# Patient Record
Sex: FEMALE | Race: WHITE | NOT HISPANIC OR LATINO | Employment: UNEMPLOYED | URBAN - METROPOLITAN AREA
[De-identification: names, ages, dates, MRNs, and addresses within clinical notes are randomized per-mention and may not be internally consistent; named-entity substitution may affect disease eponyms.]

---

## 2018-08-10 DIAGNOSIS — R92.8 ABNORMAL MAMMOGRAM: Primary | ICD-10-CM

## 2018-08-31 PROBLEM — D24.9 PAPILLOMA OF BREAST: Status: ACTIVE | Noted: 2018-08-31

## 2018-10-05 PROCEDURE — 88360 TUMOR IMMUNOHISTOCHEM/MANUAL: CPT | Performed by: PATHOLOGY

## 2018-10-05 PROCEDURE — 88360 TUMOR IMMUNOHISTOCHEM/MANUAL: CPT | Mod: 59 | Performed by: PATHOLOGY

## 2018-10-05 PROCEDURE — 88323 CONSLTJ&REPRT MATRL PREP SLD: CPT | Performed by: PATHOLOGY

## 2018-10-08 PROBLEM — D05.10 DCIS (DUCTAL CARCINOMA IN SITU): Status: ACTIVE | Noted: 2018-10-08

## 2018-10-12 ENCOUNTER — TELEPHONE (OUTPATIENT)
Dept: SURGERY | Facility: CLINIC | Age: 48
End: 2018-10-12

## 2018-10-12 NOTE — TELEPHONE ENCOUNTER
Spoke with pt. Offered appt with Dr Matias at [facility] for f/u. Pt states she has appt for f/u already on 10/19/18 at [facility] and would like to keep that appt as it would be sooner than appt on [facility]. Pt states going to [facility] is not out of her way for appts., just wanted to see about having the surgery closer to home. Pt will keep appt 10/19/18 at [facility] at 9:15 am.

## 2018-10-12 NOTE — TELEPHONE ENCOUNTER
----- Message from Prabha Matias MD sent at 10/11/2018  1:46 PM CDT -----  Regarding: RE: pt wanting to possible schedule sx @    Yes, she lives in [place], so I can probably even see her at my [facility] clinic.  Gil, would you mind getting her scheduled for a clinic visit?    ----- Message -----  From: Tami Mardaiaga LPN  Sent: 10/9/2018   2:08 PM  To: Prabha Matias MD, Gil Sanchez, RN  Subject: pt wanting to possible schedule sx @           Hey,    Pt seen yesterday by MD Solorzano:    Assessment/Plan:       Lou Jc is a 48 y.o. 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        - Currently awaiting tumor marker evaluation by pathology.   - Discussed current treatment options with the patient in detail including re-excision of the lumpectomy bed with postoperative XRT versus completion mastectomy which would not likely require postoperative radiation. Patient thinks she would like to proceed with re-excision, but would like to think about these options further and wait for final path to result prior to making a final decision.   - Patient will return to clinic next Friday for treatment planning.      Lino Solorzano MD  General Surgery PGY-3  Pager: [phone]         Spoke with pt, thinking she would rather have a mastectomy instead of re-excision. Also pt is wondering if she can possible been seen @ [facility] traveling over an hour to arrive @ [facility]. Can her FU appt and sx be scheduled there? I am leaving her appt on for 10/19/2018 here @ [facility] until I hear back from you.     Thanks,  K

## 2018-10-19 ENCOUNTER — OFFICE VISIT (OUTPATIENT)
Dept: SURGERY | Facility: CLINIC | Age: 48
End: 2018-10-19
Payer: MEDICAID

## 2018-10-19 ENCOUNTER — DOCUMENTATION ONLY (OUTPATIENT)
Dept: SURGERY | Facility: CLINIC | Age: 48
End: 2018-10-19

## 2018-10-19 VITALS
HEIGHT: 59 IN | WEIGHT: 132 LBS | BODY MASS INDEX: 26.61 KG/M2 | HEART RATE: 80 BPM | SYSTOLIC BLOOD PRESSURE: 133 MMHG | TEMPERATURE: 98 F | DIASTOLIC BLOOD PRESSURE: 78 MMHG

## 2018-10-19 DIAGNOSIS — D05.12 DUCTAL CARCINOMA IN SITU (DCIS) OF LEFT BREAST: Primary | ICD-10-CM

## 2018-10-19 PROCEDURE — 99024 POSTOP FOLLOW-UP VISIT: CPT | Mod: ,,, | Performed by: SURGERY

## 2018-10-19 PROCEDURE — 99213 OFFICE O/P EST LOW 20 MIN: CPT | Mod: PBBFAC,PO | Performed by: SURGERY

## 2018-10-19 PROCEDURE — 99999 PR PBB SHADOW E&M-EST. PATIENT-LVL III: CPT | Mod: PBBFAC,,, | Performed by: SURGERY

## 2018-10-19 NOTE — PROGRESS NOTES
Breast Surgery  [facility]  Department of Surgery      REFERRING PROVIDER: No referring provider defined for this encounter.    Chief Complaint: Post-op Evaluation (Post-Op Discuss Re-exision or Mastectomy.)      Subjective:      Patient ID: Lou Jc is a 48 y.o. female who presents with newly diagnosed LEFT breast cancer. She was initially seen at OSH ([facility] in [place], [place]). She had a BI-RADS 0 bilateral screening mammogram for architectural distortion and subsequent bilateral diagnostic mammogram and left breast ultrasound that were BI-RADS 4. She subsequently underwent ultrasound-guided core needle biopsy on 8/17/18 with pathology showing fragmented papilloma with no evidence of atypia or in-situ or invasive carcinoma. She was then seen by General Surgery at OSH on 8/31/18 to discuss wire localization excisional biopsy of this intraductal papilloma, which ultimately occurred on 9/21/18. 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 She was seen back in clinic at OSH to discuss surgical options on 10/8/18.     Patient does routinely do self breast exams. Patient has not noted a change on breast exam. Patient denies nipple discharge. Patient denies previous breast biopsy (prior to current events). Patient denies a personal history of breast cancer (prior to current events).    Findings at that time were the following:   Tumor size: 2.0 cm (focally positive margin - medial)  Tumor rdgrdrrdarddrderd:rd rd3rd Estrogen Receptor: +   Progesterone Receptor: +   Her-2 ryan: N/A   Lymph node status: Clinically negative  Lymphatic invasion: N/A     Risk Factors/Breast History  Age of Menarche: 10  Age of Menopause: 48  History of Hormonal Therapy: OCPs x 4 years. No HRT.  Number of Pregnancies: Nulliparous  Age of First Birth: N/A  Lactational History: N/A  Mammogram History: Annual or every  2 years for > 10 years  History of Chest Radiation: No  History of Breast Bx: Yes (current events)  History of Breast Ca: Yes (current events)  Family History of Breast Ca: Maternal aunt (age 60s). Maternal aunt (age 70s). Paternal aunt (age 70s).  Family History of Ovarian Ca: No  Other Family History: Father with pancreatic cancer.    Current smoker. 1 PPD x 25 years.  No antiplatelet or anticoagulant agents.    Past Medical History:   Diagnosis Date    Allergy     Breast cyst     DCIS (ductal carcinoma in situ)     Left    Papilloma of breast     Reflux      Past Surgical History:   Procedure Laterality Date    BREAST BIOPSY Left     papilloma    BREAST CYST ASPIRATION Left     diagnostic lap      LUMPECTOMY,BREAST Left 9/21/2018    Performed by [facility]abha [facility]ias [facility] at [facility] OR    TONSILLECTOMY       Current Outpatient Medications on File Prior to Visit   Medication Sig Dispense Refill    ascorbic acid, vitamin C, (VITAMIN C) 100 MG tablet Take 100 mg by mouth once daily.      b complex vitamins tablet Take 1 tablet by mouth once daily.      clonazePAM (KLONOPIN) 1 MG tablet Take 1 tablet (1 mg total) by mouth every evening. 30 tablet 0    ketoconazole (NIZORAL) 2 % cream Apply topically once daily. 30 g 0    ketoconazole (NIZORAL) 200 mg Tab Take 200 mg by mouth once daily.      loratadine (CLARITIN) 10 mg tablet Take 1 tablet (10 mg total) by mouth once daily. 30 tablet 3    naproxen (NAPROSYN) 500 MG tablet Take 1 tablet (500 mg total) by mouth 2 (two) times daily with meals. 90 tablet 1    omeprazole (PRILOSEC) 20 MG capsule Take 20 mg by mouth once daily.      diclofenac sodium (VOLTAREN) 1 % Gel Apply 2 g topically 4 (four) times daily. 100 g 0    ergocalciferol (ERGOCALCIFEROL) 50,000 unit Cap Take 1 capsule (50,000 Units total) by mouth every 7 days. 12 capsule 3    [DISCONTINUED] HYDROcodone-acetaminophen (NORCO) 5-325 mg per tablet Take 1 tablet by mouth every 6 (six) hours as needed  for Pain. 15 tablet 0    [DISCONTINUED] mupirocin (BACTROBAN) 2 % ointment Apply topically 2 (two) times daily. AAA 20 g 1    [DISCONTINUED] oxyCODONE-acetaminophen (PERCOCET)  mg per tablet Take 1 tablet by mouth every 6 (six) hours as needed for Pain.       No current facility-administered medications on file prior to visit.      Social History     Socioeconomic History    Marital status:      Spouse name: Not on file    Number of children: Not on file    Years of education: unknown    Highest education level: Not on file   Social Needs    Financial resource strain: Not on file    Food insecurity - worry: Not on file    Food insecurity - inability: Not on file    Transportation needs - medical: Not on file    Transportation needs - non-medical: Not on file   Occupational History    Not on file   Tobacco Use    Smoking status: Former Smoker     Packs/day: 0.50     Years: 20.00     Pack years: 10.00     Types: Cigarettes     Last attempt to quit: 2017     Years since quittin.1    Smokeless tobacco: Never Used   Substance and Sexual Activity    Alcohol use: Yes     Alcohol/week: 1.2 oz     Types: 2 Cans of beer per week    Drug use: Yes     Types: Marijuana    Sexual activity: Yes     Partners: Male     Birth control/protection: None   Other Topics Concern    Are you pregnant or think you may be? Not Asked    Breast-feeding Not Asked   Social History Narrative    Not on file     Family History   Problem Relation Age of Onset    Diabetes Mother     Heart disease Mother     COPD Mother     Hypertension Mother     Kidney disease Mother     Cancer Mother     Throat cancer Mother     Depression Mother     Hearing loss Mother     Heart disease Father     Cancer Father     Diabetes Father     Bone cancer Father     Breast cancer Maternal Aunt     Breast cancer Paternal Aunt         Review of Systems   Constitutional: Negative for activity change, chills, fatigue and  "fever.   HENT: Negative for congestion, rhinorrhea and sore throat.    Eyes: Negative for visual disturbance.   Respiratory: Negative for cough, shortness of breath and wheezing.    Cardiovascular: Negative for chest pain, palpitations and leg swelling.   Gastrointestinal: Negative for abdominal distention, abdominal pain, constipation, diarrhea, nausea and vomiting.   Genitourinary: Negative for dysuria, frequency and hematuria.   Musculoskeletal: Negative for arthralgias and myalgias.   Skin: Negative for color change, rash and wound.   Neurological: Negative for syncope, weakness and numbness.   Hematological: Does not bruise/bleed easily.   Psychiatric/Behavioral: Negative for behavioral problems and confusion.        Objective:   /78 (BP Location: Right arm, Patient Position: Sitting, BP Method: Medium (Automatic))   Pulse 80   Temp 98.1 °F (36.7 °C) (Oral)   Ht 4' 11" (1.499 m)   Wt 59.9 kg (132 lb)   LMP 06/27/2016   BMI 26.66 kg/m²     Physical Exam   Vitals reviewed.  Constitutional: She is oriented to person, place, and time. She appears well-developed and well-nourished.   Tearful throughout visit today   HENT:   Head: Normocephalic and atraumatic.   Eyes: Conjunctivae and EOM are normal.   Cardiovascular: Normal rate, regular rhythm and intact distal pulses.    Pulmonary/Chest: Effort normal. No respiratory distress. She has no wheezes. Right breast exhibits no inverted nipple, no mass, no nipple discharge, no skin change and no tenderness. Left breast exhibits no inverted nipple, no mass, no nipple discharge, no skin change and no tenderness. Breasts are symmetrical. There is no breast swelling.       Abdominal: Soft. She exhibits no distension. There is no tenderness.   Genitourinary: No breast bleeding.   Musculoskeletal: Normal range of motion. She exhibits no edema or deformity.   Neurological: She is alert and oriented to person, place, and time.   Skin: Skin is warm and dry. No rash " noted. She is not diaphoretic. No erythema.     Psychiatric: She has a normal mood and affect. Her behavior is normal.       Radiology review: Images personally reviewed by me in the clinic.   Bilateral Diagnostic Mammogram (8/10/18): BI-RADS 4. The breasts are extremely dense, which lowers the sensitivity of mammography. There is a focal asymmetry seen in the outer central region of the left breast. May correspond to a complicated cyst noted by ultrasound assessment. There are coarse heterogeneous calcifications seen in the left breast. Compared to the previous study, there are no significant changes. There is an asymmetry seen in the central region of the left breast in the middle depth on the MLO view. Does not persist on spot compression view. There is architectural distortion seen in the central region of the right breast in the posterior depth on the MLO view. Does not persist upon additional imaging. There are multiple similar lymph nodes seen in both axillae. Compared to the previous study, there are no significant changes.    US Breast Left Limited (8/10/18): BI-RADS 4.There is a 9 x 8 x 3 mm oval complicated cyst with circumscribed margins seen in the left breast at 3 o'clock. Contains non-dependent debris or tissue. There are multiple similar oval simple cysts with circumscribed margins seen in the left breast at 3 o'clock.     Bilateral Screening Mammogram (7/18/18): BI-RADS 0. The breasts are extremely dense, which lowers the sensitivity of mammography. There is an asymmetry seen in the central region of the left breast in the posterior depth on the MLO view. This is a new finding. There is a focal asymmetry seen in the outer central region of the left breast in the middle depth. This is a new finding. There are coarse heterogeneous calcifications seen in the left breast. Compared to the previous study, there are no significant changes. There is architectural distortion seen in the central region of the  right breast in the posterior depth on the MLO view. This is a new finding. There are multiple similar lymph nodes seen in both axillae. Compared to the previous study, there are no significant changes.     Assessment:       1. Ductal carcinoma in situ (DCIS) of left breast        Plan:     Options for management were discussed with the patient and her family. We reviewed the existing data noting the equivalency of breast conserving surgery with radiation therapy and mastectomy. We also reviewed the guidelines of the National Comprehensive Cancer Network for Stage 0 breast carcinoma. We discussed the need for lumpectomy margins to be negative for carcinoma, the necessity for postoperative radiation therapy after breast conservation in most cases, the possibility of a failed or false negative sentinel lymph node biopsy and the potential need for complete lymphadenectomy for a failed or positive sentinel lymph node biopsy were fully discussed. In the setting of mastectomy, delayed or immediate reconstruction options are available and were discussed.     In the setting of lumpectomy, radiation therapy would be recommended majority of the time. The duration and treatment side effects were discussed with the patient. This will coordinated with the radiation oncologist pending final pathology.    We also discussed the role of systemic therapy in the treatment of early stage breast cancer. We discussed that this is based on tumor biology and long status and will be determined based on final pathology. We discussed that if the cancer is hormone positive, endocrine therapy would be recommended in most cases and its use can reduce the risk of recurrence as well as improve survival. Side effects of treatment were briefly discussed. We also discussed the potential role for chemotherapy based on a number of factors such as tumor phenotype (ER+ vs. triple negative vs. [id number]+) and this would be determined in coordination with  the medical oncologist.    The patient, in consultation with her family, has elected to consider her options further.   She is on the fence sbout re-excision lumpectomy vs mastectomy with reconstruction.  She is very nervous regardless. The operative risks of bleeding, infection, recurrence, scarring, and anesthetic complications and the possibility of requiring further surgery were all noted.  I offered her surgery to be at [facility] or [facility], or even [facility] if she prefers.  We will refer her for Genetics as well given her above history.    Surgery scheduled. Follow-up in clinic roughly 14 days after surgery.     Patient was educated on breast cancer, receptors, wire localization lumpectomy, mastectomy, sentinel lymph node mapping and biopsy, axillary lymph node dissection, reconstruction, breast prosthesis with post-mastectomy bra and radiation therapy. Patient was given patient information binder including [facility] breast cancer treatment brochure. All her questions were answered.    Total time spent with the patient: 45 minutes. 30 minutes of face to face consultation and 15 minutes of chart review and coordination of care.

## 2018-10-23 ENCOUNTER — TELEPHONE (OUTPATIENT)
Dept: SURGERY | Facility: CLINIC | Age: 48
End: 2018-10-23

## 2018-10-23 NOTE — TELEPHONE ENCOUNTER
Contacted the patient regarding genetic counseling appointment.  Stated to the patient that there was a cancellation for tomorrow Wednesday 10/24/18 and she can be seen then if she would like.  The patient stated she would like to take the appointment, and she is scheduled to be seen tomorrow Wednesday 10/24/18 at 3:30 pm at [facility].  The patient voiced understanding of the appointment date, time, and location.

## 2018-10-24 ENCOUNTER — LAB VISIT (OUTPATIENT)
Dept: LAB | Facility: HOSPITAL | Age: 48
End: 2018-10-24
Payer: MEDICAID

## 2018-10-24 ENCOUNTER — OFFICE VISIT (OUTPATIENT)
Dept: SURGERY | Facility: CLINIC | Age: 48
End: 2018-10-24
Payer: MEDICAID

## 2018-10-24 DIAGNOSIS — D05.12 DUCTAL CARCINOMA IN SITU (DCIS) OF LEFT BREAST: Primary | ICD-10-CM

## 2018-10-24 DIAGNOSIS — Z80.0 FAMILY HISTORY OF PANCREATIC CANCER: ICD-10-CM

## 2018-10-24 DIAGNOSIS — Z80.3 FAMILY HISTORY OF BREAST CANCER: ICD-10-CM

## 2018-10-24 DIAGNOSIS — D05.12 DUCTAL CARCINOMA IN SITU (DCIS) OF LEFT BREAST: ICD-10-CM

## 2018-10-24 DIAGNOSIS — Z71.83 ENCOUNTER FOR NONPROCREATIVE GENETIC COUNSELING: Primary | ICD-10-CM

## 2018-10-24 PROCEDURE — 99215 OFFICE O/P EST HI 40 MIN: CPT | Mod: S$PBB,,, | Performed by: NURSE PRACTITIONER

## 2018-10-24 PROCEDURE — 36415 COLL VENOUS BLD VENIPUNCTURE: CPT

## 2018-10-24 NOTE — PROGRESS NOTES
Mrs Jc presents for genetic counseling, referred by Dr Matias. She is a 48 year old female with recent diagnosis of DCIS. See pedigree for full family history which will be scanned into Epic media.  This patient does not have a known hereditary cancer genetic mutation on either side of the family and does not have known Ashkenazi Amish ancestry.      We reviewed her medical and family history and discussed the genetics of breast cancer, cancer risks associated with a hereditary predisposition to cancer, and the benefits, risks, and limitations of genetic testing according to current NCCN guidelines.  Discussed sporadic verses family clustering verses hereditary cancer. The patients history raises the possibility of a genetic susceptibility to breast cancer, with the two genes most strongly associated with early-onset breast cancer are BRCA1 and BRCA2. Mutations in these genes increase the risk for both breast and ovarian cancer in women and breast and early prostate cancer in men, along with an elevated risk of pancreatic cancer and melanoma. Due to significant clinical overlap in hereditary cancer susceptibility genes, a molecular diagnosis of a hereditary cancer condition is necessary to clarify the cancer risks for this patient and multigene testing was discussed.      Possible results of genetic testing: positive result would mean that a mutation known to be associated with a higher risk of cancer was identified; negative result would mean that no mutation known to increase the risk of cancer was identified; variant of uncertain significance (VUS) in which a genetic change was identified in a gene, but it is unclear if this change causes an increase in cancer risk normally associated with mutations in the gene. There is an estimated 1-2% chance of a reported variant of uncertain significance in BRCA1 and BRCA2 and up to a 30% with newer genes included in multigene panels. Due to the clinical  uncertainty of this type of change, VUSs are not used to make medical management decisions for a patient. Finally, I advised the patient that her medical management may change based on these results and may include increased surveillance, use of chemoprevention, or prophylactic surgery. A tailored cancer prevention plan and implications of the patients test results for relatives will be reviewed once results are available.      Women who carry mutations in the BRCA1 or BRCA2 gene have a 41-87% risk to develop breast cancer and up to a 54 % risk of developing ovarian cancer during their lifetime. Women who carry a BRCA1 or BRCA2 mutation also have a greater chance of developing contralateral breast cancer. Men who carry a BRCA2 gene mutation have up to a 6-7% risk to develop breast cancer and an increased risk for early-onset aggressive prostate cancer (2-6 fold increase). Mutations in the BRCA2 gene have also been associated with an increased risk other types of cancer in both men and women in some families, most notably pancreatic cancer and melanoma. Specific cancer risks vary depending on the tumor suppressor gene in which a mutation arises.      Discussed the cost of testing, various labs which can conduct genetic testing and potential insurance coverage. She desires to proceed with testing, expressed her understanding of the information discussed today and provided informed consent for testing.         RECOMMENDATIONS:                                                                1. Integrated [facility] with [facility] through [facility], will request for results to be expedited for surgical decision making.   2. Post test counseling will be provided once results are available with healthcare management per results, including testing of any additional family members if pathogenic mutation is identified.    Time in counseling today 45 min, total time 45 min (entire time spent in face to face  counseling)

## 2018-10-26 NOTE — PROGRESS NOTES
Nurse Navigator Note:     Met with patient during her consult with Dr. Matias. Patient and I reviewed the information she discussed with Dr. Matias, including treatment options, diagnosis, and future plans for workup. Patient and I went through the new patient booklet, explained some of the information and why it is provided.     Also offered patient consults with our other specialty clinics: Dr. Davis for gynecological health during treatment, [facility] for physical therapy evaluation, Dr. Muller for psychological support, and [facility] for nutritional counseling. Explained to patient that all of these support services are completely optional. Discussed that physical therapy is the only service that is recommended pre-op specifically, everything else can be requested at a later time. Patient was given a copy of Dr. Matias's card and my card. Encouraged her to call me if she has any questions or concerns or would like to schedule any additional appointments. Verbalized understanding of all information.

## 2018-10-30 ENCOUNTER — TELEPHONE (OUTPATIENT)
Dept: SURGERY | Facility: CLINIC | Age: 48
End: 2018-10-30

## 2018-10-30 NOTE — TELEPHONE ENCOUNTER
----- Message from Jean-Claude Mcgee sent at 10/30/2018  4:15 PM CDT -----    Communication Preference: [phone]    Additional Information:Pt states she need to speak with nurse in regards to Her2 and IDC.

## 2018-11-07 ENCOUNTER — DOCUMENTATION ONLY (OUTPATIENT)
Dept: SURGERY | Facility: CLINIC | Age: 48
End: 2018-11-07

## 2018-11-07 NOTE — PROGRESS NOTES
Results received from [email or web address], negative Integrated BRACAnalysis with Mahendra, patient was phoned and results discussed. She will f/u with Dr Matias regarding her remaining treatment/surgery

## 2018-11-08 ENCOUNTER — TELEPHONE (OUTPATIENT)
Dept: SURGERY | Facility: CLINIC | Age: 48
End: 2018-11-08

## 2018-11-08 LAB — INTEGRATED BRAC ANALYSIS: NORMAL

## 2018-11-08 NOTE — TELEPHONE ENCOUNTER
Call to pt to schedule f/u appt for final surgical planning. Pt would like to try lumpectomy and will come in for an appt to Dr Matias 11/12/18 at [facility].

## 2018-11-12 ENCOUNTER — OFFICE VISIT (OUTPATIENT)
Dept: SURGERY | Facility: CLINIC | Age: 48
End: 2018-11-12
Payer: MEDICAID

## 2018-11-12 ENCOUNTER — ANESTHESIA EVENT (OUTPATIENT)
Dept: SURGERY | Facility: HOSPITAL | Age: 48
End: 2018-11-12
Payer: MEDICAID

## 2018-11-12 DIAGNOSIS — D05.12 DUCTAL CARCINOMA IN SITU (DCIS) OF LEFT BREAST: Primary | ICD-10-CM

## 2018-11-12 PROCEDURE — 99212 PR OFFICE/OUTPT VISIT, EST, LEVL II, 10-19 MIN: ICD-10-PCS | Mod: S$PBB,,, | Performed by: SURGERY

## 2018-11-12 PROCEDURE — 99999 PR PBB SHADOW E&M-EST. PATIENT-LVL I: ICD-10-PCS | Mod: PBBFAC,,, | Performed by: SURGERY

## 2018-11-12 PROCEDURE — 99212 OFFICE O/P EST SF 10 MIN: CPT | Mod: S$PBB,,, | Performed by: SURGERY

## 2018-11-12 PROCEDURE — 99999 PR PBB SHADOW E&M-EST. PATIENT-LVL I: CPT | Mod: PBBFAC,,, | Performed by: SURGERY

## 2018-11-12 PROCEDURE — 99211 OFF/OP EST MAY X REQ PHY/QHP: CPT | Mod: PBBFAC,PO | Performed by: SURGERY

## 2018-11-15 ENCOUNTER — TELEPHONE (OUTPATIENT)
Dept: SURGERY | Facility: CLINIC | Age: 48
End: 2018-11-15

## 2018-11-15 NOTE — TELEPHONE ENCOUNTER
Call to pt to inform of surgery arrival time tomorrow 11/16/18. Call went to voice mail. Message left for pt to call back.

## 2018-11-16 ENCOUNTER — HOSPITAL ENCOUNTER (OUTPATIENT)
Facility: HOSPITAL | Age: 48
Discharge: HOME | End: 2018-11-16
Attending: SURGERY | Admitting: SURGERY
Payer: MEDICAID

## 2018-11-16 ENCOUNTER — ANESTHESIA (OUTPATIENT)
Dept: SURGERY | Facility: HOSPITAL | Age: 48
End: 2018-11-16
Payer: MEDICAID

## 2018-11-16 VITALS
WEIGHT: 134 LBS | RESPIRATION RATE: 20 BRPM | DIASTOLIC BLOOD PRESSURE: 84 MMHG | HEART RATE: 83 BPM | HEIGHT: 59 IN | TEMPERATURE: 99 F | OXYGEN SATURATION: 100 % | BODY MASS INDEX: 27.01 KG/M2 | SYSTOLIC BLOOD PRESSURE: 120 MMHG

## 2018-11-16 DIAGNOSIS — D05.10 DCIS (DUCTAL CARCINOMA IN SITU) OF BREAST: ICD-10-CM

## 2018-11-16 PROCEDURE — 63600175 PHARM REV CODE 636 W HCPCS

## 2018-11-16 PROCEDURE — 71000033 HC RECOVERY, INTIAL HOUR: Performed by: SURGERY

## 2018-11-16 PROCEDURE — 19301 PARTIAL MASTECTOMY: CPT | Mod: LT,,, | Performed by: SURGERY

## 2018-11-16 PROCEDURE — 71000015 HC POSTOP RECOV 1ST HR: Performed by: SURGERY

## 2018-11-16 PROCEDURE — 00400 ANES INTEGUMENTARY SYS NOS: CPT | Performed by: SURGERY

## 2018-11-16 PROCEDURE — 88360 TUMOR IMMUNOHISTOCHEM/MANUAL: CPT | Performed by: PATHOLOGY

## 2018-11-16 PROCEDURE — 63600175 PHARM REV CODE 636 W HCPCS: Performed by: ANESTHESIOLOGY

## 2018-11-16 PROCEDURE — 25000003 PHARM REV CODE 250: Performed by: STUDENT IN AN ORGANIZED HEALTH CARE EDUCATION/TRAINING PROGRAM

## 2018-11-16 PROCEDURE — 36000707: Performed by: SURGERY

## 2018-11-16 PROCEDURE — 63600175 PHARM REV CODE 636 W HCPCS: Performed by: NURSE ANESTHETIST, CERTIFIED REGISTERED

## 2018-11-16 PROCEDURE — 25000003 PHARM REV CODE 250: Performed by: SURGERY

## 2018-11-16 PROCEDURE — 94761 N-INVAS EAR/PLS OXIMETRY MLT: CPT

## 2018-11-16 PROCEDURE — 37000008 HC ANESTHESIA 1ST 15 MINUTES: Performed by: SURGERY

## 2018-11-16 PROCEDURE — 71000016 HC POSTOP RECOV ADDL HR: Performed by: SURGERY

## 2018-11-16 PROCEDURE — 25000003 PHARM REV CODE 250: Performed by: NURSE ANESTHETIST, CERTIFIED REGISTERED

## 2018-11-16 PROCEDURE — 64520 N BLOCK LUMBAR/THORACIC: CPT | Performed by: ANESTHESIOLOGY

## 2018-11-16 PROCEDURE — 88342 IMHCHEM/IMCYTCHM 1ST ANTB: CPT | Performed by: PATHOLOGY

## 2018-11-16 PROCEDURE — 37000009 HC ANESTHESIA EA ADD 15 MINS: Performed by: SURGERY

## 2018-11-16 PROCEDURE — 64461 PVB THORACIC SINGLE INJ SITE: CPT | Mod: 59,LT,, | Performed by: ANESTHESIOLOGY

## 2018-11-16 PROCEDURE — D9220A PRA ANESTHESIA: Mod: ANES,,, | Performed by: ANESTHESIOLOGY

## 2018-11-16 PROCEDURE — D9220A PRA ANESTHESIA: Mod: CRNA,,, | Performed by: NURSE ANESTHETIST, CERTIFIED REGISTERED

## 2018-11-16 PROCEDURE — 88307 TISSUE EXAM BY PATHOLOGIST: CPT | Performed by: PATHOLOGY

## 2018-11-16 PROCEDURE — 36000706: Performed by: SURGERY

## 2018-11-16 RX ORDER — DEXAMETHASONE SODIUM PHOSPHATE 4 MG/ML
INJECTION, SOLUTION INTRA-ARTICULAR; INTRALESIONAL; INTRAMUSCULAR; INTRAVENOUS; SOFT TISSUE
Status: DISCONTINUED | OUTPATIENT
Start: 2018-11-16 | End: 2018-11-16

## 2018-11-16 RX ORDER — ONDANSETRON 2 MG/ML
4 INJECTION INTRAMUSCULAR; INTRAVENOUS ONCE AS NEEDED
Status: DISCONTINUED | OUTPATIENT
Start: 2018-11-16 | End: 2018-11-16 | Stop reason: HOSPADM

## 2018-11-16 RX ORDER — ACETAMINOPHEN 10 MG/ML
INJECTION, SOLUTION INTRAVENOUS
Status: DISCONTINUED | OUTPATIENT
Start: 2018-11-16 | End: 2018-11-16

## 2018-11-16 RX ORDER — ONDANSETRON 2 MG/ML
INJECTION INTRAMUSCULAR; INTRAVENOUS
Status: DISCONTINUED | OUTPATIENT
Start: 2018-11-16 | End: 2018-11-16

## 2018-11-16 RX ORDER — FENTANYL CITRATE 50 UG/ML
25 INJECTION, SOLUTION INTRAMUSCULAR; INTRAVENOUS EVERY 5 MIN PRN
Status: DISCONTINUED | OUTPATIENT
Start: 2018-11-16 | End: 2018-11-16 | Stop reason: HOSPADM

## 2018-11-16 RX ORDER — HYDROCODONE BITARTRATE AND ACETAMINOPHEN 5; 325 MG/1; MG/1
1 TABLET ORAL EVERY 4 HOURS PRN
Status: DISCONTINUED | OUTPATIENT
Start: 2018-11-16 | End: 2018-11-16 | Stop reason: HOSPADM

## 2018-11-16 RX ORDER — LIDOCAINE HYDROCHLORIDE 10 MG/ML
INJECTION, SOLUTION EPIDURAL; INFILTRATION; INTRACAUDAL; PERINEURAL
Status: DISCONTINUED
Start: 2018-11-16 | End: 2018-11-16 | Stop reason: HOSPADM

## 2018-11-16 RX ORDER — FENTANYL CITRATE 50 UG/ML
25 INJECTION, SOLUTION INTRAMUSCULAR; INTRAVENOUS EVERY 5 MIN PRN
Status: COMPLETED | OUTPATIENT
Start: 2018-11-16 | End: 2018-11-16

## 2018-11-16 RX ORDER — SODIUM CHLORIDE 9 MG/ML
INJECTION, SOLUTION INTRAVENOUS CONTINUOUS
Status: DISCONTINUED | OUTPATIENT
Start: 2018-11-16 | End: 2018-11-16 | Stop reason: HOSPADM

## 2018-11-16 RX ORDER — FENTANYL CITRATE 50 UG/ML
INJECTION, SOLUTION INTRAMUSCULAR; INTRAVENOUS
Status: DISCONTINUED | OUTPATIENT
Start: 2018-11-16 | End: 2018-11-16

## 2018-11-16 RX ORDER — BUPIVACAINE HYDROCHLORIDE AND EPINEPHRINE 5; 5 MG/ML; UG/ML
INJECTION, SOLUTION EPIDURAL; INTRACAUDAL; PERINEURAL
Status: COMPLETED | OUTPATIENT
Start: 2018-11-16 | End: 2018-11-16

## 2018-11-16 RX ORDER — PROPOFOL 10 MG/ML
VIAL (ML) INTRAVENOUS
Status: DISCONTINUED | OUTPATIENT
Start: 2018-11-16 | End: 2018-11-16

## 2018-11-16 RX ORDER — SODIUM CHLORIDE 9 MG/ML
INJECTION, SOLUTION INTRAVENOUS CONTINUOUS PRN
Status: DISCONTINUED | OUTPATIENT
Start: 2018-11-16 | End: 2018-11-16

## 2018-11-16 RX ORDER — MIDAZOLAM HYDROCHLORIDE 1 MG/ML
INJECTION INTRAMUSCULAR; INTRAVENOUS
Status: COMPLETED
Start: 2018-11-16 | End: 2018-11-16

## 2018-11-16 RX ORDER — HYDROCODONE BITARTRATE AND ACETAMINOPHEN 5; 325 MG/1; MG/1
1 TABLET ORAL EVERY 4 HOURS PRN
Qty: 15 TABLET | Refills: 0 | Status: SHIPPED | OUTPATIENT
Start: 2018-11-16 | End: 2018-11-29 | Stop reason: SDUPTHER

## 2018-11-16 RX ORDER — SODIUM CHLORIDE 0.9 % (FLUSH) 0.9 %
3 SYRINGE (ML) INJECTION
Status: DISCONTINUED | OUTPATIENT
Start: 2018-11-16 | End: 2018-11-16 | Stop reason: HOSPADM

## 2018-11-16 RX ORDER — LIDOCAINE HCL/PF 100 MG/5ML
SYRINGE (ML) INTRAVENOUS
Status: DISCONTINUED | OUTPATIENT
Start: 2018-11-16 | End: 2018-11-16

## 2018-11-16 RX ORDER — FENTANYL CITRATE 50 UG/ML
INJECTION, SOLUTION INTRAMUSCULAR; INTRAVENOUS
Status: COMPLETED
Start: 2018-11-16 | End: 2018-11-16

## 2018-11-16 RX ORDER — MIDAZOLAM HYDROCHLORIDE 1 MG/ML
0.5 INJECTION INTRAMUSCULAR; INTRAVENOUS
Status: DISCONTINUED | OUTPATIENT
Start: 2018-11-16 | End: 2018-11-16 | Stop reason: HOSPADM

## 2018-11-16 RX ADMIN — FENTANYL CITRATE 100 MCG: 50 INJECTION INTRAMUSCULAR; INTRAVENOUS at 06:11

## 2018-11-16 RX ADMIN — BUPIVACAINE HYDROCHLORIDE AND EPINEPHRINE BITARTRATE 15 ML: 5; .0091 INJECTION, SOLUTION EPIDURAL; INTRACAUDAL; PERINEURAL at 06:11

## 2018-11-16 RX ADMIN — MIDAZOLAM HYDROCHLORIDE 2 MG: 1 INJECTION, SOLUTION INTRAMUSCULAR; INTRAVENOUS at 06:11

## 2018-11-16 RX ADMIN — FENTANYL CITRATE 25 MCG: 50 INJECTION INTRAMUSCULAR; INTRAVENOUS at 08:11

## 2018-11-16 RX ADMIN — ACETAMINOPHEN 1000 MG: 10 INJECTION, SOLUTION INTRAVENOUS at 07:11

## 2018-11-16 RX ADMIN — ONDANSETRON 4 MG: 2 INJECTION INTRAMUSCULAR; INTRAVENOUS at 07:11

## 2018-11-16 RX ADMIN — FENTANYL CITRATE 25 MCG: 50 INJECTION, SOLUTION INTRAMUSCULAR; INTRAVENOUS at 07:11

## 2018-11-16 RX ADMIN — DEXAMETHASONE SODIUM PHOSPHATE 4 MG: 4 INJECTION, SOLUTION INTRAMUSCULAR; INTRAVENOUS at 07:11

## 2018-11-16 RX ADMIN — FENTANYL CITRATE 100 MCG: 50 INJECTION, SOLUTION INTRAMUSCULAR; INTRAVENOUS at 06:11

## 2018-11-16 RX ADMIN — SODIUM CHLORIDE: 0.9 INJECTION, SOLUTION INTRAVENOUS at 07:11

## 2018-11-16 RX ADMIN — LIDOCAINE HYDROCHLORIDE 50 MG: 20 INJECTION, SOLUTION INTRAVENOUS at 07:11

## 2018-11-16 RX ADMIN — PROPOFOL 150 MG: 10 INJECTION, EMULSION INTRAVENOUS at 07:11

## 2018-11-16 RX ADMIN — HYDROCODONE BITARTRATE AND ACETAMINOPHEN 1 TABLET: 5; 325 TABLET ORAL at 08:11

## 2018-11-16 RX ADMIN — MIDAZOLAM HYDROCHLORIDE 2 MG: 1 INJECTION INTRAMUSCULAR; INTRAVENOUS at 06:11

## 2018-11-16 RX ADMIN — FENTANYL CITRATE 50 MCG: 50 INJECTION, SOLUTION INTRAMUSCULAR; INTRAVENOUS at 08:11

## 2018-11-16 NOTE — ANESTHESIA POSTPROCEDURE EVALUATION
"Anesthesia Post Evaluation    Patient: Lou Jc    Procedure(s) Performed: Procedure(s) (LRB):  LUMPECTOMY, BREAST-re-ecxision (Left)    Final Anesthesia Type: general  Patient location during evaluation: PACU  Patient participation: Yes- Able to Participate  Level of consciousness: awake and alert and oriented  Post-procedure vital signs: reviewed and stable  Pain management: adequate  Airway patency: patent  PONV status at discharge: No PONV  Anesthetic complications: no      Cardiovascular status: blood pressure returned to baseline and hemodynamically stable  Respiratory status: unassisted, spontaneous ventilation and room air  Hydration status: euvolemic  Follow-up not needed.        Visit Vitals  /84 (BP Location: Right arm, Patient Position: Lying)   Pulse 83   Temp 37.1 °C (98.7 °F)   Resp 20   Ht 4' 11" (1.499 m)   Wt 60.8 kg (134 lb)   LMP 06/27/2016   SpO2 100%   Breastfeeding? No   BMI 27.06 kg/m²       Pain/Anmol Score: Pain Assessment Performed: Yes (11/16/2018  9:29 AM)  Presence of Pain: complains of pain/discomfort (11/16/2018  9:57 AM)  Pain Rating Prior to Med Admin: 5 (11/16/2018  8:50 AM)  Anmol Score: 10 (11/16/2018  8:15 AM)        "

## 2018-11-16 NOTE — OP NOTE
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      She was then brought to the Operating Room and placed in the supine position. Anesthesia with regional and general was administered.  The left breast, anterior chest, right arm and axilla were then prepped and draped in a sterile fashion.     Next, we turned our attention to the left breast.   The prior incision was excised in the lower outer quadrant of the left breast . The prior cavity was entered and the seroma was evacuated. We then dissected out the medial margins requiring reexcision.   We did not dissect all the way down to,including the underlying pectoralis fascia. The specimen was then marked on the back table using short stitch superior, long on new medial cavity.  It was then  submitted for permanent pathology.     Within the lumpectomy cavity, hemostasis was achieved with cautery. The wound was irrigated until clear. There was no evidence of bleeding. It was closed in multiple layers with deep dermal and subcutaneous interrupted Vicryl sutures and a running 4-0 vicryl subcuticular skin closure.    Dermabond was applied. Sterile fluff gauze was placed and a post-procedure bra was placed. She tolerated the procedure well without complication and was turned over to Anesthesia for transport to the recovery area in a satisfactory condition. All specimens were sent to Pathology for permanent sectioning.    ESTIMATED BLOOD LOSS: 5ml    COMPLICATIONS: none    DISPOSITION:  PACU--hemodynamically stable    ATTESTATION:  I was present and scrubbed for the entire procedure.

## 2018-11-16 NOTE — BRIEF OP NOTE
[facility]  Brief Operative Note     SUMMARY     Surgery Date: 11/16/2018     Surgeon(s) and Role:     * Prabha Matias MD - Primary    Assisting Surgeon: None    Pre-op Diagnosis:  Ductal carcinoma in situ (DCIS) of left breast [D05.12]    Post-op Diagnosis:  Post-Op Diagnosis Codes:     * Ductal carcinoma in situ (DCIS) of left breast [D05.12]    Procedure(s) (LRB):  LUMPECTOMY, BREAST-re-ecxision (Left)    Anesthesia: General    Description of the findings of the procedure: Medial half of cavity was re-excised and anterior skin margin taken.    Findings/Key Components: No apparent palpable disease,  Fat necrosis noted on the cavity surface.    Estimated Blood Loss: minimal         Specimens:   Specimen (12h ago, onward)    Start     Ordered    11/16/18 0736  Specimen to Pathology - Surgery  Once     Comments:  Jar 1 - left breast  Re-excision of medial 1/2 of cavity. Short stitch - superior , long = marks new medial margin (perm)Jar 2 - new anterior margin- skin marks new margin (perm)     Start Status   11/16/18 0736 Needs to be Collected       11/16/18 0742          Discharge Note    SUMMARY     Admit Date: 11/16/2018    Discharge Date and Time:  11/16/2018 8:01 AM    Hospital Course (synopsis of major diagnoses, care, treatment, and services provided during the course of the hospital stay): To OR for re-excision of focal positive medial margin.  Tolerated well.  Discharged from recovery when criteria met     Final Diagnosis: Post-Op Diagnosis Codes:     * Ductal carcinoma in situ (DCIS) of left breast [D05.12]    Disposition: Home or Self Care    Follow Up/Patient Instructions:     Medications:  Reconciled Home Medications:      Medication List      ASK your doctor about these medications    b complex vitamins tablet  Take 1 tablet by mouth once daily.     clonazePAM 1 MG tablet  Commonly known as:  KLONOPIN  Take 1 tablet (1 mg total) by mouth every evening.     diclofenac sodium 1 %  Gel  Commonly known as:  VOLTAREN  Apply 2 g topically 4 (four) times daily.     ergocalciferol 50,000 unit Cap  Commonly known as:  ERGOCALCIFEROL  Take 1 capsule (50,000 Units total) by mouth every 7 days.     ketoconazole 2 % cream  Commonly known as:  NIZORAL  Apply topically once daily.     loratadine 10 mg tablet  Commonly known as:  CLARITIN  Take 1 tablet (10 mg total) by mouth once daily.     naproxen 500 MG tablet  Commonly known as:  NAPROSYN  Take 1 tablet (500 mg total) by mouth 2 (two) times daily with meals.     omeprazole 20 MG capsule  Commonly known as:  PRILOSEC  Take 1 capsule (20 mg total) by mouth once daily.     VITAMIN C 100 MG tablet  Generic drug:  ascorbic acid (vitamin C)  Take 100 mg by mouth once daily.        .  Return to clinic with Dr. Matias in 2 weeks.

## 2018-11-16 NOTE — ANESTHESIA PREPROCEDURE EVALUATION
11/16/2018  Lou Jc is a 48 y.o., female.    Anesthesia Evaluation    I have reviewed the Patient Summary Reports.    I have reviewed the Nursing Notes.   I have reviewed the Medications.     Review of Systems  Anesthesia Hx:  No problems with previous Anesthesia Denies Hx of Anesthetic complications  History of prior surgery of interest to airway management or planning: Previous anesthesia: General Denies Family Hx of Anesthesia complications.   Denies Personal Hx of Anesthesia complications.   Social:  Former Smoker    Hematology/Oncology:        Current/Recent Cancer. Breast left   EENT/Dental:EENT/Dental Normal   Cardiovascular:   Exercise tolerance: good  Denies Angina. NYHA Classification II    Pulmonary:   Denies Shortness of breath.    Hepatic/GI:   GERD, well controlled    Musculoskeletal:  Musculoskeletal Normal    Neurological:  Neurology Normal        Physical Exam  General:  Well nourished    Airway/Jaw/Neck:  Airway Findings: Mouth Opening: Normal Tongue: Normal  General Airway Assessment: Adult  Mallampati: II  TM Distance: Normal, at least 6 cm  Jaw/Neck Findings:  Neck ROM: Normal ROM      Dental:  Dental Findings:    Chest/Lungs:  Chest/Lungs Findings: Normal Respiratory Rate     Heart/Vascular:  Heart Findings: Rate: Normal        Mental Status:  Mental Status Findings:  Cooperative, Alert and Oriented         Anesthesia Plan  Type of Anesthesia, risks & benefits discussed:  Anesthesia Type:  general  Patient's Preference:   Intra-op Monitoring Plan: standard ASA monitors  Intra-op Monitoring Plan Comments:   Post Op Pain Control Plan: multimodal analgesia, peripheral nerve block, IV/PO Opioids PRN and per primary service following discharge from PACU  Post Op Pain Control Plan Comments:   Induction:   IV  Beta Blocker:  Patient is not currently on a Beta-Blocker (No further  documentation required).       Informed Consent: Patient understands risks and agrees with Anesthesia plan.  Questions answered. Anesthesia consent signed with patient.  ASA Score: 2     Day of Surgery Review of History & Physical:    H&P update referred to the surgeon.         Ready For Surgery From Anesthesia Perspective.

## 2018-11-16 NOTE — INTERVAL H&P NOTE
The patient has been examined and the H&P has been reviewed:    I concur with the findings and no changes have occurred since H&P was written. No fever, chills, SOB, chest pain or visits to the ED.    Anesthesia/Surgery risks, benefits and alternative options discussed and understood by patient/family.          There are no hospital problems to display for this patient.

## 2018-11-16 NOTE — TRANSFER OF CARE
"Anesthesia Transfer of Care Note    Patient: Lou Jc    Procedure(s) Performed: Procedure(s) (LRB):  LUMPECTOMY, BREAST-re-ecxision (Left)    Patient location: PACU    Anesthesia Type: general    Transport from OR: Transported from OR on room air with adequate spontaneous ventilation    Post pain: adequate analgesia    Post assessment: no apparent anesthetic complications and tolerated procedure well    Post vital signs: stable    Level of consciousness: awake, alert and oriented    Nausea/Vomiting: no nausea/vomiting    Complications: none    Transfer of care protocol was followed      Last vitals:   Visit Vitals  /79   Pulse 103   Temp 36 °C (96.8 °F) (Temporal)   Resp 18   Ht 4' 11" (1.499 m)   Wt 60.8 kg (134 lb)   LMP 06/27/2016   SpO2 100%   Breastfeeding? No   BMI 27.06 kg/m²     "

## 2018-11-16 NOTE — ANESTHESIA PROCEDURE NOTES
T4 paravertebral block    Patient location during procedure: pre-op   Block not for primary anesthetic.  Reason for block: at surgeon's request and post-op pain management   Post-op Pain Location: left breast  Start time: 11/16/2018 6:51 AM  Timeout: 11/16/2018 6:50 AM   End time: 11/16/2018 6:55 AM  Surgery related to: left lumpectomy  Staffing  Anesthesiologist: Mercedes Miller MD  Resident/CRNA: Moncho Ramos MD  Performed: resident/CRNA   Preanesthetic Checklist  Completed: patient identified, site marked, surgical consent, pre-op evaluation, timeout performed, IV checked, risks and benefits discussed and monitors and equipment checked  Peripheral Block  Patient position: sitting  Prep: ChloraPrep  Patient monitoring: heart rate, cardiac monitor, continuous pulse ox, continuous capnometry and frequent blood pressure checks  Block type: paravertebral - thoracic  Laterality: left  Injection technique: single shot  Needle  Needle type: Tuohy   Needle gauge: 17 G  Needle length: 3.5 in  Needle localization: anatomical landmarks     Assessment  Injection assessment: negative aspiration and negative parasthesia  Paresthesia pain: none  Heart rate change: no  Slow fractionated injection: yes  Additional Notes  T4 os at 3cm  VSS.  [facility] RN monitoring vitals throughout procedure.  Patient tolerated procedure well.

## 2018-11-16 NOTE — DISCHARGE INSTRUCTIONS
Discharge Instructions for Lumpectomy or Breast Biopsy  You just had a procedure to remove a lump or a small piece of tissue from your breast. After surgery, be sure to have an adult drive you home. Ask your healthcare provider when you will get the results of the biopsy. Will they call you or will you talk about it at your next visit?  Make a follow-up appointment as directed by your healthcare provider.  What to expect  The following are common after a lumpectomy or breast biopsy:   · Bruising and mild swelling around the incision  · Mild discomfort for a few days.  · Feeling tired for a day or so.  · Feeling anxious or down.   Diet  What to eat and drink after a lumpectomy or breast biopsy:   · Start with liquids and light, easy-to-digest foods, such as bananas and dry toast. As you feel up to it, slowly return to your normal diet.  · Drink at least 6 to 8 glasses of water or other nonalcoholic fluids a day, unless directed otherwise.  Activity  What you can do after a lumpectomy or breast biopsy:   · After the procedure, take it easy for the rest of the day.  · If you had general anesthesia, don't use machinery or power tools, drink alcohol, or make any major decisions for at least the first 24 hours.  · Ask your healthcare providers how you should care for the dressing and when you can take it off.  · You may shower and pat the incision (cut) dry, but don't soak in a tub until you see the healthcare provider again.   · Return to normal activities (including driving) in 24 hours unless otherwise instructed by your healthcare provider.   Bandage and incision care  Suggestions for care include:  · Take pain medicines as directed. Dont wait until the pain gets bad before taking them. Dont drink alcohol while on pain medicines.  · Wrap a waterproof ice pack or bag of frozen peas in a thin cloth. Place this over the bandaged incision for no longer than 20 minutes at a time. Do this as instructed by your  healthcare provider.  · Wear a comfortable, snug-fitting bra at all times, even to bed, to help keep swelling down.  · If your incision has been closed with glue, do not apply lotion, ointments, or creams to the area. Doing so can cause the glue to dissolve.  · If strips of tape have been used to close your incision, do not pull them off. Let them fall off on their own.  · If you have a gauze bandage, keep it and the wound dry for 48 hours. If the gauze bandage gets wet, replace it with a clean, dry bandage.    When to call your healthcare provider  Call your healthcare provider right away if you have any of the following:  · Vomiting or nausea that does not go away  · Fever over 100.4°F (38°C) or chills  · Foul-smelling discharge from the incision  · Pain not relieved by pain medicines  · Bleeding, warmth, redness, or hard swelling around the incision  · Chest pain or shortness of breath  Be sure you know how to get help anytime you have problems or questions, including after office hours, on weekends, and on holidays.   Date Last Reviewed: 10/31/2015  © 9203-1339 [employer]. [address] [place] [address]. All rights reserved. This information is not intended as a substitute for professional medical care. Always follow your healthcare professional's instructions.      Anesthesia: General Anesthesia     You are watched continuously during your procedure by your anesthesia provider.     Youre due to have surgery. During surgery, youll be given medicine called anesthesia or anesthetic. This will keep you comfortable and pain-free. Your anesthesia provider will use general anesthesia.  What is general anesthesia?  General anesthesia puts you into a state like deep sleep. It goes into the bloodstream (IV anesthetics), into the lungs (gas anesthetics), or both. You feel nothing during the procedure. You will not remember it. During the procedure, the anesthesia provider monitors you  continuously. He or she checks your heart rate and rhythm, blood pressure, breathing, and blood oxygen.  · IV anesthetics. IV anesthetics are given through an IV line in your arm. Theyre often given first. This is so you are asleep before a gas anesthetic is started. Some kinds of IV anesthetics relieve pain. Others relax you. Your doctor will decide which kind is best in your case.  · Gas anesthetics. Gas anesthetics are breathed into the lungs. They are often used to keep you asleep. They can be given through a facemask or a tube placed in your larynx or trachea (breathing tube).  ¨ If you have a facemask, your anesthesia provider will most likely place it over your nose and mouth while youre still awake. Youll breathe oxygen through the mask as your IV anesthetic is started. Gas anesthetic may be added through the mask.  ¨ If you have a tube in the larynx or trachea, it will be inserted into your throat after youre asleep.  Anesthesia tools and medicines  You will likely have:  · IV anesthetics. These are put into an IV line into your bloodstream.  · Gas anesthetics. You breathe these anesthetics into your lungs, where they pass into your bloodstream.  · Pulse oximeter. This is a small clip that is attached to the end of your finger. This measures your blood oxygen level.  · Electrocardiography leads (electrodes). These are small sticky pads that are placed on your chest. They record your heart rate and rhythm.  · Blood pressure cuff. This reads your blood pressure.  Risks and possible complications  General anesthesia has some risks. These include:  · Breathing problems  · Nausea and vomiting  · Sore throat or hoarseness (usually temporary)  · Allergic reaction to the anesthetic  · Irregular heartbeat (rare)  · Cardiac arrest (rare)   Anesthesia safety  · Follow all instructions you are given for how long not to eat or drink before your procedure.  · Be sure your doctor knows what medicines and drugs you  take. This includes over-the-counter medicines, herbs, supplements, alcohol or other drugs. You will be asked when those were last taken.  · Have an adult family member or friend drive you home after the procedure.  · For the first 24 hours after your surgery:  ¨ Do not drive or use heavy equipment.  ¨ Do not make important decisions or sign legal documents. If important decisions or signing legal documents is necessary during the first 24 hours after surgery, have a trusted family member or spouse act on your behalf.  ¨ Avoid alcohol.  ¨ Have a responsible adult stay with you. He or she can watch for problems and help keep you safe.  Date Last Reviewed: 12/1/2016  © 3527-5046 [employer]. [address] [place] [address], [place] [address]. All rights reserved. This information is not intended as a substitute for professional medical care. Always follow your healthcare professional's instructions.

## 2018-11-28 ENCOUNTER — OFFICE VISIT (OUTPATIENT)
Dept: SURGERY | Facility: CLINIC | Age: 48
End: 2018-11-28
Payer: MEDICAID

## 2018-11-28 ENCOUNTER — TELEPHONE (OUTPATIENT)
Dept: OBSTETRICS AND GYNECOLOGY | Facility: CLINIC | Age: 48
End: 2018-11-28

## 2018-11-28 VITALS
SYSTOLIC BLOOD PRESSURE: 136 MMHG | BODY MASS INDEX: 29.34 KG/M2 | HEIGHT: 57 IN | WEIGHT: 136 LBS | DIASTOLIC BLOOD PRESSURE: 78 MMHG | HEART RATE: 76 BPM | TEMPERATURE: 97 F

## 2018-11-28 DIAGNOSIS — D05.12 DUCTAL CARCINOMA IN SITU (DCIS) OF LEFT BREAST: Primary | ICD-10-CM

## 2018-11-28 PROBLEM — D05.10 DCIS (DUCTAL CARCINOMA IN SITU): Status: RESOLVED | Noted: 2018-10-08 | Resolved: 2018-11-28

## 2018-11-28 PROCEDURE — 99999 PR PBB SHADOW E&M-EST. PATIENT-LVL III: CPT | Mod: PBBFAC,,, | Performed by: SURGERY

## 2018-11-28 PROCEDURE — 99024 POSTOP FOLLOW-UP VISIT: CPT | Mod: ,,, | Performed by: SURGERY

## 2018-11-28 PROCEDURE — 99213 OFFICE O/P EST LOW 20 MIN: CPT | Mod: PBBFAC,PO | Performed by: SURGERY

## 2018-11-28 NOTE — PROGRESS NOTES
REFERRING PHYSICIAN:  Parth Wolfe MD    MEDICAL ONCOLOGIST:    Will set up with Dr. Najera  RADIATION ONCOLOGIST:   Will set up with Dr[facility] Avalos at [facility]    DIAGNOSIS:    This is a 48 y.o. female with a stage pTis N0 M0 grade 2 ER + AL + DCIS of the left breast.    TREATMENT SUMMARY:  The patient is status post left partial mastectomy performed at [facility] with focally positive medial margin and now re-excison on 11/16/18.  Final pathology showed LCIS but no residual DCIS.    INTERVAL HISTORY:   Lou Jc comes in for a post-op check.  She denies fever, chills, chest pain or shortness of breath.  Her pain is moderately controlled other than some medial breast pain, likely from some tissue rerrangement.      MEDICATIONS:  Current Outpatient Medications   Medication Sig Dispense Refill    ascorbic acid, vitamin C, (VITAMIN C) 100 MG tablet Take 100 mg by mouth once daily.      b complex vitamins tablet Take 1 tablet by mouth once daily.      clonazePAM (KLONOPIN) 1 MG tablet Take 1 tablet (1 mg total) by mouth every evening. 30 tablet 0    ergocalciferol (ERGOCALCIFEROL) 50,000 unit Cap Take 1 capsule (50,000 Units total) by mouth every 7 days. 12 capsule 3    HYDROcodone-acetaminophen (NORCO) 5-325 mg per tablet Take 1 tablet by mouth every 4 (four) hours as needed for Pain. 15 tablet 0    ketoconazole (NIZORAL) 2 % cream Apply topically once daily. 30 g 0    loratadine (CLARITIN) 10 mg tablet Take 1 tablet (10 mg total) by mouth once daily. 30 tablet 3    naproxen (NAPROSYN) 500 MG tablet Take 1 tablet (500 mg total) by mouth 2 (two) times daily with meals. 90 tablet 1    omeprazole (PRILOSEC) 20 MG capsule Take 1 capsule (20 mg total) by mouth once daily. 30 capsule 4    diclofenac sodium (VOLTAREN) 1 % Gel Apply 2 g topically 4 (four) times daily. 100 g 0     No current facility-administered medications for this visit.        ALLERGIES:   Review of patient's allergies indicates:  No Known  Allergies    PHYSICAL EXAMINATION:   General:  This is a well appearing female with appropriate speech, affect and gait.     Breast:  Incision clean, dry, and intact    IMPRESSION:   The patient has had an uneventful postoperative course.    PLAN:   1. return in 4 months for a follow up office visit and breast exam  2. bilateral mammogram in 6 months from last - Due March as she would be considered high risk, would need alternating MMG and MRI surveillance moving forward  3. The patient is advised in continued exam of the breast chest wall and to report to this office sooner should she note any areas of abnormality or concern.   4.  She has been instructed to meet with med onc (Dr. Najera) and rad onc (Dr. Avalos) for discussion of adjuvant treatment recommendations

## 2018-11-28 NOTE — TELEPHONE ENCOUNTER
----- Message from Gil Sanchez RN sent at 11/28/2018  9:35 AM CST -----  Lisa,           Can you please get this pt of Dr Matias's in to see Dr Dixon? Thanks, Gil akbar/Dr Matias

## 2018-12-04 ENCOUNTER — TUMOR BOARD CONFERENCE (OUTPATIENT)
Dept: SURGERY | Facility: CLINIC | Age: 48
End: 2018-12-04

## 2018-12-04 NOTE — PROGRESS NOTES
DCIS (ductal carcinoma in situ) of breast    8/17/2018 Biopsy     There is a focal asymmetry seen in the outer central region of the left breast 3:00         8/17/2018 Initial Diagnosis     Left breast mass, biopsy:  -Breast tissue with fragmented papilloma  -No evidence of atypia, in-situ, or invasive carcinoma         9/21/2018 Surgery     Left breast, wire localized lumpectomy:  -Ductal carcinoma in situ (DCIS), Grade 2 (intermediate), focally involving the medial margin, present in 5  blocks with an estimated size of at least 20 mm  -Background breast tissue with fibrocystic change including dense fibrosis, papillomatosis, adenosis, cystic  dilatation, apocrine metaplasia, and fibroadenomatoid nodules  -Biopsy site changes  -No evidence of invasive carcinoma         9/21/2018 Tumor Markers     Estrogen Receptor: Positive  Progesterone Receptor: Positive  HER2: N/A           11/5/2018 Genetic Testing     Patient has genetic testing done for Code Blue panel                                       Results revealed patient has the following mutation(s): none         11/16/2018 Surgery     Left breast re-excision  No residual DCIS  LCIS  Margins uninvolved, LCIS is 1 mm from medial and inferior              11/16/2018 Cancer Staged     Cancer Staging  TisNx  Stage 0 per AJCC 8th ed. pathologic prognostic staging system           11/16/2018 Tumor Markers     For LCIS:  Estrogen Receptor: Positive 50-90%  Progesterone Receptor: Positive 10-50%  HER2: 2+, FISH pending  Ki67: <10%         12/4/2018 Tumor Conference     Not much data on LCIS's impact on risk after cancer diagnosis. Will start monitoring her with MRIs for risk     Discussion on how this is a non-atypical papilloma that upstaged. Decision to remove these types of papillomas is usually based on symptoms and patient desire. Also, this papilloma was not near the nipple, which is more concerning.     Discussion regarding endocrine therapy     Radiation for  grade 2, 2cm DCIS for 48 year old.

## 2018-12-06 ENCOUNTER — OFFICE VISIT (OUTPATIENT)
Dept: OBSTETRICS AND GYNECOLOGY | Facility: CLINIC | Age: 48
End: 2018-12-06
Attending: OBSTETRICS & GYNECOLOGY
Payer: MEDICAID

## 2018-12-06 ENCOUNTER — INITIAL CONSULT (OUTPATIENT)
Dept: HEMATOLOGY/ONCOLOGY | Facility: CLINIC | Age: 48
End: 2018-12-06
Payer: MEDICAID

## 2018-12-06 VITALS
SYSTOLIC BLOOD PRESSURE: 121 MMHG | OXYGEN SATURATION: 99 % | TEMPERATURE: 98 F | DIASTOLIC BLOOD PRESSURE: 66 MMHG | BODY MASS INDEX: 29.33 KG/M2 | HEART RATE: 84 BPM | WEIGHT: 145.5 LBS | HEIGHT: 59 IN

## 2018-12-06 VITALS
SYSTOLIC BLOOD PRESSURE: 110 MMHG | DIASTOLIC BLOOD PRESSURE: 72 MMHG | HEIGHT: 59 IN | BODY MASS INDEX: 29.77 KG/M2 | WEIGHT: 147.69 LBS

## 2018-12-06 DIAGNOSIS — Z80.3 FAMILY HISTORY OF BREAST CANCER: ICD-10-CM

## 2018-12-06 DIAGNOSIS — Z17.0 MALIGNANT NEOPLASM OF BREAST IN FEMALE, ESTROGEN RECEPTOR POSITIVE, UNSPECIFIED LATERALITY, UNSPECIFIED SITE OF BREAST: ICD-10-CM

## 2018-12-06 DIAGNOSIS — C50.919 MALIGNANT NEOPLASM OF BREAST IN FEMALE, ESTROGEN RECEPTOR POSITIVE, UNSPECIFIED LATERALITY, UNSPECIFIED SITE OF BREAST: ICD-10-CM

## 2018-12-06 DIAGNOSIS — N95.1 MENOPAUSAL SYMPTOM: Primary | ICD-10-CM

## 2018-12-06 DIAGNOSIS — Z01.419 ENCOUNTER FOR GYNECOLOGICAL EXAMINATION WITHOUT ABNORMAL FINDING: ICD-10-CM

## 2018-12-06 DIAGNOSIS — D05.12 DUCTAL CARCINOMA IN SITU (DCIS) OF LEFT BREAST: Primary | ICD-10-CM

## 2018-12-06 DIAGNOSIS — Z12.4 SCREENING FOR MALIGNANT NEOPLASM OF CERVIX: ICD-10-CM

## 2018-12-06 PROCEDURE — 99386 PREV VISIT NEW AGE 40-64: CPT | Mod: S$GLB,,, | Performed by: OBSTETRICS & GYNECOLOGY

## 2018-12-06 PROCEDURE — 99999 PR PBB SHADOW E&M-EST. PATIENT-LVL III: CPT | Mod: PBBFAC,,, | Performed by: INTERNAL MEDICINE

## 2018-12-06 PROCEDURE — 99999 PR PBB SHADOW E&M-EST. PATIENT-LVL III: ICD-10-PCS | Mod: PBBFAC,,, | Performed by: INTERNAL MEDICINE

## 2018-12-06 PROCEDURE — 99205 OFFICE O/P NEW HI 60 MIN: CPT | Mod: S$PBB,,, | Performed by: INTERNAL MEDICINE

## 2018-12-06 PROCEDURE — 99213 OFFICE O/P EST LOW 20 MIN: CPT | Mod: PBBFAC | Performed by: INTERNAL MEDICINE

## 2018-12-06 PROCEDURE — 99205 PR OFFICE/OUTPT VISIT, NEW, LEVL V, 60-74 MIN: ICD-10-PCS | Mod: S$PBB,,, | Performed by: INTERNAL MEDICINE

## 2018-12-06 PROCEDURE — 88175 CYTOPATH C/V AUTO FLUID REDO: CPT

## 2018-12-06 PROCEDURE — 87624 HPV HI-RISK TYP POOLED RSLT: CPT

## 2018-12-06 NOTE — LETTER
December 6, 2018      Prabha Matias MD  [address]  [facility] [address]           [facility]  [address], [address]  [facility] [address]  Phone: [phone]  Fax: [phone]          Patient: Lou Jc   MR Number: [id number]   YOB: 1970   Date of Visit: 12/6/2018       Dear Dr. Prabha Matias:    Thank you for referring Lou Jc to me for evaluation. Attached you will find relevant portions of my assessment and plan of care.    If you have questions, please do not hesitate to call me. I look forward to following Lou Jc along with you.    Sincerely,    Erna Davis MD    Enclosure  CC:  No Recipients    If you would like to receive this communication electronically, please contact [email or web address] or [phone] to request more information on [facility] Link access.    For providers and/or their staff who would like to refer a patient to [facility], please contact us through our one-stop-shop provider referral line, [facility], at [phone].    If you feel you have received this communication in error or would no longer like to receive these types of communications, please e-mail [email or web address]
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
## 2018-12-06 NOTE — PROGRESS NOTES
Subjective:       Patient ID: Lou Jc is a 48 y.o. female.    Chief Complaint: Consult    HPI   REASON FOR CONSULTATION: Lt Breast CA  REFERRING PHYSICIAN: Prabha Matias MD       Pt  is a 48 y.o. female seen today in consultation for  newly diagnosed Left  breast cancer. She was initially seen at [facility] ([facility] in [place], [place]). She had a BI-RADS 0 bilateral screening mammogram for architectural distortion and subsequent bilateral diagnostic mammogram and left breast ultrasound that were BI-RADS 4 and revealed  focal asymmetry at the outer central position left breast She subsequently underwent ultrasound-guided core needle biopsy on 8/17/18 with pathology showing fragmented papilloma with no evidence of atypia or in-situ or invasive carcinoma. She was then seen by General Surgery at [facility] on 8/31/18 to discuss wire localization excisional biopsy of this intraductal papilloma, which ultimately occurred on 9/21/18. Final pathology from the excision biopsy upstaged to a 2.0 cm grade 2 ER+ (moderate) ND+ (moderate) ductal carcinoma in situ with focally positive medial margin with no evidence of invasive carcinoma.  She subsequently underwent  re-excison on 11/16/18. Final pathology showed LCIS but no residual DCIS. Margins uninvolved with closest margins are medial and inferior - 1mm, superior 6mm.  She routinely performs  self breast exams. She  has not noted a change on breast exam. Patient denies nipple discharge. She reports she has not undergone annual routine screening mammograms. She reports history of previous breast bx in 2015- pathologically benign. She recently underwent [facility] testing with was negative . She is here for further evaluation.            Risk Factors/Breast History Age of Menarche: 10y.o. Age of Menopause: 48. LMP 2016. History of Hormonal Therapy: OCPs x 4 years. No HRT.Nulliparous Family History of Breast Ca: Maternal aunt (age 60s). Maternal aunt (age 70s).  "Paternal aunt (age 70s).  No Family hx of Ovarian Cancer.  Other Family History: Father with pancreatic cancer.      Social History .She is a chronic smoker. She has smoked 1ppd x 25 yrs. She drinks 2 beers/week.           Past Medical History:   Diagnosis Date    Allergy     Breast cyst     DCIS (ductal carcinoma in situ)     Left    Endometriosis     Papilloma of breast     Reflux          Past Surgical History:   Procedure Laterality Date    BREAST BIOPSY Left     papilloma    BREAST CYST ASPIRATION Left     BREAST LUMPECTOMY      2 lumpectomy    diagnostic lap      LUMPECTOMY, BREAST-re-ecxision Left 11/16/2018    [facility] at [facility] OR 2ND FLR    LUMPECTOMY,BREAST Left 9/21/2018    [facility] at [facility] OR    TONSILLECTOMY         Review of Systems   Constitutional: Negative for appetite change, fatigue, fever and unexpected weight change.   HENT: Negative for mouth sores.    Eyes: Negative for visual disturbance.   Respiratory: Negative for cough and shortness of breath.    Cardiovascular: Negative for chest pain.   Gastrointestinal: Negative for abdominal pain and diarrhea.   Genitourinary: Negative for frequency.   Musculoskeletal: Negative for back pain.   Skin: Negative for rash.   Neurological: Negative for headaches.   Hematological: Negative for adenopathy.   Psychiatric/Behavioral: The patient is not nervous/anxious.        Objective:        Vitals:    12/06/18 1406   BP: 121/66   BP Location: Right arm   Patient Position: Sitting   BP Method: Medium (Automatic)   Pulse: 84   Temp: 98.1 °F (36.7 °C)   TempSrc: Oral   SpO2: 99%   Weight: 66 kg (145 lb 8.1 oz)   Height: 4' 11" (1.499 m)       Physical Exam   Constitutional: She is oriented to person, place, and time. She appears well-developed and well-nourished.   HENT:   Head: Normocephalic.   Mouth/Throat: Oropharynx is clear and moist. No oropharyngeal exudate.   Eyes: Conjunctivae and lids are normal. " Pupils are equal, round, and reactive to light. No scleral icterus.   Neck: Normal range of motion. Neck supple. No thyromegaly present.   Cardiovascular: Normal rate, regular rhythm and normal heart sounds.   No murmur heard.  Pulmonary/Chest: Breath sounds normal. She has no wheezes. She has no rales.   Abdominal: Soft. Bowel sounds are normal. She exhibits no distension and no mass. There is no hepatosplenomegaly. There is no tenderness. There is no rebound and no guarding.   Musculoskeletal: Normal range of motion. She exhibits no edema or tenderness.   Lymphadenopathy:     She has no cervical adenopathy.     She has no axillary adenopathy.        Right: No supraclavicular adenopathy present.        Left: No supraclavicular adenopathy present.   Neurological: She is alert and oriented to person, place, and time. No cranial nerve deficit. Coordination normal.   Skin: Skin is warm and dry. No ecchymosis, no petechiae and no rash noted. No erythema.   Psychiatric: She has a normal mood and affect.           FINAL PATHOLOGIC DIAGNOSIS 9/21/2018   Left breast, wire localized lumpectomy:  -Ductal carcinoma in situ (DCIS), Grade 2 (intermediate), focally involving the medial margin, present in 5  blocks with an estimated size of at least 20 mm (see cancer case summary below)  -Background breast tissue with fibrocystic change including dense fibrosis, papillomatosis, adenosis, cystic  dilatation, apocrine metaplasia, and fibroadenomatoid nodules  -Biopsy site changes  -No evidence of invasive carcinoma  Surgical pathology cancer case summary:  -Procedure: Excision (less than total mastectomy)  -Specimen laterality: Left  -Size (extent) of DCIS: Estimated size of DCIS: At least 20 mm: Number blocks with DCIS: 5  -Histologic type: Ductal carcinoma in situ  -Architectural patterns: Solid  -Nuclear grade: Grade 2 (intermediate)  -Necrosis: Not identified  -Margins: Medial margin focally positive for DCIS  -Regional lymph  nodes: No lymph nodes submitted or found  -Pathologic stage classification (pTNM):  Primary tumor (pT): pTis (DCIS): Ductal carcinoma in situ  Regional lymph nodes (pN): pNX: Regional lymph nodes cannot be assessed  -Microcalcifications: Present in DCIS and nonneoplastic tissue    Hormone receptor results (performed with appropriate controls):  ER - Positive (moderate)  MT - Positive (moderate)        FINAL PATHOLOGIC DIAGNOSIS  11/16/2018    1. LEFT BREAST, RE-EXCISION:  No residual ductal carcinoma in-situ  Lobular carcinoma in-situ  Margins negative for neoplasia or malignancy (closest margins are medial and inferior - 1mm, superior 6mm)  Atypical lobular hyperplasia  Intraductal papilloma  Negative for malignancy  Extensive previous procedure related changes present  (specimen has been submitted in it's entirety)  2. NEW ANTERIOR MARGIN, EXCISION:  Benign skin and subcutaneous tissue with extensive inflammation and procedure related changes  Negative for neoplasia or malignancy  (specimen has been submitted in its entirety)  Part1.  Immunostain E-Cadherin has been performed (with appropriate controls) on two sections #1C AND 1G and is  negative, supporting the above diagnosis.  Hormone receptors performed for LCIS (Part 1)  ER - Positive (90% of the tumor nuclei, moderate to strong)  MT - Positive (30% of the tumor nuclei, weak)  HER2 - Indeterminate (2+), specimen will be sent out for FISH analysis  Ki67 - 5%, positive tumor nuclei  [id number]      Lab Results   Component Value Date    WBC 8.00 09/21/2018    HGB 14.2 09/21/2018    HCT 44.4 09/21/2018    MCV 93 09/21/2018     09/21/2018       Assessment:       1. Ductal carcinoma in situ (DCIS) of left breast    2. Family history of breast cancer        Plan:     Patient is a 48 y.o. female with recently diagnosed  stage pTis N0 M0 grade 2 ER + MT + DCIS of the left breast status post left partial mastectomy with focally positive medial margin s/p  re-excison on 11/16/18.  Final pathology showed LCIS but no residual DCIS with negative  with closest margins are medial and inferior - 1mm.  Pt is a candidate for chemoprevention  .  The pros and cons of such an approach were explained to her in detail. Patient was informed of the potential SE ( including but not limited to )  the 30% risk of hot flashes, 30% risk of diffuse joint pains and 1% per year risk of thrombosis and effects on bone health.  Pt was provided with handout on planned therapy. She will need to undergo baseline bone density and q1-2 yrs while undergoing therapy. In addition, discussed with patient   the dangers of continued smoking and the effects on her health and offered her smoking cessation however pt declined.  Patient has follow-up with Radiation Oncology to discuss adjuvant radiation therapy to reduce local recurrence risk.  It has been recommended that she undergo a bilateral mammogram alternating with MRI surveillance moving forward.  She will continue with monthly self-breast exams.  She has undergone genetic testing which has been negative.  All questions posed answered to patient's satisfaction    Thank you for allowing me to participate in the care of your patient       Cc: Prabha Matias MD

## 2018-12-12 LAB
HPV HR 12 DNA CVX QL NAA+PROBE: NEGATIVE
HPV16 AG SPEC QL: NEGATIVE
HPV18 DNA SPEC QL NAA+PROBE: NEGATIVE

## 2018-12-18 DIAGNOSIS — D05.12 DUCTAL CARCINOMA IN SITU (DCIS) OF LEFT BREAST: Primary | ICD-10-CM

## 2019-01-10 ENCOUNTER — TELEPHONE (OUTPATIENT)
Dept: SURGERY | Facility: CLINIC | Age: 49
End: 2019-01-10

## 2019-01-10 ENCOUNTER — OFFICE VISIT (OUTPATIENT)
Dept: HEMATOLOGY/ONCOLOGY | Facility: CLINIC | Age: 49
End: 2019-01-10
Payer: MEDICAID

## 2019-01-10 VITALS
TEMPERATURE: 98 F | OXYGEN SATURATION: 97 % | WEIGHT: 136.88 LBS | HEIGHT: 60 IN | SYSTOLIC BLOOD PRESSURE: 123 MMHG | HEART RATE: 83 BPM | DIASTOLIC BLOOD PRESSURE: 82 MMHG | BODY MASS INDEX: 26.87 KG/M2

## 2019-01-10 DIAGNOSIS — Z78.0 POSTMENOPAUSAL: ICD-10-CM

## 2019-01-10 DIAGNOSIS — D05.12 DUCTAL CARCINOMA IN SITU (DCIS) OF LEFT BREAST: Primary | ICD-10-CM

## 2019-01-10 PROCEDURE — 99999 PR PBB SHADOW E&M-EST. PATIENT-LVL IV: CPT | Mod: PBBFAC,,, | Performed by: INTERNAL MEDICINE

## 2019-01-10 PROCEDURE — 99214 OFFICE O/P EST MOD 30 MIN: CPT | Mod: S$PBB,,, | Performed by: INTERNAL MEDICINE

## 2019-01-10 PROCEDURE — 99214 OFFICE O/P EST MOD 30 MIN: CPT | Mod: PBBFAC | Performed by: INTERNAL MEDICINE

## 2019-01-10 PROCEDURE — 99214 PR OFFICE/OUTPT VISIT, EST, LEVL IV, 30-39 MIN: ICD-10-PCS | Mod: S$PBB,,, | Performed by: INTERNAL MEDICINE

## 2019-01-10 PROCEDURE — 99999 PR PBB SHADOW E&M-EST. PATIENT-LVL IV: ICD-10-PCS | Mod: PBBFAC,,, | Performed by: INTERNAL MEDICINE

## 2019-01-10 RX ORDER — ANASTROZOLE 1 MG/1
1 TABLET ORAL DAILY
Qty: 30 TABLET | Refills: 0 | Status: SHIPPED | OUTPATIENT
Start: 2019-02-01 | End: 2019-03-03

## 2019-01-10 NOTE — PROGRESS NOTES
Subjective:       Patient ID: Lou Jc is a 48 y.o. female.    Chief Complaint: Follow-up    HPI     Diagnosis: Stage 0  pTis N0 M0 grade 2 ER + AL + DCIS of the lt breast s/p lt partial mastectomy with positive margin s/p re-excison on 11/16/18        HPI: Pt is a 48 y.o. female seen today for  Left breast cancer. She was initially seen at [facility] ([facility] in [place], [place]). She had a BI-RADS 0 bilateral screening mammogram for architectural distortion and subsequent bilateral diagnostic mammogram and left breast ultrasound that were BI-RADS 4 and revealed focal asymmetry at the outer central position left breast She subsequently underwent ultrasound-guided core needle biopsy on 8/17/18 with pathology showing fragmented papilloma with no evidence of atypia or in-situ or invasive carcinoma. She was then seen by General Surgery at [facility] on 8/31/18 to discuss wire localization excisional biopsy of this intraductal papilloma, which ultimately occurred on 9/21/18. Final pathology from the excision biopsy upstaged to a 2.0 cm grade 2 ER+ (moderate) AL+ (moderate) ductal carcinoma in situ with focally positive medial margin with no evidence of invasive carcinoma. She subsequently underwent re-excison on 11/16/18. Final pathology showed LCIS but no residual DCIS. Margins uninvolved with closest margins are medial and inferior - 1mm, superior 6mm. She routinely performs self breast exams. She has not noted a change on breast exam. Patient denies nipple discharge. She reports she has not undergone annual routine screening mammograms. She reports history of previous breast bx in 2015- pathologically benign. She recently underwent [facility] testing with was negative .       She is followed by Rad/Onc  She is undergoing postoperative XRT left breast under direction of Dr. Vázquez at [facility]  Tentative completion date Jan 23rd 2019       Today, she is doing well  No new issues  No SOB/CP        Risk  Factors/Breast History Age of Menarche: 10y.o. Age of Menopause: 48. LMP 2016. History of Hormonal Therapy: OCPs x 4 years. No HRT.Nulliparous Family History of Breast Ca: Maternal aunt (age 60s). Maternal aunt (age 70s). Paternal aunt (age 70s).   No Family hx of Ovarian Cancer. Other Family History: Father with pancreatic cancer.       Social History .She is a chronic smoker. She has smoked 1ppd x 25 yrs. She drinks 2 beers/week.           She is undergoing postoperative XRT left breast under direction of Dr. Vázquez at [facility]  Tentative completion date Jan 23rd 2019   She is tolerating XRT w/out complications        Past Medical History:   Diagnosis Date    Allergy     Breast cyst     DCIS (ductal carcinoma in situ)     Left    Endometriosis     Papilloma of breast     Reflux          Past Surgical History:   Procedure Laterality Date    BREAST BIOPSY Left     papilloma    BREAST CYST ASPIRATION Left     BREAST LUMPECTOMY      2 lumpectomy    diagnostic lap      LUMPECTOMY, BREAST-re-ecxision Left 11/16/2018    Performed by [facility] at [facility] 2ND FLR    LUMPECTOMY,BREAST Left 9/21/2018    Performed by [facility]ha [facility]ias [facility] at [facility] OR    TONSILLECTOMY         Review of Systems   Constitutional: Negative for appetite change, fatigue, fever and unexpected weight change.   HENT: Negative for mouth sores.    Eyes: Negative for visual disturbance.   Respiratory: Negative for cough and shortness of breath.    Cardiovascular: Negative for chest pain.   Gastrointestinal: Negative for abdominal pain and diarrhea.   Genitourinary: Negative for frequency.   Musculoskeletal: Negative for back pain.   Skin: Negative for rash.   Neurological: Negative for headaches.   Hematological: Negative for adenopathy.   Psychiatric/Behavioral: The patient is nervous/anxious.        Objective:        Vitals:    01/10/19 0846   BP: 123/82   BP Location: Right arm   Patient Position: Sitting   BP Method: Large (Automatic)  "  Pulse: 83   Temp: 97.9 °F (36.6 °C)   TempSrc: Oral   SpO2: 97%   Weight: 62.1 kg (136 lb 14.5 oz)   Height: 4' 11.5" (1.511 m)       Physical Exam   Constitutional: She is oriented to person, place, and time. She appears well-developed and well-nourished.   HENT:   Head: Normocephalic.   Mouth/Throat: Oropharynx is clear and moist. No oropharyngeal exudate.   Eyes: Conjunctivae and lids are normal. Pupils are equal, round, and reactive to light. No scleral icterus.   Neck: Normal range of motion. Neck supple. No thyromegaly present.   Cardiovascular: Normal rate, regular rhythm and normal heart sounds.   No murmur heard.  Pulmonary/Chest: Breath sounds normal. She has no wheezes. She has no rales.   Abdominal: Soft. Bowel sounds are normal. She exhibits no distension and no mass. There is no hepatosplenomegaly. There is no tenderness. There is no rebound and no guarding.   Musculoskeletal: Normal range of motion. She exhibits no edema or tenderness.   Lymphadenopathy:     She has no cervical adenopathy.     She has no axillary adenopathy.        Right: No supraclavicular adenopathy present.        Left: No supraclavicular adenopathy present.   Neurological: She is alert and oriented to person, place, and time. No cranial nerve deficit. Coordination normal.   Skin: Skin is warm and dry. No ecchymosis, no petechiae and no rash noted. No erythema.   Psychiatric: She has a normal mood and affect.         Lab Results   Component Value Date    WBC 6.56 02/28/2019    HGB 13.8 02/28/2019    HCT 42.0 02/28/2019    MCV 92 02/28/2019     02/28/2019     Results for STEFFANIE JIMENEZ (MRN [id number]) as of 1/10/2019 09:20   Ref. Range 12/6/2018 10:27   Estradiol Latest Ref Range: See Text pg/mL <10 (A)   Testosterone, Free Latest Units: pg/mL 1.8     Ultrasensitive estradiol ( [facility]) 4pg/ml ( postmenopausal)     Assessment:       1. Ductal carcinoma in situ (DCIS) of left breast    2. Postmenopausal        Plan: "     Patient is a 48 y.o. female with  Stage 0 pTis N0 M0 grade 2 ER + FL + DCIS of the left breast status post left partial mastectomy with focally positive medial margin s/p re-excison on 11/16/18.  Final pathology showed LCIS but no residual DCIS with negative  with closest margins are medial and inferior - 1mm    She will continue with adjuvant XRT as planned under the direction of Rad/Onc at [facility]     We revisited issue of endocrine therapy as chemoprevention  LMP 2016  Hormone testing - postmenopausal  [facility], negative Integrated BRACAnalysis with Mahendra  Plan baseline bone density   Pt previously provided with handout on planned therapy      All questions posed answered to patient's satisfaction    Cc: MD Maryjo Pagan MD

## 2019-01-10 NOTE — Clinical Note
Bone denstiy at Dr Wolfe's office ( pt's pcp)Handout on AICbc,cmp prior to f/u Note from Rad/onc -Dr. Vázquez

## 2019-01-10 NOTE — TELEPHONE ENCOUNTER
----- Message from Ciera Patel sent at 1/10/2019 10:24 AM CST -----  Contact: Patient   Needs Advice    Reason for call: Patient is requesting to speak with Gil or a nurse, patient states that she has a lump on her left breast that she had surgery on. Patient is concerned about what the mass is, it feels like a bump and she has a few of them. Patient is currently undergoing radiation.        Communication Preference: [phone]    Additional Information: please contact the patient

## 2019-01-10 NOTE — TELEPHONE ENCOUNTER
Return call to pt. States she has raised bumps on the surface of her skin that was not there before. Pt had radiation treatment 11 today and had 9 more to go. States she asked the radiation and medical oncologist if this is a result of having radiation and was told no, and to make appt with her surgeon.Pt informed that Dr Matias is out on maternity leave and that we can have her see Dr Davey tomorrow. Appt scheduled at 12:30 pm, (pt has radiation appt in the morning on [place]). Pt accepts this appt and understand that it will take place at the [facility].

## 2019-01-11 ENCOUNTER — OFFICE VISIT (OUTPATIENT)
Dept: SURGERY | Facility: CLINIC | Age: 49
End: 2019-01-11
Payer: MEDICAID

## 2019-01-11 VITALS
SYSTOLIC BLOOD PRESSURE: 115 MMHG | BODY MASS INDEX: 27.42 KG/M2 | HEIGHT: 59 IN | TEMPERATURE: 98 F | WEIGHT: 136 LBS | DIASTOLIC BLOOD PRESSURE: 73 MMHG | HEART RATE: 93 BPM

## 2019-01-11 DIAGNOSIS — D05.12 BREAST NEOPLASM, TIS (DCIS), LEFT: Primary | ICD-10-CM

## 2019-01-11 PROCEDURE — 99024 PR POST-OP FOLLOW-UP VISIT: ICD-10-PCS | Mod: S$PBB,,, | Performed by: SURGERY

## 2019-01-11 PROCEDURE — 99213 OFFICE O/P EST LOW 20 MIN: CPT | Mod: PBBFAC,PO | Performed by: SURGERY

## 2019-01-11 PROCEDURE — 99024 POSTOP FOLLOW-UP VISIT: CPT | Mod: S$PBB,,, | Performed by: SURGERY

## 2019-01-11 PROCEDURE — 99999 PR PBB SHADOW E&M-EST. PATIENT-LVL III: CPT | Mod: PBBFAC,,, | Performed by: SURGERY

## 2019-01-11 PROCEDURE — 99999 PR PBB SHADOW E&M-EST. PATIENT-LVL III: ICD-10-PCS | Mod: PBBFAC,,, | Performed by: SURGERY

## 2019-01-11 NOTE — PROGRESS NOTES
Patient called concerned about pain tot he treated left breast in the middle of radiation  Couldn't be seen by radiation oncologist at [facility] so she came here  I reviewed her records  She had DCIS completely excised  She is in the third week of treatment  She exhibits some changes of radiotherapy with some minor breast firmness and tenderness  Skin darkening has just started  Her ribs and intercostal muscles are exquisitely tender  Patient reassured that there is no indication of healing problem and no worries about her expected response to treatment

## 2019-01-18 ENCOUNTER — HOSPITAL ENCOUNTER (OUTPATIENT)
Dept: RADIOLOGY | Facility: HOSPITAL | Age: 49
Discharge: HOME | End: 2019-01-18
Attending: INTERNAL MEDICINE
Payer: MEDICAID

## 2019-01-18 DIAGNOSIS — Z78.0 POSTMENOPAUSAL: ICD-10-CM

## 2019-01-18 PROCEDURE — 77080 DXA BONE DENSITY AXIAL: CPT | Mod: 26,,, | Performed by: RADIOLOGY

## 2019-01-18 PROCEDURE — 77080 DEXA BONE DENSITY SPINE HIP: ICD-10-PCS | Mod: 26,,, | Performed by: RADIOLOGY

## 2019-01-18 PROCEDURE — 77080 DXA BONE DENSITY AXIAL: CPT | Mod: TC

## 2019-02-28 ENCOUNTER — LAB VISIT (OUTPATIENT)
Dept: LAB | Facility: HOSPITAL | Age: 49
End: 2019-02-28
Attending: INTERNAL MEDICINE
Payer: MEDICAID

## 2019-02-28 DIAGNOSIS — D05.12 DUCTAL CARCINOMA IN SITU (DCIS) OF LEFT BREAST: ICD-10-CM

## 2019-02-28 LAB
ALBUMIN SERPL BCP-MCNC: 4.4 G/DL
ALP SERPL-CCNC: 128 U/L
ALT SERPL W/O P-5'-P-CCNC: 30 U/L
ANION GAP SERPL CALC-SCNC: 6 MMOL/L
AST SERPL-CCNC: 21 U/L
BASOPHILS # BLD AUTO: 0.02 K/UL
BASOPHILS NFR BLD: 0.3 %
BILIRUB SERPL-MCNC: 0.4 MG/DL
BUN SERPL-MCNC: 14 MG/DL
CALCIUM SERPL-MCNC: 10 MG/DL
CHLORIDE SERPL-SCNC: 105 MMOL/L
CO2 SERPL-SCNC: 29 MMOL/L
CREAT SERPL-MCNC: 0.8 MG/DL
DIFFERENTIAL METHOD: NORMAL
EOSINOPHIL # BLD AUTO: 0.2 K/UL
EOSINOPHIL NFR BLD: 3.7 %
ERYTHROCYTE [DISTWIDTH] IN BLOOD BY AUTOMATED COUNT: 13.3 %
EST. GFR  (AFRICAN AMERICAN): >60 ML/MIN/1.73 M^2
EST. GFR  (NON AFRICAN AMERICAN): >60 ML/MIN/1.73 M^2
GLUCOSE SERPL-MCNC: 113 MG/DL
HCT VFR BLD AUTO: 42 %
HGB BLD-MCNC: 13.8 G/DL
LYMPHOCYTES # BLD AUTO: 1.4 K/UL
LYMPHOCYTES NFR BLD: 20.9 %
MCH RBC QN AUTO: 30.1 PG
MCHC RBC AUTO-ENTMCNC: 32.9 G/DL
MCV RBC AUTO: 92 FL
MONOCYTES # BLD AUTO: 0.4 K/UL
MONOCYTES NFR BLD: 5.9 %
NEUTROPHILS # BLD AUTO: 4.5 K/UL
NEUTROPHILS NFR BLD: 69.2 %
PLATELET # BLD AUTO: 230 K/UL
PMV BLD AUTO: 10.9 FL
POTASSIUM SERPL-SCNC: 4 MMOL/L
PROT SERPL-MCNC: 7.3 G/DL
RBC # BLD AUTO: 4.59 M/UL
SODIUM SERPL-SCNC: 140 MMOL/L
WBC # BLD AUTO: 6.56 K/UL

## 2019-02-28 PROCEDURE — 36415 COLL VENOUS BLD VENIPUNCTURE: CPT

## 2019-02-28 PROCEDURE — 85025 COMPLETE CBC W/AUTO DIFF WBC: CPT

## 2019-02-28 PROCEDURE — 80053 COMPREHEN METABOLIC PANEL: CPT

## 2019-03-07 ENCOUNTER — OFFICE VISIT (OUTPATIENT)
Dept: HEMATOLOGY/ONCOLOGY | Facility: CLINIC | Age: 49
End: 2019-03-07
Payer: MEDICAID

## 2019-03-07 VITALS
DIASTOLIC BLOOD PRESSURE: 70 MMHG | HEART RATE: 81 BPM | BODY MASS INDEX: 26.1 KG/M2 | TEMPERATURE: 98 F | WEIGHT: 138.25 LBS | OXYGEN SATURATION: 99 % | SYSTOLIC BLOOD PRESSURE: 145 MMHG | HEIGHT: 61 IN

## 2019-03-07 DIAGNOSIS — M85.80 OSTEOPENIA, UNSPECIFIED LOCATION: ICD-10-CM

## 2019-03-07 DIAGNOSIS — Z90.12 HISTORY OF PARTIAL MASTECTOMY OF LEFT BREAST: ICD-10-CM

## 2019-03-07 DIAGNOSIS — Z92.3 HISTORY OF RADIATION THERAPY: ICD-10-CM

## 2019-03-07 DIAGNOSIS — D05.12 DUCTAL CARCINOMA IN SITU (DCIS) OF LEFT BREAST: Primary | ICD-10-CM

## 2019-03-07 PROCEDURE — 99214 PR OFFICE/OUTPT VISIT, EST, LEVL IV, 30-39 MIN: ICD-10-PCS | Mod: S$PBB,,, | Performed by: INTERNAL MEDICINE

## 2019-03-07 PROCEDURE — 99999 PR PBB SHADOW E&M-EST. PATIENT-LVL IV: CPT | Mod: PBBFAC,,, | Performed by: INTERNAL MEDICINE

## 2019-03-07 PROCEDURE — 99214 OFFICE O/P EST MOD 30 MIN: CPT | Mod: S$PBB,,, | Performed by: INTERNAL MEDICINE

## 2019-03-07 PROCEDURE — 99999 PR PBB SHADOW E&M-EST. PATIENT-LVL IV: ICD-10-PCS | Mod: PBBFAC,,, | Performed by: INTERNAL MEDICINE

## 2019-03-07 PROCEDURE — 99214 OFFICE O/P EST MOD 30 MIN: CPT | Mod: PBBFAC | Performed by: INTERNAL MEDICINE

## 2019-03-07 RX ORDER — ANASTROZOLE 1 MG/1
1 TABLET ORAL DAILY
Qty: 30 TABLET | Refills: 2 | Status: SHIPPED | OUTPATIENT
Start: 2019-03-07 | End: 2019-04-06

## 2019-03-07 NOTE — PROGRESS NOTES
Subjective:       Patient ID: Lou Jc is a 48 y.o. female.    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 with positive margin s/p re-excison on 11/16/18  S/p adjuvant XRT left breast under direction of Dr. Vázquez at [facility] on 1/25/2019           HPI: Pt is a 48 y.o. female seen today for f/u for   Left breast cancer. She was initially seen at [facility] ([facility] in [place], [place]). She had a BI-RADS 0 bilateral screening mammogram for architectural distortion and subsequent bilateral diagnostic mammogram and left breast ultrasound that were BI-RADS 4 and revealed focal asymmetry at the outer central position left breast She subsequently underwent ultrasound-guided core needle biopsy on 8/17/18 with pathology showing fragmented papilloma with no evidence of atypia or in-situ or invasive carcinoma. She was then seen by General Surgery at [facility] on 8/31/18 to discuss wire localization excisional biopsy of this intraductal papilloma, which ultimately occurred on 9/21/18. Final pathology from the excision biopsy upstaged to a 2.0 cm grade 2 ER+ (moderate) ME+ (moderate) ductal carcinoma in situ with focally positive medial margin with no evidence of invasive carcinoma. She subsequently underwent re-excison on 11/16/18. Final pathology showed LCIS but no residual DCIS. Margins uninvolved with closest margins are medial and inferior - 1mm, superior 6mm. She routinely performs self breast exams. She has not noted a change on breast exam. Patient denies nipple discharge. She reports she has not undergone annual routine screening mammograms. She reports history of previous breast bx in 2015- pathologically benign. She recently underwent [facility] testing with was negative .         She is followed by Rad/Onc  She has completed adjuvant XRT left breast under direction of Dr. Vázquez at [facility] on 1/25/2019    Tentative completion date Jan 23rd 2019         Today, she is doing well  No new issues  No SOB/CP  Appetite and weight stable            Risk Factors/Breast History Age of Menarche: 10y.o. Age of Menopause: 48. LMP 2016. History of Hormonal Therapy: OCPs x 4 years. No HRT.Nulliparous Family History of Breast Ca: Maternal aunt (age 60s). Maternal aunt (age 70s). Paternal aunt (age 70s).   No Family hx of Ovarian Cancer. Other Family History: Father with pancreatic cancer.         Social History .She is a chronic smoker. She has smoked 1ppd x 25 yrs. She drinks 2 beers/week.            Past Medical History:   Diagnosis Date    Allergy     Breast cyst     DCIS (ductal carcinoma in situ)     Left    Endometriosis     Papilloma of breast     Reflux          Past Surgical History:   Procedure Laterality Date    BREAST BIOPSY Left     papilloma    BREAST CYST ASPIRATION Left     BREAST LUMPECTOMY      2 lumpectomy    diagnostic lap      LUMPECTOMY, BREAST-re-ecxision Left 11/16/2018    [facility] 2ND FLR    LUMPECTOMY,BREAST Left 9/21/2018    [facility] at [facility] OR    TONSILLECTOMY         Review of Systems   Constitutional: Negative for appetite change, fatigue, fever and unexpected weight change.   HENT: Negative for mouth sores.    Eyes: Negative for visual disturbance.   Respiratory: Negative for cough and shortness of breath.    Cardiovascular: Negative for chest pain.   Gastrointestinal: Negative for abdominal pain and diarrhea.   Genitourinary: Negative for frequency.   Musculoskeletal: Negative for back pain.   Skin: Negative for rash.   Neurological: Negative for headaches.   Hematological: Negative for adenopathy.   Psychiatric/Behavioral: The patient is nervous/anxious.        Objective:        Vitals:    03/07/19 0951   BP: (!) 145/70   BP Location: Left arm   Patient Position: Sitting   BP Method: Medium (Automatic)   Pulse: 81   Temp: 98.3 °F  "(36.8 °C)   TempSrc: Oral   SpO2: 99%   Weight: 62.7 kg (138 lb 3.7 oz)   Height: 5' 1" (1.549 m)       Physical Exam   Constitutional: She is oriented to person, place, and time. She appears well-developed and well-nourished.   HENT:   Head: Normocephalic.   Eyes: Conjunctivae and lids are normal. No scleral icterus.   Neck: Normal range of motion. Neck supple. No thyromegaly present.   Cardiovascular: Normal rate, regular rhythm and normal heart sounds.   No murmur heard.  Pulmonary/Chest: Breath sounds normal. She has no wheezes. She has no rales.   Lt breast- minor skin changes noted at radiation site       Abdominal: Soft. Bowel sounds are normal. She exhibits no distension and no mass. There is no hepatosplenomegaly. There is no tenderness. There is no rebound and no guarding.   Musculoskeletal: Normal range of motion. She exhibits no edema or tenderness.   Lymphadenopathy:     She has no cervical adenopathy.     She has no axillary adenopathy.        Right: No supraclavicular adenopathy present.        Left: No supraclavicular adenopathy present.   Neurological: She is alert and oriented to person, place, and time. No cranial nerve deficit. Coordination normal.   Skin: Skin is warm and dry. No ecchymosis, no petechiae and no rash noted. No erythema.   Psychiatric: She has a normal mood and affect.         Lab Results   Component Value Date    WBC 6.56 02/28/2019    HGB 13.8 02/28/2019    HCT 42.0 02/28/2019    MCV 92 02/28/2019     02/28/2019     Results for STEFFANIE JIMENEZ (MRN [id number]) as of 1/10/2019 09:20   Ref. Range 12/6/2018 10:27   Estradiol Latest Ref Range: See Text pg/mL <10 (A)   Testosterone, Free Latest Units: pg/mL 1.8     FINAL PATHOLOGIC DIAGNOSIS 9/21/2018   Left breast, wire localized lumpectomy:  -Ductal carcinoma in situ (DCIS), Grade 2 (intermediate), focally involving the medial margin, present in 5  blocks with an estimated size of at least 20 mm (see cancer case summary " below)  -Background breast tissue with fibrocystic change including dense fibrosis, papillomatosis, adenosis, cystic  dilatation, apocrine metaplasia, and fibroadenomatoid nodules  -Biopsy site changes  -No evidence of invasive carcinoma  Surgical pathology cancer case summary:  -Procedure: Excision (less than total mastectomy)  -Specimen laterality: Left  -Size (extent) of DCIS: Estimated size of DCIS: At least 20 mm: Number blocks with DCIS: 5  -Histologic type: Ductal carcinoma in situ  -Architectural patterns: Solid  -Nuclear grade: Grade 2 (intermediate)  -Necrosis: Not identified  -Margins: Medial margin focally positive for DCIS  -Regional lymph nodes: No lymph nodes submitted or found  -Pathologic stage classification (pTNM):  Primary tumor (pT): pTis (DCIS): Ductal carcinoma in situ  Regional lymph nodes (pN): pNX: Regional lymph nodes cannot be assessed  -Microcalcifications: Present in DCIS and nonneoplastic tissue     Hormone receptor results (performed with appropriate controls):  ER - Positive (moderate)  DC - Positive (moderate)           FINAL PATHOLOGIC DIAGNOSIS  11/16/2018    1. LEFT BREAST, RE-EXCISION:  No residual ductal carcinoma in-situ  Lobular carcinoma in-situ  Margins negative for neoplasia or malignancy (closest margins are medial and inferior - 1mm, superior 6mm)  Atypical lobular hyperplasia  Intraductal papilloma  Negative for malignancy  Extensive previous procedure related changes present  (specimen has been submitted in it's entirety)  2. NEW ANTERIOR MARGIN, EXCISION:  Benign skin and subcutaneous tissue with extensive inflammation and procedure related changes  Negative for neoplasia or malignancy  (specimen has been submitted in its entirety)  Part1.  Immunostain E-Cadherin has been performed (with appropriate controls) on two sections #1C AND 1G and is  negative, supporting the above diagnosis.  Hormone receptors performed for LCIS (Part 1)  ER - Positive (90% of the tumor  nuclei, moderate to strong)  MS - Positive (30% of the tumor nuclei, weak)  HER2 - Indeterminate (2+), specimen will be sent out for FISH analysis  Ki67 - 5%, positive tumor nuclei  RSD2UTH,ER,PGR            Bone Density  1/18/2018   Osteopenia.  Ten year probability of major osteoporotic fracture is 3.9%, and hip fracture is 0.2%.    Assessment:       1. Ductal carcinoma in situ (DCIS) of left breast    2. History of radiation therapy lt breast completed 1/25/2019    3. Osteopenia, unspecified location        Plan:       1-3  Patient is a 48 y.o. female with Stage 0 pTis N0 M0 grade 2 ER + MS + DCIS of the left breast status post left partial mastectomy with focally positive medial margin s/p re-excison on 11/16/18. Final pathology showed LCIS but no residual DCIS with negative with closest margins are medial and inferior - 1mm   S/p  adjuvant XRT  under the direction of Rad/Onc at [facility] completed 1/25/2019     Recent bone density reveals osteopenia  Begin Ca and Vit D   Consider bisphosphonate therapy   We discussed lifestyle and weight-bearing exercises and alcohol in moderation recommended. We will continue to monitor her bone health as well.      Rx provided for Arimidex 1 mg po qd  Pt previously provided with handout on planned therapy     [facility], negative Integrated BRACAnalysis with Mahendra     It has been recommended that she undergo a bilateral mammogram alternating with MRI surveillance moving forward.    She will continue with monthly self-breast exams    She will follow-up in 1 month to assess tolerability of prescribed therapy   The patient is advised on  continued exam of the breast  and to report to this office sooner should she note any areas of abnormality or concern.     Cc: MD Maryjo Pagan MD

## 2019-03-17 PROBLEM — Z90.12 HISTORY OF PARTIAL MASTECTOMY OF LEFT BREAST: Status: ACTIVE | Noted: 2019-03-17

## 2019-03-17 PROBLEM — Z92.3 HISTORY OF RADIATION THERAPY: Status: ACTIVE | Noted: 2019-03-17

## 2019-04-08 ENCOUNTER — LAB VISIT (OUTPATIENT)
Dept: LAB | Facility: HOSPITAL | Age: 49
End: 2019-04-08
Attending: INTERNAL MEDICINE
Payer: MEDICAID

## 2019-04-08 DIAGNOSIS — D05.12 DUCTAL CARCINOMA IN SITU (DCIS) OF LEFT BREAST: ICD-10-CM

## 2019-04-08 DIAGNOSIS — M85.80 OSTEOPENIA, UNSPECIFIED LOCATION: ICD-10-CM

## 2019-04-08 LAB
25(OH)D3+25(OH)D2 SERPL-MCNC: 45 NG/ML (ref 30–96)
ALBUMIN SERPL BCP-MCNC: 4.2 G/DL (ref 3.5–5.2)
ALP SERPL-CCNC: 116 U/L (ref 55–135)
ALT SERPL W/O P-5'-P-CCNC: 33 U/L (ref 10–44)
ANION GAP SERPL CALC-SCNC: 7 MMOL/L (ref 8–16)
AST SERPL-CCNC: 26 U/L (ref 10–40)
BASOPHILS # BLD AUTO: 0 K/UL (ref 0–0.2)
BASOPHILS NFR BLD: 0 % (ref 0–1.9)
BILIRUB SERPL-MCNC: 0.4 MG/DL (ref 0.1–1)
BUN SERPL-MCNC: 13 MG/DL (ref 6–20)
CALCIUM SERPL-MCNC: 10 MG/DL (ref 8.7–10.5)
CHLORIDE SERPL-SCNC: 108 MMOL/L (ref 95–110)
CO2 SERPL-SCNC: 29 MMOL/L (ref 23–29)
CREAT SERPL-MCNC: 0.8 MG/DL (ref 0.5–1.4)
DIFFERENTIAL METHOD: NORMAL
EOSINOPHIL # BLD AUTO: 0.3 K/UL (ref 0–0.5)
EOSINOPHIL NFR BLD: 4.1 % (ref 0–8)
ERYTHROCYTE [DISTWIDTH] IN BLOOD BY AUTOMATED COUNT: 13.6 % (ref 11.5–14.5)
EST. GFR  (AFRICAN AMERICAN): >60 ML/MIN/1.73 M^2
EST. GFR  (NON AFRICAN AMERICAN): >60 ML/MIN/1.73 M^2
GLUCOSE SERPL-MCNC: 93 MG/DL (ref 70–110)
HCT VFR BLD AUTO: 41.8 % (ref 37–48.5)
HGB BLD-MCNC: 13.7 G/DL (ref 12–16)
LYMPHOCYTES # BLD AUTO: 1.4 K/UL (ref 1–4.8)
LYMPHOCYTES NFR BLD: 23.7 % (ref 18–48)
MCH RBC QN AUTO: 30 PG (ref 27–31)
MCHC RBC AUTO-ENTMCNC: 32.8 G/DL (ref 32–36)
MCV RBC AUTO: 92 FL (ref 82–98)
MONOCYTES # BLD AUTO: 0.4 K/UL (ref 0.3–1)
MONOCYTES NFR BLD: 6.6 % (ref 4–15)
NEUTROPHILS # BLD AUTO: 4 K/UL (ref 1.8–7.7)
NEUTROPHILS NFR BLD: 65.6 % (ref 38–73)
PLATELET # BLD AUTO: 249 K/UL (ref 150–350)
PMV BLD AUTO: 11.2 FL (ref 9.2–12.9)
POTASSIUM SERPL-SCNC: 3.9 MMOL/L (ref 3.5–5.1)
PROT SERPL-MCNC: 7.2 G/DL (ref 6–8.4)
RBC # BLD AUTO: 4.57 M/UL (ref 4–5.4)
SODIUM SERPL-SCNC: 144 MMOL/L (ref 136–145)
WBC # BLD AUTO: 6.04 K/UL (ref 3.9–12.7)

## 2019-04-08 PROCEDURE — 82306 VITAMIN D 25 HYDROXY: CPT

## 2019-04-08 PROCEDURE — 80053 COMPREHEN METABOLIC PANEL: CPT

## 2019-04-08 PROCEDURE — 36415 COLL VENOUS BLD VENIPUNCTURE: CPT

## 2019-04-08 PROCEDURE — 85025 COMPLETE CBC W/AUTO DIFF WBC: CPT

## 2019-04-11 ENCOUNTER — OFFICE VISIT (OUTPATIENT)
Dept: HEMATOLOGY/ONCOLOGY | Facility: CLINIC | Age: 49
End: 2019-04-11
Payer: MEDICAID

## 2019-04-11 VITALS
SYSTOLIC BLOOD PRESSURE: 118 MMHG | HEART RATE: 71 BPM | OXYGEN SATURATION: 99 % | WEIGHT: 140 LBS | HEIGHT: 59 IN | DIASTOLIC BLOOD PRESSURE: 78 MMHG | BODY MASS INDEX: 28.22 KG/M2 | TEMPERATURE: 98 F

## 2019-04-11 DIAGNOSIS — T45.1X5A HOT FLASHES RELATED TO AROMATASE INHIBITOR THERAPY: ICD-10-CM

## 2019-04-11 DIAGNOSIS — Z79.811 ENCOUNTER FOR MONITORING AROMATASE INHIBITOR THERAPY: ICD-10-CM

## 2019-04-11 DIAGNOSIS — D05.12 DUCTAL CARCINOMA IN SITU (DCIS) OF LEFT BREAST: Primary | ICD-10-CM

## 2019-04-11 DIAGNOSIS — R23.2 HOT FLASHES RELATED TO AROMATASE INHIBITOR THERAPY: ICD-10-CM

## 2019-04-11 DIAGNOSIS — Z71.89 ADVANCED CARE PLANNING/COUNSELING DISCUSSION: ICD-10-CM

## 2019-04-11 DIAGNOSIS — Z51.81 ENCOUNTER FOR MONITORING AROMATASE INHIBITOR THERAPY: ICD-10-CM

## 2019-04-11 PROCEDURE — 99214 OFFICE O/P EST MOD 30 MIN: CPT | Mod: PBBFAC,25 | Performed by: INTERNAL MEDICINE

## 2019-04-11 PROCEDURE — 99497 PR ADVNCD CARE PLAN 30 MIN: ICD-10-PCS | Mod: S$PBB,,, | Performed by: INTERNAL MEDICINE

## 2019-04-11 PROCEDURE — 99214 PR OFFICE/OUTPT VISIT, EST, LEVL IV, 30-39 MIN: ICD-10-PCS | Mod: S$PBB,,, | Performed by: INTERNAL MEDICINE

## 2019-04-11 PROCEDURE — 99999 PR PBB SHADOW E&M-EST. PATIENT-LVL IV: CPT | Mod: PBBFAC,,, | Performed by: INTERNAL MEDICINE

## 2019-04-11 PROCEDURE — 99999 PR PBB SHADOW E&M-EST. PATIENT-LVL IV: ICD-10-PCS | Mod: PBBFAC,,, | Performed by: INTERNAL MEDICINE

## 2019-04-11 PROCEDURE — 99497 ADVNCD CARE PLAN 30 MIN: CPT | Mod: PBBFAC | Performed by: INTERNAL MEDICINE

## 2019-04-11 PROCEDURE — 99214 OFFICE O/P EST MOD 30 MIN: CPT | Mod: S$PBB,,, | Performed by: INTERNAL MEDICINE

## 2019-04-11 PROCEDURE — 99497 ADVNCD CARE PLAN 30 MIN: CPT | Mod: S$PBB,,, | Performed by: INTERNAL MEDICINE

## 2019-04-11 RX ORDER — OMEPRAZOLE 20 MG/1
CAPSULE, DELAYED RELEASE ORAL
Refills: 4 | COMMUNITY
Start: 2019-04-04 | End: 2019-05-03 | Stop reason: SDUPTHER

## 2019-04-11 RX ORDER — MULTIVIT WITH MINERALS/HERBS
1 TABLET ORAL
COMMUNITY
End: 2020-03-13

## 2019-04-11 RX ORDER — CLONIDINE HYDROCHLORIDE 0.1 MG/1
0.1 TABLET ORAL 2 TIMES DAILY PRN
Qty: 60 TABLET | Refills: 11 | Status: SHIPPED | OUTPATIENT
Start: 2019-04-11 | End: 2020-05-25

## 2019-04-11 NOTE — PROGRESS NOTES
Subjective:       Patient ID: Lou Jc is a 48 y.o. female.    Chief Complaint: Follow-up    HPI       Diagnosis: Stage 0  pTis N0 M0 grade 2 ER + [place] + DCIS of the lt breast s/p lt partial mastectomy 9/21/18. with positive margin s/p re-excison on 11/16/18  S/p adjuvant XRT left breast under direction of Dr. Vázquez at [facility] on 1/25/2019           HPI: Pt is a 48 y.o. female seen today for f/u for   Left breast cancer. She was initially seen at [facility] ([facility] in [place], [place]). She had a BI-RADS 0 bilateral screening mammogram for architectural distortion and subsequent bilateral diagnostic mammogram and left breast ultrasound that were BI-RADS 4 and revealed focal asymmetry at the outer central position left breast She subsequently underwent ultrasound-guided core needle biopsy on 8/17/18 with pathology showing fragmented papilloma with no evidence of atypia or in-situ or invasive carcinoma. She was then seen by General Surgery at [facility] on 8/31/18 to discuss wire localization excisional biopsy of this intraductal papilloma, which ultimately occurred on 9/21/18. Final pathology from the excision biopsy upstaged to a 2.0 cm grade 2 ER+ (moderate) [place]+ (moderate) ductal carcinoma in situ with focally positive medial margin with no evidence of invasive carcinoma. She subsequently underwent re-excison on 11/16/18. Final pathology showed LCIS but no residual DCIS. Margins uninvolved with closest margins are medial and inferior - 1mm, superior 6mm. She routinely performs self breast exams. She has not noted a change on breast exam. Patient denies nipple discharge. She reports she has not undergone annual routine screening mammograms. She reports history of previous breast bx in 2015- pathologically benign. She underwent [facility] testing with was negative .         She is followed by Rad/Onc  She has completed adjuvant XRT left breast to a dose of 5005cGy under direction of Dr. Vázquez at [facility] on  1/25/2019          She is accompanied by her fiance     Today, she is doing well except for mild hot flashes  No new issues  No SOB/CP  Appetite and weight stable  No arthralgias  She continues with breast tenderness             Risk Factors/Breast History Age of Menarche: 10y.o. Age of Menopause: 48. LMP 2016. History of Hormonal Therapy: OCPs x 4 years. No HRT.Nulliparous Family History of Breast Ca: Maternal aunt (age 60s). Maternal aunt (age 70s). Paternal aunt (age 70s).   No Family hx of Ovarian Cancer. Other Family History: Father with pancreatic cancer.         Social History .She is a chronic smoker. She has smoked 1ppd x 25 yrs. She drinks 2 beers/week.            Past Medical History:   Diagnosis Date    Allergy     Breast cyst     DCIS (ductal carcinoma in situ)     Left    Endometriosis     Papilloma of breast     Reflux          Past Surgical History:   Procedure Laterality Date    BREAST BIOPSY Left     papilloma    BREAST CYST ASPIRATION Left     BREAST LUMPECTOMY      2 lumpectomy    diagnostic lap      LUMPECTOMY, BREAST-re-ecxision Left 11/16/2018    Performed by Prabha Matias MD at [facility] OR 2ND FLR    LUMPECTOMY,BREAST Left 9/21/2018    Performed by Prabha [facility] at [facility] OR    TONSILLECTOMY         Review of Systems   Constitutional: Negative for appetite change, fatigue, fever and unexpected weight change.   HENT: Negative for mouth sores.    Eyes: Negative for visual disturbance.   Respiratory: Negative for cough and shortness of breath.    Cardiovascular: Negative for chest pain.   Gastrointestinal: Negative for abdominal pain and diarrhea.   Genitourinary: Negative for frequency.   Musculoskeletal: Negative for back pain.   Skin: Negative for rash.   Neurological: Negative for headaches.   Hematological: Negative for adenopathy.   Psychiatric/Behavioral: The patient is nervous/anxious.        Objective:        Vitals:    04/11/19 0808 04/11/19 0812   BP: (!) 159/84 118/78  "  BP Location: Right arm Right arm   Patient Position: Sitting Sitting   BP Method: Medium (Automatic) Medium (Manual)   Pulse: 71    Temp: 98.3 °F (36.8 °C)    TempSrc: Oral    SpO2: 99%    Weight: 63.5 kg (139 lb 15.9 oz)    Height: 4' 11" (1.499 m)        Physical Exam   Constitutional: She is oriented to person, place, and time. She appears well-developed and well-nourished.   HENT:   Head: Normocephalic.   Eyes: Conjunctivae and lids are normal. No scleral icterus.   Neck: Normal range of motion. Neck supple. No thyromegaly present.   Cardiovascular: Normal rate, regular rhythm and normal heart sounds.   No murmur heard.  Pulmonary/Chest: Breath sounds normal. She has no wheezes. She has no rales. Right breast exhibits tenderness. Right breast exhibits no mass, no nipple discharge and no skin change. Left breast exhibits tenderness. Left breast exhibits no mass, no nipple discharge and no skin change.   Lt breast- minor skin changes noted at radiation site       Abdominal: Soft. Bowel sounds are normal. She exhibits no distension and no mass. There is no hepatosplenomegaly. There is no tenderness. There is no rebound and no guarding.   Musculoskeletal: Normal range of motion. She exhibits no edema or tenderness.   Lymphadenopathy:     She has no cervical adenopathy.     She has no axillary adenopathy.        Right: No supraclavicular adenopathy present.        Left: No supraclavicular adenopathy present.   Neurological: She is alert and oriented to person, place, and time. No cranial nerve deficit. Coordination normal.   Skin: Skin is warm and dry. No ecchymosis, no petechiae and no rash noted. No erythema.   Psychiatric: She has a normal mood and affect.         Lab Results   Component Value Date    WBC 6.04 04/08/2019    HGB 13.7 04/08/2019    HCT 41.8 04/08/2019    MCV 92 04/08/2019     04/08/2019     Results for STEFFANIE JIMENEZ (MRN [id number]) as of 1/10/2019 09:20   Ref. Range 12/6/2018 10:27 "   Estradiol Latest Ref Range: See Text pg/mL <10 (A)   Testosterone, Free Latest Units: pg/mL 1.8     FINAL PATHOLOGIC DIAGNOSIS 9/21/2018   Left breast, wire localized lumpectomy:  -Ductal carcinoma in situ (DCIS), Grade 2 (intermediate), focally involving the medial margin, present in 5  blocks with an estimated size of at least 20 mm (see cancer case summary below)  -Background breast tissue with fibrocystic change including dense fibrosis, papillomatosis, adenosis, cystic  dilatation, apocrine metaplasia, and fibroadenomatoid nodules  -Biopsy site changes  -No evidence of invasive carcinoma  Surgical pathology cancer case summary:  -Procedure: Excision (less than total mastectomy)  -Specimen laterality: Left  -Size (extent) of DCIS: Estimated size of DCIS: At least 20 mm: Number blocks with DCIS: 5  -Histologic type: Ductal carcinoma in situ  -Architectural patterns: Solid  -Nuclear grade: Grade 2 (intermediate)  -Necrosis: Not identified  -Margins: Medial margin focally positive for DCIS  -Regional lymph nodes: No lymph nodes submitted or found  -Pathologic stage classification (pTNM):  Primary tumor (pT): pTis (DCIS): Ductal carcinoma in situ  Regional lymph nodes (pN): pNX: Regional lymph nodes cannot be assessed  -Microcalcifications: Present in DCIS and nonneoplastic tissue     Hormone receptor results (performed with appropriate controls):  ER - Positive (moderate)  UT - Positive (moderate)           FINAL PATHOLOGIC DIAGNOSIS  11/16/2018    1. LEFT BREAST, RE-EXCISION:  No residual ductal carcinoma in-situ  Lobular carcinoma in-situ  Margins negative for neoplasia or malignancy (closest margins are medial and inferior - 1mm, superior 6mm)  Atypical lobular hyperplasia  Intraductal papilloma  Negative for malignancy  Extensive previous procedure related changes present  (specimen has been submitted in it's entirety)  2. NEW ANTERIOR MARGIN, EXCISION:  Benign skin and subcutaneous tissue with extensive  inflammation and procedure related changes  Negative for neoplasia or malignancy  (specimen has been submitted in its entirety)  Part1.  Immunostain E-Cadherin has been performed (with appropriate controls) on two sections #1C AND 1G and is  negative, supporting the above diagnosis.  Hormone receptors performed for LCIS (Part 1)  ER - Positive (90% of the tumor nuclei, moderate to strong)  [place] - Positive (30% of the tumor nuclei, weak)  HER2 - Indeterminate (2+), specimen will be sent out for FISH analysis  Ki67 - 5%, positive tumor nuclei  [id number]            Bone Density  1/18/2018   Osteopenia.  Ten year probability of major osteoporotic fracture is 3.9%, and hip fracture is 0.2%.    Assessment:       1. Ductal carcinoma in situ (DCIS) of left breast    2. Encounter for monitoring aromatase inhibitor therapy    3. Hot flashes related to aromatase inhibitor therapy    4. Advanced care planning/counseling discussion        Plan:       1-4  Patient is a 48 y.o. female with Stage 0 pTis N0 M0 grade 2 ER + [place] + DCIS of the left breast status post left partial mastectomy with focally positive medial margin s/p re-excison on 11/16/18. Final pathology showed LCIS but no residual DCIS with negative with closest margins are medial and inferior - 1mm   S/p  adjuvant XRT  under the direction of Rad/Onc at [facility] completed 1/25/2019     [facility], negative [facility] with [facility]       Bone density  1/10/2019 reveals osteopenia  Cont Ca and Vit D   Consider bisphosphonate therapy   Plan bone density every 1  To 2 years   Continue weight bearing exercises  Avoidance of smoking        Continue monthly SBE  Plan  bilateral mammogram alternating with MRI surveillance moving forward.    She will continue with monthly self-breast exams  Plan Follow-up Mammo April 27th 2019  Plan MRI in 6 months  We discussed lifestyle and weight-bearing exercises and alcohol in moderation recommended.   We will continue to  monitor her bone health as well.  She will follow-up every 3 months for years 1-3     Cont Arimidex 1 mg po qd  Plan 5 yrs of Aromatase inhibitor   Rx provided for Clonidine 0.1mg po bid prn hot flashes     [facility], negative [facility] with [facility]     It has been recommended that she undergo a bilateral mammogram alternating with MRI surveillance moving forward.    She will continue with monthly self-breast exams    She will follow-up in 1 month to assess tolerability of prescribed therapy   The patient is advised on  continued exam of the breast  and to report to this office sooner should she note any areas of abnormality or concern.   .    Mammo planned 27th April   MRI 6 mos following     Advance Care Planning     Power of   I initiated the process of advance care planning today and explained the importance of this process to the patient.  I introduced the concept of advance directives to the patient, as well. Then the patient received detailed information about the importance of designating a Health Care Power of  (HCPOA). She was also instructed to communicate with this person about their wishes for future healthcare, should she become sick and lose decision-making capacity. The patient has not previously appointed a HCPOA. After our discussion, the patient has decided to complete a HCPOA and has appointed her brother  Xiang Trinidad [phone] and NAME:  Leon Lay [phone]  I spent a total time of 16  minutes discussing this issue with the patient.            Cc: MD Maryjo Pagan MD

## 2019-04-22 NOTE — PROGRESS NOTES
REFERRING PHYSICIAN:  Parth Wolfe MD    MEDICAL ONCOLOGIST:     Dr. Najera  RADIATION ONCOLOGIST:    Dr. Avalos at [facility]    DIAGNOSIS:    This is a 48 y.o. female with a stage pTis N0 M0 grade 2 ER + [place] + DCIS of the left breast.    TREATMENT SUMMARY:  The patient is status post left partial mastectomy performed at [facility] with focally positive medial margin and now re-excison on 11/16/18.  Final pathology showed LCIS but no residual DCIS.  Radiation Oncology: Dr. Avalos  Medical Oncology: Dr. Najera    INTERVAL HISTORY:   Lou Jc comes in for a follow up.  She denies fever, chills, chest pain or shortness of breath.  Had pain related likely to radiation; seen by Dr. Davey on 1/11/19 (was on third week of radiation treatment). Was exhibiting some skin changes, had minor breast firmness and tenderness, mild skin darkening. Ribs and intercostal muscles were exquisitely tender. Adjuvant XRT now complete (Dr. Avalos). Still states that she has post radiation pain along ribs (on both sides and along sternum). Does not take pain relievers, has not tried heat/cold to help with pain. Continues to follow with Med Onc (Dr. Najera); on Arimidex 1 mg po qd, Aromatase inhibitor (x 5 years), Clonidine 0.1mg po bid prn hot flashes.    MEDICATIONS:  Current Outpatient Medications   Medication Sig Dispense Refill    alum, ammonium (ALUM) powder Apply topically 2 (two) times daily as needed. USE as directed  g 1    ascorbic acid, vitamin C, (VITAMIN C) 100 MG tablet Take 100 mg by mouth once daily.      aspirin-acetaminophen-caffeine 250-250-65 mg (EXCEDRIN MIGRAINE) 250-250-65 mg per tablet Take 1 tablet by mouth every 6 (six) hours as needed for Pain.  0    b complex vitamins tablet Take 1 tablet by mouth.      clonazePAM (KLONOPIN) 1 MG tablet Take 1 tablet (1 mg total) by mouth every evening. 30 tablet 1    cloNIDine (CATAPRES) 0.1 MG tablet Take 1 tablet (0.1 mg total) by mouth 2 (two) times  "daily as needed (hot flashes). 60 tablet 11    diclofenac sodium (VOLTAREN) 1 % Gel Apply 2 g topically 4 (four) times daily. 100 g 0    ergocalciferol (ERGOCALCIFEROL) 50,000 unit Cap Take 1 capsule (50,000 Units total) by mouth every 7 days. 12 capsule 3    estradiol (IMVEXXY MAINTENANCE PACK) 4 mcg Inst Place 4 mcg vaginally twice a week. 8 each 6    HYDROcodone-acetaminophen (NORCO) 5-325 mg per tablet Take 1 tablet by mouth every 4 (four) hours as needed for Pain. 15 tablet 0    ketoconazole (NIZORAL) 2 % cream Apply topically once daily. 30 g 0    loratadine (CLARITIN) 10 mg tablet Take 1 tablet (10 mg total) by mouth once daily. 30 tablet 3    naproxen (NAPROSYN) 500 MG tablet Take 1 tablet (500 mg total) by mouth 2 (two) times daily with meals. 90 tablet 1    omeprazole (PRILOSEC) 20 MG capsule   4     No current facility-administered medications for this visit.        ALLERGIES:   Review of patient's allergies indicates:  No Known Allergies    PHYSICAL EXAMINATION:   BP (!) 142/81 (BP Location: Right arm, Patient Position: Sitting, BP Method: Medium (Automatic))   Pulse 76   Temp 96.9 °F (36.1 °C) (Oral)   Ht 4' 11" (1.499 m)   Wt 61.2 kg (135 lb)   LMP 06/27/2016   BMI 27.27 kg/m²   Physical Exam   Constitutional: She appears well-developed and well-nourished.   HENT:   Head: Normocephalic.   Eyes: No scleral icterus.   Neck: Neck supple. No tracheal deviation present.   Cardiovascular: Normal rate and regular rhythm.    Pulmonary/Chest: Breath sounds normal. No respiratory distress. Right breast exhibits no inverted nipple, no mass, no nipple discharge and no skin change. Left breast exhibits no inverted nipple, no mass, no nipple discharge and no skin change.       Abdominal: Soft. She exhibits no mass. There is no tenderness.   Musculoskeletal: She exhibits no edema.   Lymphadenopathy:     She has no cervical adenopathy.   Neurological: She is alert.   Skin: No rash noted. No erythema. "     Psychiatric: She has a normal mood and affect.       Mammogram/Ultrasound (4/23/19):  Findings:  This procedure was performed using tomosynthesis.  Computer-aided detection was utilized in the interpretation of this examination.  The breasts are extremely dense, which lowers the sensitivity of mammography.     In the left breast upper outer quadrant posterior depth, there are milk of calcium type calcifications, unchanged since 2016 and benign.      Expected benign post treatment changes in the left breast, including lumpectomy scar. No suspicious mammographic finding in the left breast.      In the right breast superior region middle depth, there is an oval asymmetry visible in the lateral projections. This localizes to the right breast upper outer quadrant on the 3D tomosynthesis images.      Limited right breast ultrasound:   In the right breast upper outer quadrant 10:00 o'clock axis 7 cm from the nipple, there is an oval complicated cyst measuring 0.6 cm. This corresponds to the right breast mammographic finding, benign.      No suspicious finding. No right axillary adenopathy.      Impression:  Bilateral  There is no mammographic or sonographic evidence of malignancy.     BI-RADS Category:   Overall: 2 - Benign  Recommendation:  Routine screening mammogram in 1 year is recommended.       IMPRESSION:   The patient has had an uneventful postoperative course.    PLAN:   1. return in 6 months for a follow up office visit and breast exam  2. bilateral MRI to be completed in 6 months- Needs alternating MMG and MRI surveillance moving forward as considered high risk. MRI due in 6 mo.  3. The patient is advised in continued exam of the breast chest wall and to report to this office sooner should she note any areas of abnormality or concern.   4.  She is followed by med onc (Dr. Najera) and has completed radiation treatment with  rad onc (Dr. Avalos)

## 2019-04-23 ENCOUNTER — OFFICE VISIT (OUTPATIENT)
Dept: SURGERY | Facility: CLINIC | Age: 49
End: 2019-04-23
Payer: MEDICAID

## 2019-04-23 ENCOUNTER — HOSPITAL ENCOUNTER (OUTPATIENT)
Dept: RADIOLOGY | Facility: HOSPITAL | Age: 49
Discharge: HOME | End: 2019-04-23
Attending: SURGERY
Payer: MEDICAID

## 2019-04-23 VITALS
BODY MASS INDEX: 27.21 KG/M2 | SYSTOLIC BLOOD PRESSURE: 142 MMHG | TEMPERATURE: 97 F | WEIGHT: 135 LBS | HEIGHT: 59 IN | DIASTOLIC BLOOD PRESSURE: 81 MMHG | HEART RATE: 76 BPM

## 2019-04-23 DIAGNOSIS — D05.12 DUCTAL CARCINOMA IN SITU (DCIS) OF LEFT BREAST: ICD-10-CM

## 2019-04-23 DIAGNOSIS — D05.12 DUCTAL CARCINOMA IN SITU (DCIS) OF LEFT BREAST: Primary | ICD-10-CM

## 2019-04-23 PROCEDURE — 99999 PR PBB SHADOW E&M-EST. PATIENT-LVL III: CPT | Mod: PBBFAC,,, | Performed by: SURGERY

## 2019-04-23 PROCEDURE — 77066 MAMMO DIGITAL DIAGNOSTIC BILAT WITH TOMOSYNTHESIS_CAD: ICD-10-PCS | Mod: 26,,, | Performed by: RADIOLOGY

## 2019-04-23 PROCEDURE — 76642 US BREAST RIGHT LIMITED: ICD-10-PCS | Mod: 26,RT,, | Performed by: RADIOLOGY

## 2019-04-23 PROCEDURE — 76642 ULTRASOUND BREAST LIMITED: CPT | Mod: 26,RT,, | Performed by: RADIOLOGY

## 2019-04-23 PROCEDURE — 99213 OFFICE O/P EST LOW 20 MIN: CPT | Mod: PBBFAC,25,PO | Performed by: SURGERY

## 2019-04-23 PROCEDURE — 99999 PR PBB SHADOW E&M-EST. PATIENT-LVL III: ICD-10-PCS | Mod: PBBFAC,,, | Performed by: SURGERY

## 2019-04-23 PROCEDURE — 77066 DX MAMMO INCL CAD BI: CPT | Mod: 26,,, | Performed by: RADIOLOGY

## 2019-04-23 PROCEDURE — 77062 MAMMO DIGITAL DIAGNOSTIC BILAT WITH TOMOSYNTHESIS_CAD: ICD-10-PCS | Mod: 26,,, | Performed by: RADIOLOGY

## 2019-04-23 PROCEDURE — 99213 PR OFFICE/OUTPT VISIT, EST, LEVL III, 20-29 MIN: ICD-10-PCS | Mod: S$PBB,,, | Performed by: SURGERY

## 2019-04-23 PROCEDURE — 77062 BREAST TOMOSYNTHESIS BI: CPT | Mod: TC,PO

## 2019-04-23 PROCEDURE — 76642 ULTRASOUND BREAST LIMITED: CPT | Mod: TC,PO,RT

## 2019-04-23 PROCEDURE — 77062 BREAST TOMOSYNTHESIS BI: CPT | Mod: 26,,, | Performed by: RADIOLOGY

## 2019-04-23 PROCEDURE — 99213 OFFICE O/P EST LOW 20 MIN: CPT | Mod: S$PBB,,, | Performed by: SURGERY

## 2019-05-07 DIAGNOSIS — Z91.89 AT HIGH RISK FOR BREAST CANCER: ICD-10-CM

## 2019-05-07 DIAGNOSIS — Z86.000 HISTORY OF DUCTAL CARCINOMA IN SITU (DCIS) OF BREAST: Primary | ICD-10-CM

## 2019-06-05 DIAGNOSIS — D05.12 DUCTAL CARCINOMA IN SITU (DCIS) OF LEFT BREAST: Primary | ICD-10-CM

## 2019-06-05 RX ORDER — ANASTROZOLE 1 MG/1
1 TABLET ORAL DAILY
Refills: 2 | COMMUNITY
Start: 2019-05-03 | End: 2019-06-05 | Stop reason: SDUPTHER

## 2019-06-05 RX ORDER — ANASTROZOLE 1 MG/1
1 TABLET ORAL DAILY
Qty: 30 TABLET | Refills: 2 | Status: SHIPPED | OUTPATIENT
Start: 2019-06-05 | End: 2019-06-07 | Stop reason: SDUPTHER

## 2019-06-05 NOTE — TELEPHONE ENCOUNTER
----- Message from Trinity Barnard, Patient Care Assistant sent at 6/5/2019  8:36 AM CDT -----  Contact: patient  Request refill on Anastrozole 1mg.  Pt has an appt.on 06/13/19 will run out of medication before appt.    Pharmacy: [facility]

## 2019-06-07 DIAGNOSIS — D05.12 DUCTAL CARCINOMA IN SITU (DCIS) OF LEFT BREAST: ICD-10-CM

## 2019-06-07 RX ORDER — ANASTROZOLE 1 MG/1
1 TABLET ORAL DAILY
Qty: 30 TABLET | Refills: 2 | Status: SHIPPED | OUTPATIENT
Start: 2019-06-07 | End: 2019-08-20 | Stop reason: SDUPTHER

## 2019-06-10 ENCOUNTER — LAB VISIT (OUTPATIENT)
Dept: LAB | Facility: HOSPITAL | Age: 49
End: 2019-06-10
Attending: INTERNAL MEDICINE
Payer: MEDICAID

## 2019-06-10 ENCOUNTER — TELEPHONE (OUTPATIENT)
Dept: HEMATOLOGY/ONCOLOGY | Facility: CLINIC | Age: 49
End: 2019-06-10

## 2019-06-10 DIAGNOSIS — D05.12 DUCTAL CARCINOMA IN SITU (DCIS) OF LEFT BREAST: Primary | ICD-10-CM

## 2019-06-10 DIAGNOSIS — D05.12 DUCTAL CARCINOMA IN SITU (DCIS) OF LEFT BREAST: ICD-10-CM

## 2019-06-10 LAB
25(OH)D3+25(OH)D2 SERPL-MCNC: 52 NG/ML (ref 30–96)
ALBUMIN SERPL BCP-MCNC: 4.5 G/DL (ref 3.5–5.2)
ALP SERPL-CCNC: 117 U/L (ref 55–135)
ALT SERPL W/O P-5'-P-CCNC: 26 U/L (ref 10–44)
ANION GAP SERPL CALC-SCNC: 7 MMOL/L (ref 8–16)
AST SERPL-CCNC: 20 U/L (ref 10–40)
BASOPHILS # BLD AUTO: 0.02 K/UL (ref 0–0.2)
BASOPHILS NFR BLD: 0.3 % (ref 0–1.9)
BILIRUB SERPL-MCNC: 0.4 MG/DL (ref 0.1–1)
BUN SERPL-MCNC: 17 MG/DL (ref 6–20)
CALCIUM SERPL-MCNC: 10.1 MG/DL (ref 8.7–10.5)
CHLORIDE SERPL-SCNC: 106 MMOL/L (ref 95–110)
CO2 SERPL-SCNC: 28 MMOL/L (ref 23–29)
CREAT SERPL-MCNC: 0.8 MG/DL (ref 0.5–1.4)
DIFFERENTIAL METHOD: NORMAL
EOSINOPHIL # BLD AUTO: 0.3 K/UL (ref 0–0.5)
EOSINOPHIL NFR BLD: 4.5 % (ref 0–8)
ERYTHROCYTE [DISTWIDTH] IN BLOOD BY AUTOMATED COUNT: 13.9 % (ref 11.5–14.5)
EST. GFR  (AFRICAN AMERICAN): >60 ML/MIN/1.73 M^2
EST. GFR  (NON AFRICAN AMERICAN): >60 ML/MIN/1.73 M^2
GLUCOSE SERPL-MCNC: 97 MG/DL (ref 70–110)
HCT VFR BLD AUTO: 42.5 % (ref 37–48.5)
HGB BLD-MCNC: 13.8 G/DL (ref 12–16)
LYMPHOCYTES # BLD AUTO: 1.6 K/UL (ref 1–4.8)
LYMPHOCYTES NFR BLD: 25.3 % (ref 18–48)
MCH RBC QN AUTO: 29.9 PG (ref 27–31)
MCHC RBC AUTO-ENTMCNC: 32.5 G/DL (ref 32–36)
MCV RBC AUTO: 92 FL (ref 82–98)
MONOCYTES # BLD AUTO: 0.4 K/UL (ref 0.3–1)
MONOCYTES NFR BLD: 5.4 % (ref 4–15)
NEUTROPHILS # BLD AUTO: 4.2 K/UL (ref 1.8–7.7)
NEUTROPHILS NFR BLD: 64.5 % (ref 38–73)
PLATELET # BLD AUTO: 252 K/UL (ref 150–350)
PMV BLD AUTO: 11 FL (ref 9.2–12.9)
POTASSIUM SERPL-SCNC: 4.1 MMOL/L (ref 3.5–5.1)
PROT SERPL-MCNC: 7.4 G/DL (ref 6–8.4)
RBC # BLD AUTO: 4.61 M/UL (ref 4–5.4)
SODIUM SERPL-SCNC: 141 MMOL/L (ref 136–145)
WBC # BLD AUTO: 6.49 K/UL (ref 3.9–12.7)

## 2019-06-10 PROCEDURE — 85025 COMPLETE CBC W/AUTO DIFF WBC: CPT

## 2019-06-10 PROCEDURE — 80053 COMPREHEN METABOLIC PANEL: CPT

## 2019-06-10 PROCEDURE — 36415 COLL VENOUS BLD VENIPUNCTURE: CPT

## 2019-06-10 PROCEDURE — 82306 VITAMIN D 25 HYDROXY: CPT

## 2019-06-13 ENCOUNTER — OFFICE VISIT (OUTPATIENT)
Dept: HEMATOLOGY/ONCOLOGY | Facility: CLINIC | Age: 49
End: 2019-06-13
Payer: MEDICAID

## 2019-06-13 VITALS
TEMPERATURE: 98 F | DIASTOLIC BLOOD PRESSURE: 90 MMHG | OXYGEN SATURATION: 98 % | SYSTOLIC BLOOD PRESSURE: 132 MMHG | WEIGHT: 137.13 LBS | HEIGHT: 59 IN | BODY MASS INDEX: 27.64 KG/M2 | HEART RATE: 62 BPM

## 2019-06-13 DIAGNOSIS — Z90.12 HISTORY OF PARTIAL MASTECTOMY OF LEFT BREAST: ICD-10-CM

## 2019-06-13 DIAGNOSIS — Z51.81 ENCOUNTER FOR MONITORING AROMATASE INHIBITOR THERAPY: ICD-10-CM

## 2019-06-13 DIAGNOSIS — Z79.811 ENCOUNTER FOR MONITORING AROMATASE INHIBITOR THERAPY: ICD-10-CM

## 2019-06-13 DIAGNOSIS — D05.12 DUCTAL CARCINOMA IN SITU (DCIS) OF LEFT BREAST: Primary | ICD-10-CM

## 2019-06-13 PROCEDURE — 99214 OFFICE O/P EST MOD 30 MIN: CPT | Mod: PBBFAC | Performed by: INTERNAL MEDICINE

## 2019-06-13 PROCEDURE — 99214 OFFICE O/P EST MOD 30 MIN: CPT | Mod: S$PBB,,, | Performed by: INTERNAL MEDICINE

## 2019-06-13 PROCEDURE — 99999 PR PBB SHADOW E&M-EST. PATIENT-LVL IV: CPT | Mod: PBBFAC,,, | Performed by: INTERNAL MEDICINE

## 2019-06-13 PROCEDURE — 99214 PR OFFICE/OUTPT VISIT, EST, LEVL IV, 30-39 MIN: ICD-10-PCS | Mod: S$PBB,,, | Performed by: INTERNAL MEDICINE

## 2019-06-13 PROCEDURE — 99999 PR PBB SHADOW E&M-EST. PATIENT-LVL IV: ICD-10-PCS | Mod: PBBFAC,,, | Performed by: INTERNAL MEDICINE

## 2019-06-13 NOTE — PROGRESS NOTES
Subjective:       Patient ID: Lou Jc is a 49 y.o. female.    Chief Complaint: Follow-up and Pain (left breast area)    Pain   Pertinent negatives include no abdominal pain, chest pain, coughing, fatigue, fever, headaches or rash.          Diagnosis: Stage 0  pTis N0 M0 grade 2 ER + AL + DCIS of the lt breast s/p lt partial mastectomy 9/21/18. with positive margin s/p re-excison on 11/16/18  S/p adjuvant XRT left breast under direction of Dr. Vázquez at [facility] on 1/25/2019           HPI: Pt is a 48 y.o. female seen today for f/u for   Left breast cancer. She was initially seen at [facility] ([facility] in [place], [place]). She had a BI-RADS 0 bilateral screening mammogram for architectural distortion and subsequent bilateral diagnostic mammogram and left breast ultrasound that were BI-RADS 4 and revealed focal asymmetry at the outer central position left breast She subsequently underwent ultrasound-guided core needle biopsy on 8/17/18 with pathology showing fragmented papilloma with no evidence of atypia or in-situ or invasive carcinoma. She was then seen by General Surgery at [facility] on 8/31/18 to discuss wire localization excisional biopsy of this intraductal papilloma, which ultimately occurred on 9/21/18. Final pathology from the excision biopsy upstaged to a 2.0 cm grade 2 ER+ (moderate) AL+ (moderate) ductal carcinoma in situ with focally positive medial margin with no evidence of invasive carcinoma. She subsequently underwent re-excison on 11/16/18. Final pathology showed LCIS but no residual DCIS. Margins uninvolved with closest margins are medial and inferior - 1mm, superior 6mm. She routinely performs self breast exams. She has not noted a change on breast exam. Patient denies nipple discharge. She reports she has not undergone annual routine screening mammograms. She reports history of previous breast bx in 2015- pathologically benign. She underwent [facility] testing with was negative .          She is followed by Rad/Onc  She has completed adjuvant XRT left breast to a dose of 5005cGy under direction of Dr. Vázquez at [facility] on 1/25/2019          She is alone at visit     Today, she is doing well  She continues with mild hot flasjes  No new issues  No SOB/CP  Appetite and weight stable  No arthralgias  She continues with diffuse breast tenderness        Mammo 4/23/2019    BI-RADS Category:   Overall: 2 - Benign      [facility], negative Integrated [facility] with [facility]      Risk Factors/Breast History Age of Menarche: 10y.o. Age of Menopause: 48. LMP 2016. History of Hormonal Therapy: OCPs x 4 years. No HRT.Nulliparous Family History of Breast Ca: Maternal aunt (age 60s). Maternal aunt (age 70s). Paternal aunt (age 70s).   No Family hx of Ovarian Cancer. Other Family History: Father with pancreatic cancer.         Social History .She is a chronic smoker. She has smoked 1ppd x 25 yrs. She drinks 2 beers/week.            Past Medical History:   Diagnosis Date    Allergy     Breast cyst     DCIS (ductal carcinoma in situ)     Left    Endometriosis     Papilloma of breast     Reflux          Past Surgical History:   Procedure Laterality Date    BREAST BIOPSY Left     papilloma    BREAST CYST ASPIRATION Left     BREAST LUMPECTOMY      2 lumpectomy    diagnostic lap      LUMPECTOMY, BREAST-re-ecxision Left 11/16/2018    [facility] 2ND FLR    LUMPECTOMY,BREAST Left 9/21/2018    [facility] at [facility] OR    TONSILLECTOMY         Review of Systems   Constitutional: Negative for appetite change, fatigue, fever and unexpected weight change.   HENT: Negative for mouth sores.    Eyes: Negative for visual disturbance.   Respiratory: Negative for cough and shortness of breath.    Cardiovascular: Negative for chest pain.   Gastrointestinal: Negative for abdominal pain and diarrhea.   Genitourinary: Negative for frequency.   Musculoskeletal:  "Negative for back pain.   Skin: Negative for rash.   Neurological: Negative for headaches.   Hematological: Negative for adenopathy.   Psychiatric/Behavioral: The patient is nervous/anxious.        Objective:        Vitals:    06/13/19 0810 06/13/19 0838   BP: 139/74 (!) 132/90   BP Location: Left arm Left arm   Patient Position: Sitting Sitting   BP Method: Medium (Automatic) Medium (Automatic)   Pulse: 66 62   Temp: 98.2 °F (36.8 °C)    TempSrc: Oral    SpO2: 98%    Weight: 62.2 kg (137 lb 2 oz)    Height: 4' 11" (1.499 m)        Physical Exam   Constitutional: She is oriented to person, place, and time. She appears well-developed and well-nourished.   HENT:   Head: Normocephalic.   Eyes: Conjunctivae and lids are normal. No scleral icterus.   Neck: Normal range of motion. Neck supple. No thyromegaly present.   Cardiovascular: Normal rate, regular rhythm and normal heart sounds.   No murmur heard.  Pulmonary/Chest: Breath sounds normal. She has no wheezes. She has no rales. Right breast exhibits tenderness. Right breast exhibits no mass, no nipple discharge and no skin change. Left breast exhibits tenderness. Left breast exhibits no mass, no nipple discharge and no skin change.   Lt breast- minor skin changes noted at radiation site       Abdominal: Soft. Bowel sounds are normal. She exhibits no distension and no mass. There is no hepatosplenomegaly. There is no tenderness. There is no rebound and no guarding.   Musculoskeletal: Normal range of motion. She exhibits no edema or tenderness.   Lymphadenopathy:     She has no cervical adenopathy.     She has no axillary adenopathy.        Right: No supraclavicular adenopathy present.        Left: No supraclavicular adenopathy present.   Neurological: She is alert and oriented to person, place, and time. No cranial nerve deficit. Coordination normal.   Skin: Skin is warm and dry. No ecchymosis, no petechiae and no rash noted. No erythema.   Psychiatric: She has a " normal mood and affect.         Lab Results   Component Value Date    WBC 6.49 06/10/2019    HGB 13.8 06/10/2019    HCT 42.5 06/10/2019    MCV 92 06/10/2019     06/10/2019     Results for STEFFANIE JIMENEZ (MRN [id number]) as of 1/10/2019 09:20   Ref. Range 12/6/2018 10:27   Estradiol Latest Ref Range: See Text pg/mL <10 (A)   Testosterone, Free Latest Units: pg/mL 1.8     FINAL PATHOLOGIC DIAGNOSIS 9/21/2018   Left breast, wire localized lumpectomy:  -Ductal carcinoma in situ (DCIS), Grade 2 (intermediate), focally involving the medial margin, present in 5  blocks with an estimated size of at least 20 mm (see cancer case summary below)  -Background breast tissue with fibrocystic change including dense fibrosis, papillomatosis, adenosis, cystic  dilatation, apocrine metaplasia, and fibroadenomatoid nodules  -Biopsy site changes  -No evidence of invasive carcinoma  Surgical pathology cancer case summary:  -Procedure: Excision (less than total mastectomy)  -Specimen laterality: Left  -Size (extent) of DCIS: Estimated size of DCIS: At least 20 mm: Number blocks with DCIS: 5  -Histologic type: Ductal carcinoma in situ  -Architectural patterns: Solid  -Nuclear grade: Grade 2 (intermediate)  -Necrosis: Not identified  -Margins: Medial margin focally positive for DCIS  -Regional lymph nodes: No lymph nodes submitted or found  -Pathologic stage classification (pTNM):  Primary tumor (pT): pTis (DCIS): Ductal carcinoma in situ  Regional lymph nodes (pN): pNX: Regional lymph nodes cannot be assessed  -Microcalcifications: Present in DCIS and nonneoplastic tissue     Hormone receptor results (performed with appropriate controls):  ER - Positive (moderate)  KY - Positive (moderate)           FINAL PATHOLOGIC DIAGNOSIS  11/16/2018    1. LEFT BREAST, RE-EXCISION:  No residual ductal carcinoma in-situ  Lobular carcinoma in-situ  Margins negative for neoplasia or malignancy (closest margins are medial and inferior - 1mm,  superior 6mm)  Atypical lobular hyperplasia  Intraductal papilloma  Negative for malignancy  Extensive previous procedure related changes present  (specimen has been submitted in it's entirety)  2. NEW ANTERIOR MARGIN, EXCISION:  Benign skin and subcutaneous tissue with extensive inflammation and procedure related changes  Negative for neoplasia or malignancy  (specimen has been submitted in its entirety)  Part1.  Immunostain E-Cadherin has been performed (with appropriate controls) on two sections #1C AND 1G and is  negative, supporting the above diagnosis.  Hormone receptors performed for LCIS (Part 1)  ER - Positive (90% of the tumor nuclei, moderate to strong)  MI - Positive (30% of the tumor nuclei, weak)  HER2 - Indeterminate (2+), specimen will be sent out for FISH analysis  Ki67 - 5%, positive tumor nuclei  [id number],ER,PGR            Bone Density  1/18/2018   Osteopenia.  Ten year probability of major osteoporotic fracture is 3.9%, and hip fracture is 0.2%.      Results for STEFFANIE JIMENEZ (MRN [id number]) as of 6/13/2019 08:27   Ref. Range 4/8/2019 10:08 6/10/2019 08:19   Vit D, 25-Hydroxy Latest Ref Range: 30 - 96 ng/mL 45 52       Assessment:       1. Ductal carcinoma in situ (DCIS) of left breast    2. History of partial mastectomy of left breast    3. Encounter for monitoring aromatase inhibitor therapy        Plan:       1-3   Patient is a 48 y.o. female with Stage 0 pTis N0 M0 grade 2 ER + MI + DCIS of the left breast status post left partial mastectomy with focally positive medial margin s/p re-excison on 11/16/18. Final pathology showed LCIS but no residual DCIS with negative with closest margins are medial and inferior - 1mm   S/p  adjuvant XRT  under the direction of Rad/Onc at [facility] completed 1/25/2019     [facility], negative Integrated BRACAnalysis with [employer]       Bone density  1/10/2019 reveals osteopenia  Cont Ca and Vit D   Consider bisphosphonate therapy   Plan bone density every  1  To 2 years   Continue weight bearing exercises  Avoidance of smoking        Continue monthly SBE  Plan  bilateral mammogram alternating with MRI surveillance moving forward.    She will continue with monthly self-breast exams  Follow-up Mammo BI-RADS Category:  Overall: 2 - Benign    Plan MRI in 6 months  We previously discussed lifestyle and weight-bearing exercises and alcohol in moderation recommended.   We will continue to monitor her bone health as well.  She will follow-up every 3 months for years 1-3     Cont Arimidex 1 mg po qd  Plan 5 yrs of Aromatase inhibitor   Rx provided for Clonidine 0.1mg po bid prn hot flashes       It has been recommended that she undergo a bilateral mammogram alternating with MRI surveillance moving forward.    She will continue with monthly self-breast exams    She will follow-up in 3  mos  The patient is advised on  continued exam of the breast  and to report to this office sooner should she note any areas of abnormality or concern.   .    Advance Care Planning     Power of   I initiated the process of advance care planning today and explained the importance of this process to the patient.  I introduced the concept of advance directives to the patient, as well. Then the patient received detailed information about the importance of designating a Health Care Power of  (HCPOA). She was also instructed to communicate with this person about their wishes for future healthcare, should she become sick and lose decision-making capacity. The patient has not previously appointed a HCPOA. After our discussion, the patient has decided to complete a HCPOA and has appointed her brother  Xiang Trinidad [phone] and NAME:  Leon Lay [phone]  I spent a total time of 16  minutes discussing this issue with the patient.    Advance Care Planning     Living Will  During this visit, I engaged the patient in the advance care planning process.  The patient and I  reviewed the role for advance directives and their purpose in directing future healthcare if the patient's unable to speak for him/herself.  At this point in time, the patient does have full decision-making capacity.  We discussed different extreme health states that she could experience, and reviewed what kind of medical care she would want in those situations.  The patient communicated that if she were comatose and had little chance of a meaningful recovery, she would not want machines/life-sustaining treatments used.    The patient has completed a living will to reflect these preferences.  The patient  has already designated a healthcare power of  to make decisions on her behalf.   I spent a total of  16  minutes engaging the patient in this advance care planning discussion.               Cc: MD Maryjo Pagan MD

## 2019-06-23 ENCOUNTER — HOSPITAL ENCOUNTER (EMERGENCY)
Facility: HOSPITAL | Age: 49
Discharge: HOME | End: 2019-06-23
Attending: EMERGENCY MEDICINE
Payer: MEDICAID

## 2019-06-23 VITALS
HEIGHT: 59 IN | OXYGEN SATURATION: 100 % | SYSTOLIC BLOOD PRESSURE: 140 MMHG | TEMPERATURE: 98 F | WEIGHT: 135 LBS | RESPIRATION RATE: 18 BRPM | HEART RATE: 81 BPM | DIASTOLIC BLOOD PRESSURE: 74 MMHG | BODY MASS INDEX: 27.21 KG/M2

## 2019-06-23 DIAGNOSIS — N39.0 URINARY TRACT INFECTION WITHOUT HEMATURIA, SITE UNSPECIFIED: ICD-10-CM

## 2019-06-23 DIAGNOSIS — R03.0 ELEVATED BLOOD PRESSURE READING: ICD-10-CM

## 2019-06-23 DIAGNOSIS — N20.0 KIDNEY STONE: Primary | ICD-10-CM

## 2019-06-23 LAB
ALBUMIN SERPL BCP-MCNC: 4.6 G/DL (ref 3.5–5.2)
ALP SERPL-CCNC: 128 U/L (ref 55–135)
ALT SERPL W/O P-5'-P-CCNC: 30 U/L (ref 10–44)
ANION GAP SERPL CALC-SCNC: 12 MMOL/L (ref 8–16)
AST SERPL-CCNC: 20 U/L (ref 10–40)
BACTERIA #/AREA URNS HPF: ABNORMAL /HPF
BASOPHILS # BLD AUTO: 0.03 K/UL (ref 0–0.2)
BASOPHILS NFR BLD: 0.3 % (ref 0–1.9)
BILIRUB SERPL-MCNC: 0.4 MG/DL (ref 0.1–1)
BILIRUB UR QL STRIP: NEGATIVE
BUN SERPL-MCNC: 21 MG/DL (ref 6–20)
CALCIUM SERPL-MCNC: 11 MG/DL (ref 8.7–10.5)
CHLORIDE SERPL-SCNC: 104 MMOL/L (ref 95–110)
CLARITY UR: ABNORMAL
CO2 SERPL-SCNC: 26 MMOL/L (ref 23–29)
COLOR UR: ABNORMAL
CREAT SERPL-MCNC: 1 MG/DL (ref 0.5–1.4)
DIFFERENTIAL METHOD: ABNORMAL
EOSINOPHIL # BLD AUTO: 0.2 K/UL (ref 0–0.5)
EOSINOPHIL NFR BLD: 1.9 % (ref 0–8)
ERYTHROCYTE [DISTWIDTH] IN BLOOD BY AUTOMATED COUNT: 13.7 % (ref 11.5–14.5)
EST. GFR  (AFRICAN AMERICAN): >60 ML/MIN/1.73 M^2
EST. GFR  (NON AFRICAN AMERICAN): >60 ML/MIN/1.73 M^2
GLUCOSE SERPL-MCNC: 110 MG/DL (ref 70–110)
GLUCOSE UR QL STRIP: NEGATIVE
HCT VFR BLD AUTO: 44 % (ref 37–48.5)
HGB BLD-MCNC: 14.3 G/DL (ref 12–16)
HGB UR QL STRIP: NEGATIVE
KETONES UR QL STRIP: NEGATIVE
LEUKOCYTE ESTERASE UR QL STRIP: NEGATIVE
LIPASE SERPL-CCNC: 39 U/L (ref 4–60)
LYMPHOCYTES # BLD AUTO: 1.7 K/UL (ref 1–4.8)
LYMPHOCYTES NFR BLD: 15.9 % (ref 18–48)
MCH RBC QN AUTO: 30.1 PG (ref 27–31)
MCHC RBC AUTO-ENTMCNC: 32.5 G/DL (ref 32–36)
MCV RBC AUTO: 93 FL (ref 82–98)
MICROSCOPIC COMMENT: ABNORMAL
MONOCYTES # BLD AUTO: 0.6 K/UL (ref 0.3–1)
MONOCYTES NFR BLD: 5.3 % (ref 4–15)
NEUTROPHILS # BLD AUTO: 8.2 K/UL (ref 1.8–7.7)
NEUTROPHILS NFR BLD: 76.6 % (ref 38–73)
NITRITE UR QL STRIP: NEGATIVE
PH UR STRIP: 5 [PH] (ref 5–8)
PLATELET # BLD AUTO: 270 K/UL (ref 150–350)
PMV BLD AUTO: 11.1 FL (ref 9.2–12.9)
POTASSIUM SERPL-SCNC: 4.1 MMOL/L (ref 3.5–5.1)
PROT SERPL-MCNC: 7.7 G/DL (ref 6–8.4)
PROT UR QL STRIP: NEGATIVE
RBC # BLD AUTO: 4.75 M/UL (ref 4–5.4)
RBC #/AREA URNS HPF: 2 /HPF (ref 0–4)
SODIUM SERPL-SCNC: 142 MMOL/L (ref 136–145)
SP GR UR STRIP: 1.03 (ref 1–1.03)
SQUAMOUS #/AREA URNS HPF: 1 /HPF
URN SPEC COLLECT METH UR: ABNORMAL
UROBILINOGEN UR STRIP-ACNC: NEGATIVE EU/DL
WBC # BLD AUTO: 10.7 K/UL (ref 3.9–12.7)
WBC #/AREA URNS HPF: 1 /HPF (ref 0–5)

## 2019-06-23 PROCEDURE — 96361 HYDRATE IV INFUSION ADD-ON: CPT

## 2019-06-23 PROCEDURE — 25000003 PHARM REV CODE 250: Performed by: EMERGENCY MEDICINE

## 2019-06-23 PROCEDURE — 85025 COMPLETE CBC W/AUTO DIFF WBC: CPT

## 2019-06-23 PROCEDURE — 83690 ASSAY OF LIPASE: CPT

## 2019-06-23 PROCEDURE — 81000 URINALYSIS NONAUTO W/SCOPE: CPT

## 2019-06-23 PROCEDURE — 80053 COMPREHEN METABOLIC PANEL: CPT

## 2019-06-23 PROCEDURE — 99284 EMERGENCY DEPT VISIT MOD MDM: CPT | Mod: 25

## 2019-06-23 PROCEDURE — 96375 TX/PRO/DX INJ NEW DRUG ADDON: CPT

## 2019-06-23 PROCEDURE — 63600175 PHARM REV CODE 636 W HCPCS: Performed by: EMERGENCY MEDICINE

## 2019-06-23 PROCEDURE — 96374 THER/PROPH/DIAG INJ IV PUSH: CPT

## 2019-06-23 RX ORDER — BACLOFEN 10 MG/1
10 TABLET ORAL ONCE
Status: COMPLETED | OUTPATIENT
Start: 2019-06-23 | End: 2019-06-23

## 2019-06-23 RX ORDER — CEPHALEXIN 500 MG/1
500 CAPSULE ORAL 4 TIMES DAILY
Qty: 56 CAPSULE | Refills: 0 | Status: SHIPPED | OUTPATIENT
Start: 2019-06-23 | End: 2019-07-07

## 2019-06-23 RX ORDER — OXYCODONE AND ACETAMINOPHEN 5; 325 MG/1; MG/1
1 TABLET ORAL EVERY 6 HOURS PRN
Qty: 18 TABLET | Refills: 0 | Status: SHIPPED | OUTPATIENT
Start: 2019-06-23 | End: 2019-09-12

## 2019-06-23 RX ORDER — CIPROFLOXACIN 500 MG/1
500 TABLET ORAL
Status: COMPLETED | OUTPATIENT
Start: 2019-06-23 | End: 2019-06-23

## 2019-06-23 RX ORDER — KETOROLAC TROMETHAMINE 30 MG/ML
30 INJECTION, SOLUTION INTRAMUSCULAR; INTRAVENOUS
Status: COMPLETED | OUTPATIENT
Start: 2019-06-23 | End: 2019-06-23

## 2019-06-23 RX ORDER — CIPROFLOXACIN 500 MG/1
500 TABLET ORAL 2 TIMES DAILY
Qty: 28 TABLET | Refills: 0 | Status: SHIPPED | OUTPATIENT
Start: 2019-06-23 | End: 2019-07-07

## 2019-06-23 RX ORDER — ONDANSETRON 4 MG/1
4 TABLET, FILM COATED ORAL EVERY 6 HOURS PRN
Qty: 12 TABLET | Refills: 0 | Status: SHIPPED | OUTPATIENT
Start: 2019-06-23 | End: 2019-09-12

## 2019-06-23 RX ORDER — KETOROLAC TROMETHAMINE 30 MG/ML
30 INJECTION, SOLUTION INTRAMUSCULAR; INTRAVENOUS
Status: DISCONTINUED | OUTPATIENT
Start: 2019-06-23 | End: 2019-06-23

## 2019-06-23 RX ORDER — TAMSULOSIN HYDROCHLORIDE 0.4 MG/1
0.4 CAPSULE ORAL DAILY
Qty: 30 CAPSULE | Refills: 0 | Status: SHIPPED | OUTPATIENT
Start: 2019-06-23 | End: 2019-09-12

## 2019-06-23 RX ORDER — HYDROMORPHONE HYDROCHLORIDE 2 MG/ML
1 INJECTION, SOLUTION INTRAMUSCULAR; INTRAVENOUS; SUBCUTANEOUS
Status: COMPLETED | OUTPATIENT
Start: 2019-06-23 | End: 2019-06-23

## 2019-06-23 RX ORDER — MELOXICAM 15 MG/1
15 TABLET ORAL DAILY
Qty: 30 TABLET | Refills: 0 | Status: SHIPPED | OUTPATIENT
Start: 2019-06-23 | End: 2019-09-12

## 2019-06-23 RX ORDER — BACLOFEN 10 MG/1
10 TABLET ORAL ONCE
Status: DISCONTINUED | OUTPATIENT
Start: 2019-06-23 | End: 2019-06-23

## 2019-06-23 RX ORDER — CEPHALEXIN 250 MG/1
500 CAPSULE ORAL
Status: COMPLETED | OUTPATIENT
Start: 2019-06-23 | End: 2019-06-23

## 2019-06-23 RX ADMIN — CEPHALEXIN 500 MG: 250 CAPSULE ORAL at 05:06

## 2019-06-23 RX ADMIN — KETOROLAC TROMETHAMINE 30 MG: 30 INJECTION, SOLUTION INTRAMUSCULAR; INTRAVENOUS at 03:06

## 2019-06-23 RX ADMIN — BACLOFEN 10 MG: 10 TABLET ORAL at 03:06

## 2019-06-23 RX ADMIN — HYDROMORPHONE HYDROCHLORIDE 1 MG: 2 INJECTION, SOLUTION INTRAMUSCULAR; INTRAVENOUS; SUBCUTANEOUS at 03:06

## 2019-06-23 RX ADMIN — SODIUM CHLORIDE 1000 ML: 0.9 INJECTION, SOLUTION INTRAVENOUS at 03:06

## 2019-06-23 RX ADMIN — CIPROFLOXACIN HYDROCHLORIDE 500 MG: 500 TABLET, FILM COATED ORAL at 05:06

## 2019-06-23 NOTE — ED TRIAGE NOTES
"Pt here for left side flank pain, lower abd pain/spasms. Pt reports bladder feels full but when she attempts to urinate "only a little comes out. But I haven't drank much today either". Pt denies any n/v/d, no pain with urination, no fevers. Pt denies hx of kidney stones.   "

## 2019-06-23 NOTE — ED PROVIDER NOTES
Encounter Date: 2019       History     Chief Complaint   Patient presents with    Flank Pain     c/o constant left sided flank pain starting today, denies hx of kidney stones, denies n/v/d.      49 y.o. 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 No hematuria/dysuria or other complaints.            Review of patient's allergies indicates:  No Known Allergies  Past Medical History:   Diagnosis Date    Allergy     Breast cyst     DCIS (ductal carcinoma in situ)     Left    Endometriosis     Papilloma of breast     Reflux      Past Surgical History:   Procedure Laterality Date    BREAST BIOPSY Left     papilloma    BREAST CYST ASPIRATION Left     BREAST LUMPECTOMY      2 lumpectomy    diagnostic lap      LUMPECTOMY, BREAST-re-ecxision Left 2018    [facility] OR 2ND FLR    LUMPECTOMY,BREAST Left 2018    [facility] OR    TONSILLECTOMY       Family History   Problem Relation Age of Onset    Diabetes Mother     Heart disease Mother     COPD Mother     Hypertension Mother     Kidney disease Mother     Cancer Mother     Throat cancer Mother     Depression Mother     Hearing loss Mother     Heart disease Father     Cancer Father         Pancreatic    Diabetes Father     Bone cancer Father     Breast cancer Maternal Aunt     Breast cancer Paternal Aunt     Breast cancer Maternal Aunt     Colon cancer Neg Hx     Ovarian cancer Neg Hx      Social History     Tobacco Use    Smoking status: Former Smoker     Packs/day: 0.50     Years: 20.00     Pack years: 10.00     Types: Cigarettes     Last attempt to quit: 2017     Years since quittin.8    Smokeless tobacco: Never Used   Substance  Use Topics    Alcohol use: Yes     Alcohol/week: 1.2 oz     Types: 2 Cans of beer per week    Drug use: Yes     Types: Marijuana     Review of Systems   Constitutional: Negative for fever.   HENT: Negative for sore throat.    Respiratory: Negative for shortness of breath.    Cardiovascular: Negative for chest pain.   Gastrointestinal: Negative for nausea.   Genitourinary: Positive for flank pain. Negative for dysuria.   Musculoskeletal: Negative for back pain.   Skin: Negative for rash.   Neurological: Negative for weakness.   Hematological: Does not bruise/bleed easily.   All other systems reviewed and are negative.      Physical Exam     Initial Vitals [06/23/19 1440]   BP Pulse Resp Temp SpO2   (!) 162/74 92 20 97.7 °F (36.5 °C) 100 %      MAP       --         Physical Exam    Nursing note and vitals reviewed.  Constitutional: She appears well-developed and well-nourished.   HENT:   Head: Normocephalic and atraumatic.   Eyes: Conjunctivae and EOM are normal. Pupils are equal, round, and reactive to light.   Neck: Normal range of motion.   Cardiovascular: Normal rate and regular rhythm.   Pulmonary/Chest: Breath sounds normal. No respiratory distress.   Abdominal: She exhibits no distension.   Musculoskeletal: Normal range of motion.   Neurological: She is alert. No cranial nerve deficit. GCS score is 15. GCS eye subscore is 4. GCS verbal subscore is 5. GCS motor subscore is 6.   Skin: Skin is warm and dry.   Psychiatric: She has a normal mood and affect. Thought content normal.       L flank spasm, completely reproducible with palpation  ED Course   Procedures  Labs Reviewed   URINALYSIS, REFLEX TO URINE CULTURE   CBC W/ AUTO DIFFERENTIAL   COMPREHENSIVE METABOLIC PANEL   LIPASE   POCT URINE PREGNANCY          Imaging Results    None          Medical Decision Making:   Initial Assessment:   Will do screening labs, ua, ct renal protocol, symptomatic treatment, but suspect MSK etiology    Pt complains to nurse  the sensation of urinary retention despite need to urinate. Will have her place michael to see PVR.         pt does not have urinary retention.     CT Renal Stone Study ABD Pelvis WO   Final Result   Abnormal      1. Mild left-sided hydroureteronephrosis secondary to a 3 mm calculus at the UVJ.  Additional left renal 4 mm nephrolith.   2. Hepatomegaly.   3. Right lung base 4 mm solid pleural based nodule.  For a solid nodule <6 mm, Fleischner Society 2017 guidelines recommend no routine follow up for a low risk patient, or follow-up with non-contrast chest CT at 12 months in a high risk patient.   4. Few additional findings as above.   This report was flagged in Epic as abnormal.         Electronically signed by: Margarito Morris MD   Date:    06/23/2019   Time:    16:52        Labs Reviewed   URINALYSIS, REFLEX TO URINE CULTURE - Abnormal; Notable for the following components:       Result Value    Appearance, UA Cloudy (*)     All other components within normal limits    Narrative:     Preferred Collection Type->Urine, Clean Catch   CBC W/ AUTO DIFFERENTIAL - Abnormal; Notable for the following components:    Gran # (ANC) 8.2 (*)     Gran% 76.6 (*)     Lymph% 15.9 (*)     All other components within normal limits   COMPREHENSIVE METABOLIC PANEL - Abnormal; Notable for the following components:    BUN, Bld 21 (*)     Calcium 11.0 (*)     All other components within normal limits   URINALYSIS MICROSCOPIC - Abnormal; Notable for the following components:    Bacteria Moderate (*)     All other components within normal limits    Narrative:     Preferred Collection Type->Urine, Clean Catch   LIPASE       UA shows moderate bacteria. 4mm stone. Will start on flomax, cipro/keflex, nsaids/percocet and have pt f/u with urology.              Clinical Impression:       ICD-10-CM ICD-9-CM   1. Kidney stone N20.0 592.0   2. Urinary tract infection without hematuria, site unspecified N39.0 599.0   3. Elevated blood pressure reading  R03.0 796.2                                Corwin Collins MD  06/23/19 1715

## 2019-06-23 NOTE — DISCHARGE INSTRUCTIONS
Follow up in 12 months for non emergent ct chest to further evaluate lung nodule.    Thank you for coming to our Emergency Department today. It is important to remember that some problems are difficult to diagnose and may not be found during your first visit. Be sure to follow up with your primary care doctor and review any labs/imaging that was performed with them. If you do not have a primary care doctor, you may contact the one listed on your discharge paperwork or you may also call the [facility] Appointment Desk at [phone] to schedule an appointment with one.     All medications may potentially have side effects and it is impossible to predict which medications may give you side effects. If you feel that you are having a negative effect of any medication you should immediately stop taking them and seek medical attention.    Return to the ER with any questions/concerns, new/concerning symptoms, worsening or failure to improve. Do not drive or make any important decisions for 24 hours if you have received any pain medications, sedatives or mood altering drugs during your ER visit.

## 2019-07-22 NOTE — PROGRESS NOTES
Breast Surgery  [facility]  Department of Surgery      REFERRING PROVIDER: No referring provider defined for this encounter.    Chief Complaint: DCIS    Subjective:      Patient ID: Lou Jc is a 49 y.o. female who returns for follow up discussion of surgical options and surgical planning.      She initially who presented with newly diagnosed LEFT breast cancer. She was initially seen at OSH ([facility] in [place], [place]). She had a BI-RADS 0 bilateral screening mammogram for architectural distortion and subsequent bilateral diagnostic mammogram and left breast ultrasound that were BI-RADS 4. She subsequently underwent ultrasound-guided core needle biopsy on 8/17/18 with pathology showing fragmented papilloma with no evidence of atypia or in-situ or invasive carcinoma. She was then seen by General Surgery at OSH on 8/31/18 to discuss wire localization excisional biopsy of this intraductal papilloma, which ultimately occurred on 9/21/18. Final pathology from the excision biopsy upstaged to a 2.0 cm grade 2 ER+ (moderate) FL+ (moderate) ductal carcinoma in situ with focally positive medial margin. She was seen back in clinic at OSH to discuss surgical options on 10/8/18.     Patient does routinely do self breast exams. Patient has not noted a change on breast exam. Patient denies nipple discharge. Patient denies previous breast biopsy (prior to current events). Patient denies a personal history of breast cancer (prior to current events).    Findings at that time were the following:   Tumor size: 2.0 cm (focally positive margin - medial)  Tumor rdgrdrrdarddrderd:rd rd3rd Estrogen Receptor: +   Progesterone Receptor: +   Her-2 ryan: N/A   Lymph node status: Clinically negative  Lymphatic invasion: N/A     Risk Factors/Breast History  Age of Menarche: 10  Age of Menopause: 48  History of Hormonal Therapy: OCPs x 4 years. No HRT.  Number of Pregnancies: Nulliparous  Age of First Birth: N/A  Lactational History:  N/A  Mammogram History: Annual or every 2 years for > 10 years  History of Chest Radiation: No  History of Breast Bx: Yes (current events)  History of Breast Ca: Yes (current events)  Family History of Breast Ca: Maternal aunt (age 60s). Maternal aunt (age 70s). Paternal aunt (age 70s).  Family History of Ovarian Ca: No  Other Family History: Father with pancreatic cancer.    Current smoker. 1 PPD x 25 years.  No antiplatelet or anticoagulant agents.    Past Medical History:   Diagnosis Date    Allergy     Breast cyst     DCIS (ductal carcinoma in situ)     Left    Endometriosis     Papilloma of breast     Reflux      Past Surgical History:   Procedure Laterality Date    BREAST BIOPSY Left     papilloma    BREAST CYST ASPIRATION Left     BREAST LUMPECTOMY      2 lumpectomy    diagnostic lap      LUMPECTOMY, BREAST-re-ecxision Left 11/16/2018    Performed by Prabha Matias MD [facility] 2ND FLR    LUMPECTOMY,BREAST Left 9/21/2018    Per[facility] at [facility] OR    TONSILLECTOMY       Current Outpatient Medications on File Prior to Visit   Medication Sig Dispense Refill    ascorbic acid, vitamin C, (VITAMIN C) 100 MG tablet Take 100 mg by mouth once daily.      ketoconazole (NIZORAL) 2 % cream Apply topically once daily. 30 g 0     No current facility-administered medications on file prior to visit.      Social History     Socioeconomic History    Marital status:      Spouse name: Not on file    Number of children: Not on file    Years of education: unknown    Highest education level: Not on file   Occupational History    Not on file   Social Needs    Financial resource strain: Not on file    Food insecurity:     Worry: Not on file     Inability: Not on file    Transportation needs:     Medical: Not on file     Non-medical: Not on file   Tobacco Use    Smoking status: Former Smoker     Packs/day: 0.50     Years: 20.00     Pack years: 10.00     Types: Cigarettes     Last  attempt to quit: 2017     Years since quittin.8    Smokeless tobacco: Never Used   Substance and Sexual Activity    Alcohol use: Yes     Alcohol/week: 1.2 oz     Types: 2 Cans of beer per week    Drug use: Yes     Types: Marijuana    Sexual activity: Yes     Partners: Male     Birth control/protection: None   Lifestyle    Physical activity:     Days per week: Not on file     Minutes per session: Not on file    Stress: Not on file   Relationships    Social connections:     Talks on phone: Not on file     Gets together: Not on file     Attends Taoism service: Not on file     Active member of club or organization: Not on file     Attends meetings of clubs or organizations: Not on file     Relationship status: Not on file   Other Topics Concern    Are you pregnant or think you may be? Not Asked    Breast-feeding Not Asked   Social History Narrative    Not on file     Family History   Problem Relation Age of Onset    Diabetes Mother     Heart disease Mother     COPD Mother     Hypertension Mother     Kidney disease Mother     Cancer Mother     Throat cancer Mother     Depression Mother     Hearing loss Mother     Heart disease Father     Cancer Father         Pancreatic    Diabetes Father     Bone cancer Father     Breast cancer Maternal Aunt     Breast cancer Paternal Aunt     Breast cancer Maternal Aunt     Colon cancer Neg Hx     Ovarian cancer Neg Hx         Review of Systems   Constitutional: Negative for activity change, chills, fatigue and fever.   HENT: Negative for congestion, rhinorrhea and sore throat.    Eyes: Negative for visual disturbance.   Respiratory: Negative for cough, shortness of breath and wheezing.    Cardiovascular: Negative for chest pain, palpitations and leg swelling.   Gastrointestinal: Negative for abdominal distention, abdominal pain, constipation, diarrhea, nausea and vomiting.   Genitourinary: Negative for dysuria, frequency and hematuria.    Musculoskeletal: Negative for arthralgias and myalgias.   Skin: Negative for color change, rash and wound.   Neurological: Negative for syncope, weakness and numbness.   Hematological: Does not bruise/bleed easily.   Psychiatric/Behavioral: Negative for behavioral problems and confusion.        Objective:   LMP 06/27/2016     Physical Exam   Vitals reviewed.  Constitutional: She is oriented to person, place, and time. She appears well-developed and well-nourished.   Tearful throughout visit today   HENT:   Head: Normocephalic and atraumatic.   Eyes: Conjunctivae and EOM are normal.   Cardiovascular: Normal rate, regular rhythm and intact distal pulses.    Pulmonary/Chest: Effort normal. No respiratory distress. She has no wheezes. Right breast exhibits no inverted nipple, no mass, no nipple discharge, no skin change and no tenderness. Left breast exhibits no inverted nipple, no mass, no nipple discharge, no skin change and no tenderness. No breast swelling or bleeding. Breasts are symmetrical.   Abdominal: Soft. She exhibits no distension. There is no tenderness.   Musculoskeletal: Normal range of motion. She exhibits no edema or deformity.   Neurological: She is alert and oriented to person, place, and time.   Skin: Skin is warm and dry. No rash noted. She is not diaphoretic. No erythema.     Psychiatric: She has a normal mood and affect. Her behavior is normal.       Radiology review: Images personally reviewed by me in the clinic.   Bilateral Diagnostic Mammogram (8/10/18): BI-RADS 4. The breasts are extremely dense, which lowers the sensitivity of mammography. There is a focal asymmetry seen in the outer central region of the left breast. May correspond to a complicated cyst noted by ultrasound assessment. There are coarse heterogeneous calcifications seen in the left breast. Compared to the previous study, there are no significant changes. There is an asymmetry seen in the central region of the left breast in  the middle depth on the MLO view. Does not persist on spot compression view. There is architectural distortion seen in the central region of the right breast in the posterior depth on the MLO view. Does not persist upon additional imaging. There are multiple similar lymph nodes seen in both axillae. Compared to the previous study, there are no significant changes.    US Breast Left Limited (8/10/18): BI-RADS 4.There is a 9 x 8 x 3 mm oval complicated cyst with circumscribed margins seen in the left breast at 3 o'clock. Contains non-dependent debris or tissue. There are multiple similar oval simple cysts with circumscribed margins seen in the left breast at 3 o'clock.     Bilateral Screening Mammogram (7/18/18): BI-RADS 0. The breasts are extremely dense, which lowers the sensitivity of mammography. There is an asymmetry seen in the central region of the left breast in the posterior depth on the MLO view. This is a new finding. There is a focal asymmetry seen in the outer central region of the left breast in the middle depth. This is a new finding. There are coarse heterogeneous calcifications seen in the left breast. Compared to the previous study, there are no significant changes. There is architectural distortion seen in the central region of the right breast in the posterior depth on the MLO view. This is a new finding. There are multiple similar lymph nodes seen in both axillae. Compared to the previous study, there are no significant changes.     Assessment:       DCIS  Plan:     Options for management were discussed with the patient and her family. We reviewed the existing data noting the equivalency of breast conserving surgery with radiation therapy and mastectomy. We also reviewed the guidelines of the National Comprehensive Cancer Network for Stage 0 breast carcinoma. We discussed the need for lumpectomy margins to be negative for carcinoma, the necessity for postoperative radiation therapy after breast  conservation in most cases, the possibility of a failed or false negative sentinel lymph node biopsy and the potential need for complete lymphadenectomy for a failed or positive sentinel lymph node biopsy were fully discussed. In the setting of mastectomy, delayed or immediate reconstruction options are available and were discussed.     In the setting of lumpectomy, radiation therapy would be recommended majority of the time. The duration and treatment side effects were discussed with the patient. This will coordinated with the radiation oncologist pending final pathology.    We also discussed the role of systemic therapy in the treatment of early stage breast cancer. We discussed that this is based on tumor biology and long status and will be determined based on final pathology. We discussed that if the cancer is hormone positive, endocrine therapy would be recommended in most cases and its use can reduce the risk of recurrence as well as improve survival. Side effects of treatment were briefly discussed. We also discussed the potential role for chemotherapy based on a number of factors such as tumor phenotype (ER+ vs. triple negative vs. [id number]+) and this would be determined in coordination with the medical oncologist.    Genetics negative.  She has decided to proceed with re-excision    Surgery scheduled. Follow-up in clinic roughly 14 days after surgery.     Patient was educated on breast cancer, receptors, wire localization lumpectomy, mastectomy, sentinel lymph node mapping and biopsy, axillary lymph node dissection, reconstruction, breast prosthesis with post-mastectomy bra and radiation therapy. Patient was given patient information binder including [facility] broch[facility]. All her questions were answered.    Total time spent with the patient: 45 minutes. 30 minutes of face to face consultation and 15 minutes of chart review and coordination of care.

## 2019-07-24 ENCOUNTER — TELEPHONE (OUTPATIENT)
Dept: SURGERY | Facility: CLINIC | Age: 49
End: 2019-07-24

## 2019-07-24 NOTE — TELEPHONE ENCOUNTER
Return call to pt.F/U appts due in Oct scheduled with Dr Matias in Oct/Nov.Appt reminder mailed to pt.

## 2019-07-24 NOTE — TELEPHONE ENCOUNTER
----- Message from Sanam Marshall sent at 7/24/2019  1:58 PM CDT -----  Pt is calling to speak with the nurse to ask if she is suppose to wait 6 months to get her MRI of the breast      Pt can be reached at [phone]    Thank you!

## 2019-09-10 ENCOUNTER — LAB VISIT (OUTPATIENT)
Dept: LAB | Facility: HOSPITAL | Age: 49
End: 2019-09-10
Attending: INTERNAL MEDICINE
Payer: MEDICAID

## 2019-09-10 DIAGNOSIS — D05.12 DUCTAL CARCINOMA IN SITU (DCIS) OF LEFT BREAST: ICD-10-CM

## 2019-09-10 LAB
ALBUMIN SERPL BCP-MCNC: 4.5 G/DL (ref 3.5–5.2)
ALP SERPL-CCNC: 135 U/L (ref 55–135)
ALT SERPL W/O P-5'-P-CCNC: 28 U/L (ref 10–44)
ANION GAP SERPL CALC-SCNC: 9 MMOL/L (ref 8–16)
AST SERPL-CCNC: 23 U/L (ref 10–40)
BASOPHILS # BLD AUTO: 0.02 K/UL (ref 0–0.2)
BASOPHILS NFR BLD: 0.3 % (ref 0–1.9)
BILIRUB SERPL-MCNC: 0.4 MG/DL (ref 0.1–1)
BUN SERPL-MCNC: 16 MG/DL (ref 6–20)
CALCIUM SERPL-MCNC: 10.3 MG/DL (ref 8.7–10.5)
CHLORIDE SERPL-SCNC: 103 MMOL/L (ref 95–110)
CO2 SERPL-SCNC: 29 MMOL/L (ref 23–29)
CREAT SERPL-MCNC: 0.8 MG/DL (ref 0.5–1.4)
DIFFERENTIAL METHOD: ABNORMAL
EOSINOPHIL # BLD AUTO: 0.2 K/UL (ref 0–0.5)
EOSINOPHIL NFR BLD: 2.8 % (ref 0–8)
ERYTHROCYTE [DISTWIDTH] IN BLOOD BY AUTOMATED COUNT: 13.5 % (ref 11.5–14.5)
EST. GFR  (AFRICAN AMERICAN): >60 ML/MIN/1.73 M^2
EST. GFR  (NON AFRICAN AMERICAN): >60 ML/MIN/1.73 M^2
GLUCOSE SERPL-MCNC: 96 MG/DL (ref 70–110)
HCT VFR BLD AUTO: 42.7 % (ref 37–48.5)
HGB BLD-MCNC: 13.5 G/DL (ref 12–16)
LYMPHOCYTES # BLD AUTO: 1.8 K/UL (ref 1–4.8)
LYMPHOCYTES NFR BLD: 22.8 % (ref 18–48)
MCH RBC QN AUTO: 29.1 PG (ref 27–31)
MCHC RBC AUTO-ENTMCNC: 31.6 G/DL (ref 32–36)
MCV RBC AUTO: 92 FL (ref 82–98)
MONOCYTES # BLD AUTO: 0.7 K/UL (ref 0.3–1)
MONOCYTES NFR BLD: 8.9 % (ref 4–15)
NEUTROPHILS # BLD AUTO: 5.1 K/UL (ref 1.8–7.7)
NEUTROPHILS NFR BLD: 65.3 % (ref 38–73)
PLATELET # BLD AUTO: 276 K/UL (ref 150–350)
PMV BLD AUTO: 10.5 FL (ref 9.2–12.9)
POTASSIUM SERPL-SCNC: 4.2 MMOL/L (ref 3.5–5.1)
PROT SERPL-MCNC: 7.8 G/DL (ref 6–8.4)
RBC # BLD AUTO: 4.64 M/UL (ref 4–5.4)
SODIUM SERPL-SCNC: 141 MMOL/L (ref 136–145)
WBC # BLD AUTO: 7.85 K/UL (ref 3.9–12.7)

## 2019-09-10 PROCEDURE — 80053 COMPREHEN METABOLIC PANEL: CPT

## 2019-09-10 PROCEDURE — 36415 COLL VENOUS BLD VENIPUNCTURE: CPT

## 2019-09-10 PROCEDURE — 85025 COMPLETE CBC W/AUTO DIFF WBC: CPT

## 2019-09-12 ENCOUNTER — TELEPHONE (OUTPATIENT)
Dept: SURGERY | Facility: CLINIC | Age: 49
End: 2019-09-12

## 2019-09-12 ENCOUNTER — OFFICE VISIT (OUTPATIENT)
Dept: HEMATOLOGY/ONCOLOGY | Facility: CLINIC | Age: 49
End: 2019-09-12
Payer: MEDICAID

## 2019-09-12 VITALS
HEART RATE: 78 BPM | SYSTOLIC BLOOD PRESSURE: 120 MMHG | DIASTOLIC BLOOD PRESSURE: 80 MMHG | TEMPERATURE: 98 F | HEIGHT: 59 IN | WEIGHT: 140.19 LBS | OXYGEN SATURATION: 99 % | BODY MASS INDEX: 28.26 KG/M2

## 2019-09-12 DIAGNOSIS — T45.1X5A HOT FLASHES RELATED TO AROMATASE INHIBITOR THERAPY: ICD-10-CM

## 2019-09-12 DIAGNOSIS — Z79.811 ENCOUNTER FOR MONITORING AROMATASE INHIBITOR THERAPY: ICD-10-CM

## 2019-09-12 DIAGNOSIS — R23.2 HOT FLASHES RELATED TO AROMATASE INHIBITOR THERAPY: ICD-10-CM

## 2019-09-12 DIAGNOSIS — Z51.81 ENCOUNTER FOR MONITORING AROMATASE INHIBITOR THERAPY: ICD-10-CM

## 2019-09-12 DIAGNOSIS — D05.12 DUCTAL CARCINOMA IN SITU (DCIS) OF LEFT BREAST: Primary | ICD-10-CM

## 2019-09-12 DIAGNOSIS — N64.4 BREAST PAIN: ICD-10-CM

## 2019-09-12 DIAGNOSIS — Z90.12 HISTORY OF PARTIAL MASTECTOMY OF LEFT BREAST: ICD-10-CM

## 2019-09-12 PROCEDURE — 99999 PR PBB SHADOW E&M-EST. PATIENT-LVL IV: ICD-10-PCS | Mod: PBBFAC,,, | Performed by: INTERNAL MEDICINE

## 2019-09-12 PROCEDURE — 99214 OFFICE O/P EST MOD 30 MIN: CPT | Mod: S$PBB,,, | Performed by: INTERNAL MEDICINE

## 2019-09-12 PROCEDURE — 99999 PR PBB SHADOW E&M-EST. PATIENT-LVL IV: CPT | Mod: PBBFAC,,, | Performed by: INTERNAL MEDICINE

## 2019-09-12 PROCEDURE — 99214 PR OFFICE/OUTPT VISIT, EST, LEVL IV, 30-39 MIN: ICD-10-PCS | Mod: S$PBB,,, | Performed by: INTERNAL MEDICINE

## 2019-09-12 PROCEDURE — 99214 OFFICE O/P EST MOD 30 MIN: CPT | Mod: PBBFAC | Performed by: INTERNAL MEDICINE

## 2019-09-12 NOTE — TELEPHONE ENCOUNTER
Call to pt to set appt with Dr Matias per Dr Najera's request for breast, rib and sternal pain. Pt scheduled 9/23/19 at 11:30 am.

## 2019-09-12 NOTE — Clinical Note
MRI scheduled in OCTF/u with Dr. Matias for breast pain/rib and sternal pain Cbc,cmp prior to f/u 3 mos

## 2019-09-12 NOTE — PROGRESS NOTES
Subjective:       Patient ID: Lou Jc is a 49 y.o. female.    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 with positive margin s/p re-excison on 11/16/18  S/p adjuvant XRT left breast under direction of Dr. Vázquez at [facility] on 1/25/2019           HPI: Pt is a 49 y.o. female seen today for f/u for   Left breast cancer. She was initially seen at [facility] ([facility] in [place], [place]). She had a BI-RADS 0 bilateral screening mammogram for architectural distortion and subsequent bilateral diagnostic mammogram and left breast ultrasound that were BI-RADS 4 and revealed focal asymmetry at the outer central position left breast She subsequently underwent ultrasound-guided core needle biopsy on 8/17/18 with pathology showing fragmented papilloma with no evidence of atypia or in-situ or invasive carcinoma. She was then seen by General Surgery at [facility] on 8/31/18 to discuss wire localization excisional biopsy of this intraductal papilloma, which ultimately occurred on 9/21/18. Final pathology from the excision biopsy upstaged to a 2.0 cm grade 2 ER+ (moderate) SC+ (moderate) ductal carcinoma in situ with focally positive medial margin with no evidence of invasive carcinoma. She subsequently underwent re-excison on 11/16/18. Final pathology showed LCIS but no residual DCIS. Margins uninvolved with closest margins are medial and inferior - 1mm, superior 6mm. She routinely performs self breast exams. She has not noted a change on breast exam. Patient denies nipple discharge. She reports she has not undergone annual routine screening mammograms. She reports history of previous breast bx in 2015- pathologically benign. She underwent [facility] testing with was negative .         She is followed by Rad/Onc  She has completed adjuvant XRT left breast to a dose of 5005cGy under direction of Dr. Vázquez at [facility] on  1/25/2019        She continues with diffuse breast tenderness    Her ribs and intercostal muscles remain  exquisitely tender   She is uncertain whether  pain began following surgical intervention or during RT     She occasionally takes OTC NSAIDS with minimal relief  She continues with mild hot flashes, sig relieved with prescribed Clonidine  No SOB/CP  Appetite and weight stable  No arthralgias        Mammo 4/23/2019    BI-RADS Category:   Overall: 2 - Benign      [facility], negative [facility]      Risk Factors/Breast History Age of Menarche: 10y.o. Age of Menopause: 48. LMP 2016. History of Hormonal Therapy: OCPs x 4 years. No HRT.Nulliparous Family History of Breast Ca: Maternal aunt (age 60s). Maternal aunt (age 70s). Paternal aunt (age 70s).   No Family hx of Ovarian Cancer. Other Family History: Father with pancreatic cancer.         Social History .She is a chronic smoker. She has smoked 1ppd x 25 yrs. She drinks 2 beers/week.            Past Medical History:   Diagnosis Date    Allergy     Breast cyst     DCIS (ductal carcinoma in situ)     Left    Endometriosis     Papilloma of breast     Reflux          Past Surgical History:   Procedure Laterality Date    BREAST BIOPSY Left     papilloma    BREAST CYST ASPIRATION Left     BREAST LUMPECTOMY      2 lumpectomy    diagnostic lap      LUMPECTOMY, BREAST-re-ecxision Left 11/16/2018    Performed by [facility] at [facility] 2ND FLR    LUMPECTOMY,BREAST Left 9/21/2018    [facility] at [facility] OR    TONSILLECTOMY         Review of Systems   Constitutional: Negative for appetite change, fatigue, fever and unexpected weight change.   HENT: Negative for mouth sores.    Eyes: Negative for visual disturbance.   Respiratory: Negative for cough and shortness of breath.    Cardiovascular: Negative for chest pain.   Gastrointestinal: Negative for abdominal pain and diarrhea.   Genitourinary: Negative for  "frequency.   Musculoskeletal: Negative for back pain.   Skin: Negative for rash.   Neurological: Negative for headaches.   Hematological: Negative for adenopathy.   Psychiatric/Behavioral: The patient is nervous/anxious.        Objective:        Vitals:    09/12/19 0844 09/12/19 0924   BP: 136/70 120/80   BP Location: Right arm Right arm   Patient Position: Sitting Sitting   BP Method: Medium (Automatic) Medium (Automatic)   Pulse: 78    Temp: 98.2 °F (36.8 °C)    TempSrc: Oral    SpO2: 99%    Weight: 63.6 kg (140 lb 3.4 oz)    Height: 4' 11" (1.499 m)        Physical Exam   Constitutional: She is oriented to person, place, and time. She appears well-developed and well-nourished.   HENT:   Head: Normocephalic.   Eyes: Conjunctivae and lids are normal. No scleral icterus.   Neck: Normal range of motion. Neck supple. No thyromegaly present.   Cardiovascular: Normal rate, regular rhythm and normal heart sounds.   No murmur heard.  Pulmonary/Chest: Breath sounds normal. She has no wheezes. She has no rales. Right breast exhibits tenderness. Right breast exhibits no mass, no nipple discharge and no skin change. Left breast exhibits tenderness. Left breast exhibits no mass, no nipple discharge and no skin change.   Lt breast- minor skin changes noted at radiation site       Abdominal: Soft. Bowel sounds are normal. She exhibits no distension and no mass. There is no hepatosplenomegaly. There is no tenderness. There is no rebound and no guarding.   Musculoskeletal: Normal range of motion. She exhibits no edema or tenderness.   Lymphadenopathy:     She has no cervical adenopathy.     She has no axillary adenopathy.        Right: No supraclavicular adenopathy present.        Left: No supraclavicular adenopathy present.   Neurological: She is alert and oriented to person, place, and time. No cranial nerve deficit. Coordination normal.   Skin: Skin is warm and dry. No ecchymosis, no petechiae and no rash noted. No erythema. "   Psychiatric: She has a normal mood and affect.         Lab Results   Component Value Date    WBC 7.85 09/10/2019    HGB 13.5 09/10/2019    HCT 42.7 09/10/2019    MCV 92 09/10/2019     09/10/2019     Results for STEFFANIE JIMENEZ (MRN [id number]) as of 1/10/2019 09:20   Ref. Range 12/6/2018 10:27   Estradiol Latest Ref Range: See Text pg/mL <10 (A)   Testosterone, Free Latest Units: pg/mL 1.8     FINAL PATHOLOGIC DIAGNOSIS 9/21/2018   Left breast, wire localized lumpectomy:  -Ductal carcinoma in situ (DCIS), Grade 2 (intermediate), focally involving the medial margin, present in 5  blocks with an estimated size of at least 20 mm (see cancer case summary below)  -Background breast tissue with fibrocystic change including dense fibrosis, papillomatosis, adenosis, cystic  dilatation, apocrine metaplasia, and fibroadenomatoid nodules  -Biopsy site changes  -No evidence of invasive carcinoma  Surgical pathology cancer case summary:  -Procedure: Excision (less than total mastectomy)  -Specimen laterality: Left  -Size (extent) of DCIS: Estimated size of DCIS: At least 20 mm: Number blocks with DCIS: 5  -Histologic type: Ductal carcinoma in situ  -Architectural patterns: Solid  -Nuclear grade: Grade 2 (intermediate)  -Necrosis: Not identified  -Margins: Medial margin focally positive for DCIS  -Regional lymph nodes: No lymph nodes submitted or found  -Pathologic stage classification (pTNM):  Primary tumor (pT): pTis (DCIS): Ductal carcinoma in situ  Regional lymph nodes (pN): pNX: Regional lymph nodes cannot be assessed  -Microcalcifications: Present in DCIS and nonneoplastic tissue     Hormone receptor results (performed with appropriate controls):  ER - Positive (moderate)  MN - Positive (moderate)           FINAL PATHOLOGIC DIAGNOSIS  11/16/2018    1. LEFT BREAST, RE-EXCISION:  No residual ductal carcinoma in-situ  Lobular carcinoma in-situ  Margins negative for neoplasia or malignancy (closest margins are medial  and inferior - 1mm, superior 6mm)  Atypical lobular hyperplasia  Intraductal papilloma  Negative for malignancy  Extensive previous procedure related changes present  (specimen has been submitted in it's entirety)  2. NEW ANTERIOR MARGIN, EXCISION:  Benign skin and subcutaneous tissue with extensive inflammation and procedure related changes  Negative for neoplasia or malignancy  (specimen has been submitted in its entirety)  Part1.  Immunostain E-Cadherin has been performed (with appropriate controls) on two sections #1C AND 1G and is  negative, supporting the above diagnosis.  Hormone receptors performed for LCIS (Part 1)  ER - Positive (90% of the tumor nuclei, moderate to strong)  DE - Positive (30% of the tumor nuclei, weak)  HER2 - Indeterminate (2+), specimen will be sent out for FISH analysis  Ki67 - 5%, positive tumor nuclei  [id number],ER,PGR            Bone Density  1/18/2018   Osteopenia.  Ten year probability of major osteoporotic fracture is 3.9%, and hip fracture is 0.2%.      Results for STEFFANIE JIMENEZ (MRN [id number]) as of 6/13/2019 08:27   Ref. Range 4/8/2019 10:08 6/10/2019 08:19   Vit D, 25-Hydroxy Latest Ref Range: 30 - 96 ng/mL 45 52       Assessment:       1. Ductal carcinoma in situ (DCIS) of left breast    2. History of partial mastectomy of left breast    3. Encounter for monitoring aromatase inhibitor therapy    4. Hot flashes related to aromatase inhibitor therapy    5. Breast pain        Plan:       1-5    Patient is a 49 y.o. female with Stage 0 pTis N0 M0 grade 2 ER + DE + DCIS of the left breast status post left partial mastectomy with focally positive medial margin s/p re-excison on 11/16/18. Final pathology showed LCIS but no residual DCIS with negative with closest margins are medial and inferior - 1mm   S/p  adjuvant XRT  under the direction of Rad/Onc at [facility] completed 1/25/2019     [employer] [facility], negative Integrated [facility]ACAnalysis with [facility]       Bone density  1/10/2019  reveals osteopenia  Cont Ca and Vit D   Consider bisphosphonate therapy   Plan bone density every 1  To 2 years   Continue weight bearing exercises  Avoidance of smoking    Continue monthly SBE  Plan  bilateral mammogram alternating with MRI surveillance moving forward.    She will continue with monthly self-breast exams  Follow-up Mammo BI-RADS Category:  Overall: 2 - Benign  MRI planned 10/2019     We previously discussed lifestyle and weight-bearing exercises and alcohol in moderation recommended.   We will continue to monitor her bone health as well.  She will follow-up every 3 months for years 1-3       Etiology of continued pain in breast /ribs/sternum unclear  Pt has been evaluated by breast surgery in past  Plan close follow-up with Breast surgery for any recs  Pt has been prescribed Vit D   Plan trial hold of AI and eval for any improvement in sx's ( although suspicion that AI contributing factor low) until f/u with Dr. Matias    Cont  Clonidine 0.1mg po bid prn hot flashes     MRI planned 10/2019       She will follow-up in 3  mos    Follow up with Dr. Matias  .    Advance Care Planning     Power of   I initiated the process of advance care planning today and explained the importance of this process to the patient.  I introduced the concept of advance directives to the patient, as well. Then the patient received detailed information about the importance of designating a Health Care Power of  (HCPOA). She was also instructed to communicate with this person about their wishes for future healthcare, should she become sick and lose decision-making capacity. The patient has not previously appointed a HCPOA. After our discussion, the patient has decided to complete a HCPOA and has appointed her brother  Xiang Trinidad [phone] and NAME:  Leon Lay [phone]  I spent a total time of 16  minutes discussing this issue with the patient.    Advance Care Planning     Living  Will  During this visit, I engaged the patient in the advance care planning process.  The patient and I reviewed the role for advance directives and their purpose in directing future healthcare if the patient's unable to speak for him/herself.  At this point in time, the patient does have full decision-making capacity.  We discussed different extreme health states that she could experience, and reviewed what kind of medical care she would want in those situations.  The patient communicated that if she were comatose and had little chance of a meaningful recovery, she would not want machines/life-sustaining treatments used.    The patient has completed a living will to reflect these preferences.  The patient  has already designated a healthcare power of  to make decisions on her behalf.   I spent a total of  16  minutes engaging the patient in this advance care planning discussion.               Cc: MD Maryjo Pagan MD

## 2019-09-23 ENCOUNTER — OFFICE VISIT (OUTPATIENT)
Dept: SURGERY | Facility: CLINIC | Age: 49
End: 2019-09-23
Payer: MEDICAID

## 2019-09-23 VITALS — SYSTOLIC BLOOD PRESSURE: 109 MMHG | TEMPERATURE: 98 F | DIASTOLIC BLOOD PRESSURE: 67 MMHG | HEART RATE: 74 BPM

## 2019-09-23 DIAGNOSIS — D05.12 DUCTAL CARCINOMA IN SITU (DCIS) OF LEFT BREAST: Primary | ICD-10-CM

## 2019-09-23 PROCEDURE — 99999 PR PBB SHADOW E&M-EST. PATIENT-LVL II: CPT | Mod: PBBFAC,,, | Performed by: SURGERY

## 2019-09-23 PROCEDURE — 99213 PR OFFICE/OUTPT VISIT, EST, LEVL III, 20-29 MIN: ICD-10-PCS | Mod: S$PBB,,, | Performed by: SURGERY

## 2019-09-23 PROCEDURE — 99212 OFFICE O/P EST SF 10 MIN: CPT | Mod: PBBFAC,PO | Performed by: SURGERY

## 2019-09-23 PROCEDURE — 99213 OFFICE O/P EST LOW 20 MIN: CPT | Mod: S$PBB,,, | Performed by: SURGERY

## 2019-09-23 PROCEDURE — 99999 PR PBB SHADOW E&M-EST. PATIENT-LVL II: ICD-10-PCS | Mod: PBBFAC,,, | Performed by: SURGERY

## 2019-09-23 NOTE — PROGRESS NOTES
REFERRING PHYSICIAN:  Parth Wolfe MD    MEDICAL ONCOLOGIST:     Dr. Najera  RADIATION ONCOLOGIST:    Dr. Avalos at [facility]    DIAGNOSIS:    This is a 49 y.o. female with a stage pTis N0 M0 grade 2 ER + NV + DCIS of the left breast.    TREATMENT SUMMARY:  The patient is status post left partial mastectomy performed at [facility] with focally positive medial margin and now re-excison on 11/16/18.  Final pathology showed LCIS but no residual DCIS.  Radiation Oncology: Dr. Avalos  Medical Oncology: Dr. Najera    INTERVAL HISTORY:   Lou Jc comes in for a follow up.  She denies fever, chills, chest pain or shortness of breath.  Had pain related likely to radiation; seen by Dr. Davey on 1/11/19 (was on third week of radiation treatment). Was exhibiting some skin changes, had minor breast firmness and tenderness, mild skin darkening. Ribs and intercostal muscles were exquisitely tender. Adjuvant XRT now complete (Dr. Avalos). Still states that she has post radiation pain along ribs (on both sides and along sternum). Does not take pain relievers, has not tried heat/cold to help with pain. Continues to follow with Med Onc (Dr. Najera); on Arimidex 1 mg po qd, Aromatase inhibitor (x 5 years), Clonidine 0.1mg po bid prn hot flashes.  Taking a break from the AI per Dr. Najera.    MEDICATIONS:  Current Outpatient Medications   Medication Sig Dispense Refill    anastrozole (ARIMIDEX) 1 mg Tab Take 1 tablet (1 mg total) by mouth once daily. 30 tablet 2    aspirin-acetaminophen-caffeine 250-250-65 mg (EXCEDRIN MIGRAINE) 250-250-65 mg per tablet Take 1 tablet by mouth every 6 (six) hours as needed for Pain.  0    b complex vitamins tablet Take 1 tablet by mouth.      calcium carbonate (OS-EMERALD) 500 mg calcium (1,250 mg) tablet Take 1 tablet (500 mg total) by mouth once daily. 60 tablet 1    clonazePAM (KLONOPIN) 1 MG tablet TAKE 1 TABLET BY MOUTH IN THE EVENING 30 tablet 1    cloNIDine (CATAPRES) 0.1 MG tablet  Take 1 tablet (0.1 mg total) by mouth 2 (two) times daily as needed (hot flashes). 60 tablet 11    ergocalciferol (ERGOCALCIFEROL) 50,000 unit Cap Take 1 capsule (50,000 Units total) by mouth every 7 days. 12 capsule 3    loratadine (CLARITIN) 10 mg tablet Take 1 tablet (10 mg total) by mouth once daily. 30 tablet 3    naproxen (NAPROSYN) 500 MG tablet Take 1 tablet (500 mg total) by mouth 2 (two) times daily with meals. 90 tablet 1    omeprazole (PRILOSEC) 20 MG capsule Take 1 capsule (20 mg total) by mouth once daily. 30 capsule 2    ondansetron (ZOFRAN) 4 MG tablet Take 1 tablet (4 mg total) by mouth every 8 (eight) hours as needed for Nausea. 60 tablet 2    pantoprazole (PROTONIX) 40 MG tablet Take 1 tablet (40 mg total) by mouth once daily. 30 tablet 1     No current facility-administered medications for this visit.        ALLERGIES:   Review of patient's allergies indicates:  No Known Allergies    PHYSICAL EXAMINATION:   /67 (BP Location: Left arm, Patient Position: Sitting, BP Method: Medium (Automatic))   Pulse 74   Temp 98 °F (36.7 °C) (Oral)   LMP 06/27/2016       Physical Exam   Constitutional: She appears well-developed and well-nourished.   HENT:   Head: Normocephalic.   Eyes: No scleral icterus.   Neck: Neck supple. No tracheal deviation present.   Cardiovascular: Normal rate and regular rhythm.    Pulmonary/Chest: Breath sounds normal. No respiratory distress. Right breast exhibits no inverted nipple, no mass, no nipple discharge and no skin change. Left breast exhibits no inverted nipple, no mass, no nipple discharge and no skin change.   Abdominal: Soft. She exhibits no mass. There is no tenderness.   Musculoskeletal: She exhibits no edema.   Lymphadenopathy:     She has no cervical adenopathy.   Neurological: She is alert.   Skin: No rash noted. No erythema.     Psychiatric: She has a normal mood and affect.       Mammogram/Ultrasound (4/23/19):  Findings:  This procedure was  performed using tomosynthesis.  Computer-aided detection was utilized in the interpretation of this examination.  The breasts are extremely dense, which lowers the sensitivity of mammography.     In the left breast upper outer quadrant posterior depth, there are milk of calcium type calcifications, unchanged since 2016 and benign.      Expected benign post treatment changes in the left breast, including lumpectomy scar. No suspicious mammographic finding in the left breast.      In the right breast superior region middle depth, there is an oval asymmetry visible in the lateral projections. This localizes to the right breast upper outer quadrant on the 3D tomosynthesis images.      Limited right breast ultrasound:   In the right breast upper outer quadrant 10:00 o'clock axis 7 cm from the nipple, there is an oval complicated cyst measuring 0.6 cm. This corresponds to the right breast mammographic finding, benign.      No suspicious finding. No right axillary adenopathy.      Impression:  Bilateral  There is no mammographic or sonographic evidence of malignancy.     BI-RADS Category:   Overall: 2 - Benign  Recommendation:  Routine screening mammogram in 1 year is recommended.       IMPRESSION:   The patient has had an uneventful postoperative course.    PLAN:   1. return in 6 months for a follow up office visit and breast exam  2. Recommended aleve and heat to the area to help alleviate the pain.  If no improvement, will send Voltaren gel.  I advised her to restart anastrazole in the next two weeks.  3. The patient is advised in continued exam of the breast chest wall and to report to this office sooner should she note any areas of abnormality or concern.   4.  She is followed by med onc (Dr. Najera) and has completed radiation treatment with  rad onc (Dr. Avalos)

## 2019-10-04 NOTE — H&P (VIEW-ONLY)
Breast Surgery  [facility]  Department of Surgery      REFERRING PROVIDER: No referring provider defined for this encounter.    Chief Complaint: Post-op Evaluation (Post-Op Discuss Re-exision or Mastectomy.)      Subjective:      Patient ID: Lou Jc is a 48 y.o. female who presents with newly diagnosed LEFT breast cancer. She was initially seen at OSH ([facility] in [place], [place]). She had a BI-RADS 0 bilateral screening mammogram for architectural distortion and subsequent bilateral diagnostic mammogram and left breast ultrasound that were BI-RADS 4. She subsequently underwent ultrasound-guided core needle biopsy on 8/17/18 with pathology showing fragmented papilloma with no evidence of atypia or in-situ or invasive carcinoma. She was then seen by General Surgery at OSH on 8/31/18 to discuss wire localization excisional biopsy of this intraductal papilloma, which ultimately occurred on 9/21/18. 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 She was seen back in clinic at OSH to discuss surgical options on 10/8/18.     Patient does routinely do self breast exams. Patient has not noted a change on breast exam. Patient denies nipple discharge. Patient denies previous breast biopsy (prior to current events). Patient denies a personal history of breast cancer (prior to current events).    Findings at that time were the following:   Tumor size: 2.0 cm (focally positive margin - medial)  Tumor stgstrstastdstest:st st1st Estrogen Receptor: +   Progesterone Receptor: +   Her-2 ryan: N/A   Lymph node status: Clinically negative  Lymphatic invasion: N/A     Risk Factors/Breast History  Age of Menarche: 10  Age of Menopause: 48  History of Hormonal Therapy: OCPs x 4 years. No HRT.  Number of Pregnancies: Nulliparous  Age of First Birth: N/A  Lactational History: N/A  Mammogram History: Annual or every  2 years for > 10 years  History of Chest Radiation: No  History of Breast Bx: Yes (current events)  History of Breast Ca: Yes (current events)  Family History of Breast Ca: Maternal aunt (age 60s). Maternal aunt (age 70s). Paternal aunt (age 70s).  Family History of Ovarian Ca: No  Other Family History: Father with pancreatic cancer.    Current smoker. 1 PPD x 25 years.  No antiplatelet or anticoagulant agents.    Past Medical History:   Diagnosis Date    Allergy     Breast cyst     DCIS (ductal carcinoma in situ)     Left    Papilloma of breast     Reflux      Past Surgical History:   Procedure Laterality Date    BREAST BIOPSY Left     papilloma    BREAST CYST ASPIRATION Left     diagnostic lap      LUMPECTOMY,BREAST Left 9/21/2018    Performed by Prabha Matias [facility] at [facility] OR    TONSILLECTOMY       Current Outpatient Medications on File Prior to Visit   Medication Sig Dispense Refill    ascorbic acid, vitamin C, (VITAMIN C) 100 MG tablet Take 100 mg by mouth once daily.      b complex vitamins tablet Take 1 tablet by mouth once daily.      clonazePAM (KLONOPIN) 1 MG tablet Take 1 tablet (1 mg total) by mouth every evening. 30 tablet 0    ketoconazole (NIZORAL) 2 % cream Apply topically once daily. 30 g 0    ketoconazole (NIZORAL) 200 mg Tab Take 200 mg by mouth once daily.      loratadine (CLARITIN) 10 mg tablet Take 1 tablet (10 mg total) by mouth once daily. 30 tablet 3    naproxen (NAPROSYN) 500 MG tablet Take 1 tablet (500 mg total) by mouth 2 (two) times daily with meals. 90 tablet 1    omeprazole (PRILOSEC) 20 MG capsule Take 20 mg by mouth once daily.      diclofenac sodium (VOLTAREN) 1 % Gel Apply 2 g topically 4 (four) times daily. 100 g 0    ergocalciferol (ERGOCALCIFEROL) 50,000 unit Cap Take 1 capsule (50,000 Units total) by mouth every 7 days. 12 capsule 3    [DISCONTINUED] HYDROcodone-acetaminophen (NORCO) 5-325 mg per tablet Take 1 tablet by mouth every 6 (six) hours as needed  for Pain. 15 tablet 0    [DISCONTINUED] mupirocin (BACTROBAN) 2 % ointment Apply topically 2 (two) times daily. AAA 20 g 1    [DISCONTINUED] oxyCODONE-acetaminophen (PERCOCET)  mg per tablet Take 1 tablet by mouth every 6 (six) hours as needed for Pain.       No current facility-administered medications on file prior to visit.      Social History     Socioeconomic History    Marital status:      Spouse name: Not on file    Number of children: Not on file    Years of education: unknown    Highest education level: Not on file   Social Needs    Financial resource strain: Not on file    Food insecurity - worry: Not on file    Food insecurity - inability: Not on file    Transportation needs - medical: Not on file    Transportation needs - non-medical: Not on file   Occupational History    Not on file   Tobacco Use    Smoking status: Former Smoker     Packs/day: 0.50     Years: 20.00     Pack years: 10.00     Types: Cigarettes     Last attempt to quit: 2017     Years since quittin.1    Smokeless tobacco: Never Used   Substance and Sexual Activity    Alcohol use: Yes     Alcohol/week: 1.2 oz     Types: 2 Cans of beer per week    Drug use: Yes     Types: Marijuana    Sexual activity: Yes     Partners: Male     Birth control/protection: None   Other Topics Concern    Are you pregnant or think you may be? Not Asked    Breast-feeding Not Asked   Social History Narrative    Not on file     Family History   Problem Relation Age of Onset    Diabetes Mother     Heart disease Mother     COPD Mother     Hypertension Mother     Kidney disease Mother     Cancer Mother     Throat cancer Mother     Depression Mother     Hearing loss Mother     Heart disease Father     Cancer Father     Diabetes Father     Bone cancer Father     Breast cancer Maternal Aunt     Breast cancer Paternal Aunt         Review of Systems   Constitutional: Negative for activity change, chills, fatigue and  "fever.   HENT: Negative for congestion, rhinorrhea and sore throat.    Eyes: Negative for visual disturbance.   Respiratory: Negative for cough, shortness of breath and wheezing.    Cardiovascular: Negative for chest pain, palpitations and leg swelling.   Gastrointestinal: Negative for abdominal distention, abdominal pain, constipation, diarrhea, nausea and vomiting.   Genitourinary: Negative for dysuria, frequency and hematuria.   Musculoskeletal: Negative for arthralgias and myalgias.   Skin: Negative for color change, rash and wound.   Neurological: Negative for syncope, weakness and numbness.   Hematological: Does not bruise/bleed easily.   Psychiatric/Behavioral: Negative for behavioral problems and confusion.        Objective:   /78 (BP Location: Right arm, Patient Position: Sitting, BP Method: Medium (Automatic))   Pulse 80   Temp 98.1 °F (36.7 °C) (Oral)   Ht 4' 11" (1.499 m)   Wt 59.9 kg (132 lb)   LMP 06/27/2016   BMI 26.66 kg/m²     Physical Exam   Vitals reviewed.  Constitutional: She is oriented to person, place, and time. She appears well-developed and well-nourished.   Tearful throughout visit today   HENT:   Head: Normocephalic and atraumatic.   Eyes: Conjunctivae and EOM are normal.   Cardiovascular: Normal rate, regular rhythm and intact distal pulses.    Pulmonary/Chest: Effort normal. No respiratory distress. She has no wheezes. Right breast exhibits no inverted nipple, no mass, no nipple discharge, no skin change and no tenderness. Left breast exhibits no inverted nipple, no mass, no nipple discharge, no skin change and no tenderness. Breasts are symmetrical. There is no breast swelling.       Abdominal: Soft. She exhibits no distension. There is no tenderness.   Genitourinary: No breast bleeding.   Musculoskeletal: Normal range of motion. She exhibits no edema or deformity.   Neurological: She is alert and oriented to person, place, and time.   Skin: Skin is warm and dry. No rash " noted. She is not diaphoretic. No erythema.     Psychiatric: She has a normal mood and affect. Her behavior is normal.       Radiology review: Images personally reviewed by me in the clinic.   Bilateral Diagnostic Mammogram (8/10/18): BI-RADS 4. The breasts are extremely dense, which lowers the sensitivity of mammography. There is a focal asymmetry seen in the outer central region of the left breast. May correspond to a complicated cyst noted by ultrasound assessment. There are coarse heterogeneous calcifications seen in the left breast. Compared to the previous study, there are no significant changes. There is an asymmetry seen in the central region of the left breast in the middle depth on the MLO view. Does not persist on spot compression view. There is architectural distortion seen in the central region of the right breast in the posterior depth on the MLO view. Does not persist upon additional imaging. There are multiple similar lymph nodes seen in both axillae. Compared to the previous study, there are no significant changes.    US Breast Left Limited (8/10/18): BI-RADS 4.There is a 9 x 8 x 3 mm oval complicated cyst with circumscribed margins seen in the left breast at 3 o'clock. Contains non-dependent debris or tissue. There are multiple similar oval simple cysts with circumscribed margins seen in the left breast at 3 o'clock.     Bilateral Screening Mammogram (7/18/18): BI-RADS 0. The breasts are extremely dense, which lowers the sensitivity of mammography. There is an asymmetry seen in the central region of the left breast in the posterior depth on the MLO view. This is a new finding. There is a focal asymmetry seen in the outer central region of the left breast in the middle depth. This is a new finding. There are coarse heterogeneous calcifications seen in the left breast. Compared to the previous study, there are no significant changes. There is architectural distortion seen in the central region of the  right breast in the posterior depth on the MLO view. This is a new finding. There are multiple similar lymph nodes seen in both axillae. Compared to the previous study, there are no significant changes.     Assessment:       1. Ductal carcinoma in situ (DCIS) of left breast        Plan:     Options for management were discussed with the patient and her family. We reviewed the existing data noting the equivalency of breast conserving surgery with radiation therapy and mastectomy. We also reviewed the guidelines of the National Comprehensive Cancer Network for Stage 0 breast carcinoma. We discussed the need for lumpectomy margins to be negative for carcinoma, the necessity for postoperative radiation therapy after breast conservation in most cases, the possibility of a failed or false negative sentinel lymph node biopsy and the potential need for complete lymphadenectomy for a failed or positive sentinel lymph node biopsy were fully discussed. In the setting of mastectomy, delayed or immediate reconstruction options are available and were discussed.     In the setting of lumpectomy, radiation therapy would be recommended majority of the time. The duration and treatment side effects were discussed with the patient. This will coordinated with the radiation oncologist pending final pathology.    We also discussed the role of systemic therapy in the treatment of early stage breast cancer. We discussed that this is based on tumor biology and long status and will be determined based on final pathology. We discussed that if the cancer is hormone positive, endocrine therapy would be recommended in most cases and its use can reduce the risk of recurrence as well as improve survival. Side effects of treatment were briefly discussed. We also discussed the potential role for chemotherapy based on a number of factors such as tumor phenotype (ER+ vs. triple negative vs. Rag9ydc+) and this would be determined in coordination with  the medical oncologist.    The patient, in consultation with her family, has elected to consider her options further.   She is on the fence sbout re-excision lumpectomy vs mastectomy with reconstruction.  She is very nervous regardless. The operative risks of bleeding, infection, recurrence, scarring, and anesthetic complications and the possibility of requiring further surgery were all noted.  I offered her surgery to be at [facility] or [facility], or even [facility] if she prefers.  We will refer her for Genetics as well given her above history.    Surgery scheduled. Follow-up in clinic roughly 14 days after surgery.     Patient was educated on breast cancer, receptors, wire localization lumpectomy, mastectomy, sentinel lymph node mapping and biopsy, axillary lymph node dissection, reconstruction, breast prosthesis with post-mastectomy bra and radiation therapy. Patient was given patient information binder including [facility] breast cancer treatment brochure. All her questions were answered.    Total time spent with the patient: 45 minutes. 30 minutes of face to face consultation and 15 minutes of chart review and coordination of care.         [Wake up at night to urinate  How many times?  ___] : wakes up to urinate [unfilled] times during the night [Urine Infection (bladder/kidney)] : bladder/kidney infection [Negative] : Heme/Lymph

## 2019-10-07 ENCOUNTER — TELEPHONE (OUTPATIENT)
Dept: SPORTS MEDICINE | Facility: CLINIC | Age: 49
End: 2019-10-07

## 2019-10-07 ENCOUNTER — TELEPHONE (OUTPATIENT)
Dept: SURGERY | Facility: CLINIC | Age: 49
End: 2019-10-07

## 2019-10-07 NOTE — TELEPHONE ENCOUNTER
----- Message from Sindy Yeh sent at 10/7/2019  2:05 PM CDT -----  Contact: self [phone]  Pt states she feels like she needs to be seen today states she cant take the shoulder pain anymore please call back to discuss

## 2019-10-07 NOTE — TELEPHONE ENCOUNTER
----- Message from Sindy Yeh sent at 10/7/2019  1:51 PM CDT -----  Contact: self [phone]  Pt states she would like to speak with nurse states she have a big lump in her breast and they think its from the medication please call back to discuss

## 2019-10-07 NOTE — TELEPHONE ENCOUNTER
LVM informing patient to see PCP or go to the ER if she needs same day.  Next available is not until 1/17/19.

## 2019-10-09 ENCOUNTER — TELEPHONE (OUTPATIENT)
Dept: SURGERY | Facility: CLINIC | Age: 49
End: 2019-10-09

## 2019-10-09 NOTE — TELEPHONE ENCOUNTER
----- Message from Jo Barker sent at 10/9/2019  1:26 PM CDT -----  Contact: pt @ [phone]  Caller is returning a missed call, please call.

## 2019-10-15 ENCOUNTER — TELEPHONE (OUTPATIENT)
Dept: SURGERY | Facility: CLINIC | Age: 49
End: 2019-10-15

## 2019-10-15 NOTE — TELEPHONE ENCOUNTER
----- Message from Felicia Callejas sent at 10/15/2019 11:26 AM CDT -----  Contact: pt [phone]  Please call pt.  If no answer please leave detail message.  The is regarding medication and breast pain.   Knots went away but pain has gotten worse.

## 2019-10-15 NOTE — TELEPHONE ENCOUNTER
Spoke with pt. States the knots in her breast are now gone, but pain is worse. Wants to know the next step. Pt was taken off her anastrazole to see if the pain would improve. It has not and wants to what can be done for the pain.Informed pt that I would speak with Dr Matias and see what the plan will be.

## 2019-10-17 ENCOUNTER — TELEPHONE (OUTPATIENT)
Dept: HEMATOLOGY/ONCOLOGY | Facility: CLINIC | Age: 49
End: 2019-10-17

## 2019-10-17 NOTE — TELEPHONE ENCOUNTER
The patient called stating that she has been off of her Arimidex for awhile and that Dr Matias told her to contact you to see when she should restart        The patient was notified

## 2019-10-24 ENCOUNTER — HOSPITAL ENCOUNTER (OUTPATIENT)
Dept: RADIOLOGY | Facility: HOSPITAL | Age: 49
Discharge: HOME | End: 2019-10-24
Attending: SURGERY
Payer: MEDICAID

## 2019-10-24 DIAGNOSIS — Z91.89 AT HIGH RISK FOR BREAST CANCER: ICD-10-CM

## 2019-10-24 DIAGNOSIS — Z86.000 HISTORY OF DUCTAL CARCINOMA IN SITU (DCIS) OF BREAST: ICD-10-CM

## 2019-10-24 PROCEDURE — 77049 MRI BREAST W/WO CONTRAST, W/CAD, BILATERAL: ICD-10-PCS | Mod: 26,,, | Performed by: RADIOLOGY

## 2019-10-24 PROCEDURE — A9577 INJ MULTIHANCE: HCPCS | Performed by: SURGERY

## 2019-10-24 PROCEDURE — 77049 MRI BREAST C-+ W/CAD BI: CPT | Mod: 26,,, | Performed by: RADIOLOGY

## 2019-10-24 PROCEDURE — 25500020 PHARM REV CODE 255: Performed by: SURGERY

## 2019-10-24 PROCEDURE — 77049 MRI BREAST C-+ W/CAD BI: CPT | Mod: TC

## 2019-10-24 RX ADMIN — GADOBENATE DIMEGLUMINE 15 ML: 529 INJECTION, SOLUTION INTRAVENOUS at 09:10

## 2019-10-30 ENCOUNTER — TELEPHONE (OUTPATIENT)
Dept: SURGERY | Facility: CLINIC | Age: 49
End: 2019-10-30

## 2019-10-30 NOTE — TELEPHONE ENCOUNTER
Return call to pt. Informed that breast MRI was without abnormality. Pt to continue with mammogram as recommended in April 2020.

## 2019-10-30 NOTE — TELEPHONE ENCOUNTER
----- Message from Jo Barker sent at 10/30/2019  9:12 AM CDT -----  Contact: pt @ [phone]  Calling to speak with someone in Dr. Matias's office regarding her MRI results. Please call.

## 2019-11-18 ENCOUNTER — OFFICE VISIT (OUTPATIENT)
Dept: SURGERY | Facility: CLINIC | Age: 49
End: 2019-11-18
Payer: MEDICAID

## 2019-11-18 DIAGNOSIS — D05.12 DUCTAL CARCINOMA IN SITU (DCIS) OF LEFT BREAST: Primary | ICD-10-CM

## 2019-11-18 PROCEDURE — 99999 PR PBB SHADOW E&M-EST. PATIENT-LVL I: CPT | Mod: PBBFAC,,, | Performed by: SURGERY

## 2019-11-18 PROCEDURE — 99999 PR PBB SHADOW E&M-EST. PATIENT-LVL I: ICD-10-PCS | Mod: PBBFAC,,, | Performed by: SURGERY

## 2019-11-18 PROCEDURE — 99211 OFF/OP EST MAY X REQ PHY/QHP: CPT | Mod: PBBFAC,PO | Performed by: SURGERY

## 2019-11-18 PROCEDURE — 99213 PR OFFICE/OUTPT VISIT, EST, LEVL III, 20-29 MIN: ICD-10-PCS | Mod: S$PBB,,, | Performed by: SURGERY

## 2019-11-18 PROCEDURE — 99213 OFFICE O/P EST LOW 20 MIN: CPT | Mod: S$PBB,,, | Performed by: SURGERY

## 2019-11-18 NOTE — PROGRESS NOTES
REFERRING PHYSICIAN:  Parth Wolfe MD    MEDICAL ONCOLOGIST:     Dr. Najera  RADIATION ONCOLOGIST:    Dr. Avalos at [facility]    DIAGNOSIS:    This is a 49 y.o. female with a stage pTis N0 M0 grade 2 ER + MS + DCIS of the left breast.    TREATMENT SUMMARY:  The patient is status post left partial mastectomy performed at [facility] with focally positive medial margin and now re-excison on 11/16/18.  Final pathology showed LCIS but no residual DCIS.  Radiation Oncology: Dr. Avalos  Medical Oncology: Dr. Najera    INTERVAL HISTORY:   Lou Jc comes in for a follow up.  She denies fever, chills, chest pain or shortness of breath.  Had pain related likely to radiation; seen by Dr. Davey on 1/11/19 (was on third week of radiation treatment). Was exhibiting some skin changes, had minor breast firmness and tenderness, mild skin darkening. Ribs and intercostal muscles were exquisitely tender. Adjuvant XRT now complete (Dr. Avalos). Still states that she has post radiation pain along ribs (on both sides and along sternum). This has improved considerably since her last visit.   Continues to follow with Med Onc (Dr. Najera); on Arimidex 1 mg po qd, Aromatase inhibitor (x 5 years), Clonidine 0.1mg po bid prn hot flashes.    MEDICATIONS:  Current Outpatient Medications   Medication Sig Dispense Refill    anastrozole (ARIMIDEX) 1 mg Tab Take 1 tablet (1 mg total) by mouth once daily. (Patient not taking: Reported on 9/23/2019.) 30 tablet 2    aspirin-acetaminophen-caffeine 250-250-65 mg (EXCEDRIN MIGRAINE) 250-250-65 mg per tablet Take 1 tablet by mouth every 6 (six) hours as needed for Pain. (Patient not taking: Reported on 9/23/2019)  0    b complex vitamins tablet Take 1 tablet by mouth.      calcium carbonate (OS-EMERALD) 500 mg calcium (1,250 mg) tablet Take 1 tablet (500 mg total) by mouth once daily. 60 tablet 1    clonazePAM (KLONOPIN) 1 MG tablet TAKE 1 TABLET BY MOUTH IN THE EVENING (Patient not taking:  Reported on 9/23/2019) 30 tablet 1    cloNIDine (CATAPRES) 0.1 MG tablet Take 1 tablet (0.1 mg total) by mouth 2 (two) times daily as needed (hot flashes). 60 tablet 11    ergocalciferol (ERGOCALCIFEROL) 50,000 unit Cap Take 1 capsule (50,000 Units total) by mouth every 7 days. 12 capsule 3    loratadine (CLARITIN) 10 mg tablet Take 1 tablet (10 mg total) by mouth once daily. 30 tablet 3    naproxen (NAPROSYN) 500 MG tablet TAKE 1 TABLET BY MOUTH TWICE DAILY WITH MEALS 90 tablet 1    omeprazole (PRILOSEC) 20 MG capsule Take 1 capsule (20 mg total) by mouth once daily. 30 capsule 2    ondansetron (ZOFRAN) 4 MG tablet Take 1 tablet (4 mg total) by mouth every 8 (eight) hours as needed for Nausea. 60 tablet 2    pantoprazole (PROTONIX) 40 MG tablet Take 1 tablet (40 mg total) by mouth once daily. 30 tablet 1     No current facility-administered medications for this visit.        ALLERGIES:   Review of patient's allergies indicates:  No Known Allergies    PHYSICAL EXAMINATION:     [facility] 06/27/2016       Physical Exam   Constitutional: She appears well-developed and well-nourished.   HENT:   Head: Normocephalic.   Eyes: No scleral icterus.   Neck: Neck supple. No tracheal deviation present.   Cardiovascular: Normal rate and regular rhythm.    Pulmonary/Chest: Breath sounds normal. No respiratory distress. Right breast exhibits no inverted nipple, no mass, no nipple discharge and no skin change. Left breast exhibits no inverted nipple, no mass, no nipple discharge and no skin change.       Abdominal: Soft. She exhibits no mass. There is no tenderness.   Musculoskeletal: She exhibits no edema.   Lymphadenopathy:     She has no cervical adenopathy.   Neurological: She is alert.   Skin: No rash noted. No erythema.     Psychiatric: She has a normal mood and affect.       Mammogram/Ultrasound (4/23/19):  Findings:  This procedure was performed using tomosynthesis.  Computer-aided detection was utilized in the  interpretation of this examination.  The breasts are extremely dense, which lowers the sensitivity of mammography.     In the left breast upper outer quadrant posterior depth, there are milk of calcium type calcifications, unchanged since 2016 and benign.      Expected benign post treatment changes in the left breast, including lumpectomy scar. No suspicious mammographic finding in the left breast.      In the right breast superior region middle depth, there is an oval asymmetry visible in the lateral projections. This localizes to the right breast upper outer quadrant on the 3D tomosynthesis images.      Limited right breast ultrasound:   In the right breast upper outer quadrant 10:00 o'clock axis 7 cm from the nipple, there is an oval complicated cyst measuring 0.6 cm. This corresponds to the right breast mammographic finding, benign.      No suspicious finding. No right axillary adenopathy.      Impression:  Bilateral  There is no mammographic or sonographic evidence of malignancy.     BI-RADS Category:   Overall: 2 - Benign  Recommendation:  Routine screening mammogram in 1 year is recommended.       MRI 11/2019:  Findings:  The breasts have heterogeneous fibroglandular tissue. The background parenchymal enhancement is minimal and symmetric.      Left  There are post-surgical findings from a previous lumpectomy seen in the left breast at 6 o'clock in the anterior depth. There has been no interval development of a suspicious mass or abnormal enhancement.      Right  There is no evidence of suspicious masses, abnormal enhancement, or other abnormal findings in the right breast.      No enlarged axillary or internal mammary lymph nodes in either breast.      Nonenhancing T2 hyperintense lesion at the hepatic dome, most consistent with a hepatic cyst.     Trace right pleural effusion.     Impression:  Bilateral  There is no MR evidence of malignancy.     BI-RADS Category:   Overall: 2 -  Benign     Recommendation:  Return to annual screening mammogram schedule is recommended    IMPRESSION:   The patient has had an uneventful postoperative course.    PLAN:   1. return in 6 months for a follow up office visit and breast exam with BILL  2. She may not need annual MRI, depending on her mammography.  She does have extremely dense breasts on her last assessment and we discussed the benefit of MRI for her.  Radiology recs are for her to resume annual mammography.  With history of breast cancer and her being over 50 now, would consider MMG only.  If her breasts remain extremely dense, could consider resuming MRI screening.  3. The patient is advised in continued exam of the breast chest wall and to report to this office sooner should she note any areas of abnormality or concern.   4.  She is followed by med onc (Dr. Najera) and has completed radiation treatment with  rad onc (Dr. Avalos)

## 2019-11-19 DIAGNOSIS — D05.12 DUCTAL CARCINOMA IN SITU (DCIS) OF LEFT BREAST: Primary | ICD-10-CM

## 2019-12-09 ENCOUNTER — LAB VISIT (OUTPATIENT)
Dept: LAB | Facility: HOSPITAL | Age: 49
End: 2019-12-09
Attending: INTERNAL MEDICINE
Payer: MEDICAID

## 2019-12-09 DIAGNOSIS — Z51.81 ENCOUNTER FOR MONITORING AROMATASE INHIBITOR THERAPY: ICD-10-CM

## 2019-12-09 DIAGNOSIS — T45.1X5A HOT FLASHES RELATED TO AROMATASE INHIBITOR THERAPY: ICD-10-CM

## 2019-12-09 DIAGNOSIS — R23.2 HOT FLASHES RELATED TO AROMATASE INHIBITOR THERAPY: ICD-10-CM

## 2019-12-09 DIAGNOSIS — Z90.12 HISTORY OF PARTIAL MASTECTOMY OF LEFT BREAST: ICD-10-CM

## 2019-12-09 DIAGNOSIS — Z79.811 ENCOUNTER FOR MONITORING AROMATASE INHIBITOR THERAPY: ICD-10-CM

## 2019-12-09 DIAGNOSIS — D05.12 DUCTAL CARCINOMA IN SITU (DCIS) OF LEFT BREAST: ICD-10-CM

## 2019-12-09 LAB
ALBUMIN SERPL BCP-MCNC: 4.4 G/DL (ref 3.5–5.2)
ALP SERPL-CCNC: 140 U/L (ref 55–135)
ALT SERPL W/O P-5'-P-CCNC: 46 U/L (ref 10–44)
ANION GAP SERPL CALC-SCNC: 8 MMOL/L (ref 8–16)
AST SERPL-CCNC: 26 U/L (ref 10–40)
BASOPHILS # BLD AUTO: 0.02 K/UL (ref 0–0.2)
BASOPHILS NFR BLD: 0.3 % (ref 0–1.9)
BILIRUB SERPL-MCNC: 0.3 MG/DL (ref 0.1–1)
BUN SERPL-MCNC: 16 MG/DL (ref 6–20)
CALCIUM SERPL-MCNC: 10.5 MG/DL (ref 8.7–10.5)
CHLORIDE SERPL-SCNC: 104 MMOL/L (ref 95–110)
CO2 SERPL-SCNC: 29 MMOL/L (ref 23–29)
CREAT SERPL-MCNC: 0.8 MG/DL (ref 0.5–1.4)
DIFFERENTIAL METHOD: ABNORMAL
EOSINOPHIL # BLD AUTO: 0.2 K/UL (ref 0–0.5)
EOSINOPHIL NFR BLD: 3.2 % (ref 0–8)
ERYTHROCYTE [DISTWIDTH] IN BLOOD BY AUTOMATED COUNT: 13.6 % (ref 11.5–14.5)
EST. GFR  (AFRICAN AMERICAN): >60 ML/MIN/1.73 M^2
EST. GFR  (NON AFRICAN AMERICAN): >60 ML/MIN/1.73 M^2
GLUCOSE SERPL-MCNC: 97 MG/DL (ref 70–110)
HCT VFR BLD AUTO: 44 % (ref 37–48.5)
HGB BLD-MCNC: 14 G/DL (ref 12–16)
IMM GRANULOCYTES # BLD AUTO: 0.03 K/UL (ref 0–0.04)
IMM GRANULOCYTES NFR BLD AUTO: 0.4 % (ref 0–0.5)
LYMPHOCYTES # BLD AUTO: 1.9 K/UL (ref 1–4.8)
LYMPHOCYTES NFR BLD: 26.4 % (ref 18–48)
MCH RBC QN AUTO: 29.5 PG (ref 27–31)
MCHC RBC AUTO-ENTMCNC: 31.8 G/DL (ref 32–36)
MCV RBC AUTO: 93 FL (ref 82–98)
MONOCYTES # BLD AUTO: 0.5 K/UL (ref 0.3–1)
MONOCYTES NFR BLD: 6.7 % (ref 4–15)
NEUTROPHILS # BLD AUTO: 4.6 K/UL (ref 1.8–7.7)
NEUTROPHILS NFR BLD: 63 % (ref 38–73)
NRBC BLD-RTO: 0 /100 WBC
PLATELET # BLD AUTO: 268 K/UL (ref 150–350)
PMV BLD AUTO: 11 FL (ref 9.2–12.9)
POTASSIUM SERPL-SCNC: 4.6 MMOL/L (ref 3.5–5.1)
PROT SERPL-MCNC: 7.6 G/DL (ref 6–8.4)
RBC # BLD AUTO: 4.75 M/UL (ref 4–5.4)
SODIUM SERPL-SCNC: 141 MMOL/L (ref 136–145)
WBC # BLD AUTO: 7.26 K/UL (ref 3.9–12.7)

## 2019-12-09 PROCEDURE — 36415 COLL VENOUS BLD VENIPUNCTURE: CPT

## 2019-12-09 PROCEDURE — 85025 COMPLETE CBC W/AUTO DIFF WBC: CPT

## 2019-12-09 PROCEDURE — 80053 COMPREHEN METABOLIC PANEL: CPT

## 2019-12-13 ENCOUNTER — OFFICE VISIT (OUTPATIENT)
Dept: HEMATOLOGY/ONCOLOGY | Facility: CLINIC | Age: 49
End: 2019-12-13
Payer: MEDICAID

## 2019-12-13 VITALS
WEIGHT: 147.69 LBS | SYSTOLIC BLOOD PRESSURE: 110 MMHG | BODY MASS INDEX: 29.77 KG/M2 | DIASTOLIC BLOOD PRESSURE: 72 MMHG | HEIGHT: 59 IN | TEMPERATURE: 98 F | OXYGEN SATURATION: 98 % | HEART RATE: 91 BPM

## 2019-12-13 DIAGNOSIS — D05.12 DUCTAL CARCINOMA IN SITU (DCIS) OF LEFT BREAST: Primary | ICD-10-CM

## 2019-12-13 DIAGNOSIS — R74.8 ELEVATED ALKALINE PHOSPHATASE LEVEL: ICD-10-CM

## 2019-12-13 DIAGNOSIS — R74.8 ABNORMAL TRANSAMINASES: ICD-10-CM

## 2019-12-13 PROCEDURE — 99214 PR OFFICE/OUTPT VISIT, EST, LEVL IV, 30-39 MIN: ICD-10-PCS | Mod: S$PBB,,, | Performed by: INTERNAL MEDICINE

## 2019-12-13 PROCEDURE — 99213 OFFICE O/P EST LOW 20 MIN: CPT | Mod: PBBFAC | Performed by: INTERNAL MEDICINE

## 2019-12-13 PROCEDURE — 99214 OFFICE O/P EST MOD 30 MIN: CPT | Mod: S$PBB,,, | Performed by: INTERNAL MEDICINE

## 2019-12-13 PROCEDURE — 99999 PR PBB SHADOW E&M-EST. PATIENT-LVL III: ICD-10-PCS | Mod: PBBFAC,,, | Performed by: INTERNAL MEDICINE

## 2019-12-13 PROCEDURE — 99999 PR PBB SHADOW E&M-EST. PATIENT-LVL III: CPT | Mod: PBBFAC,,, | Performed by: INTERNAL MEDICINE

## 2019-12-13 NOTE — PROGRESS NOTES
Subjective:       Patient ID: Lou Jc is a 49 y.o. female.    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 with positive margin s/p re-excison on 11/16/18  S/p adjuvant XRT left breast under direction of Dr. Vázquez at [facility] on 1/25/2019   Arimidex 3/2019- present            HPI: Pt is a 49 y.o. female seen today for f/u for   Left breast cancer. She was initially seen at [facility] ([facility] in [place], [place]). She had a BI-RADS 0 bilateral screening mammogram for architectural distortion and subsequent bilateral diagnostic mammogram and left breast ultrasound that were BI-RADS 4 and revealed focal asymmetry at the outer central position left breast She subsequently underwent ultrasound-guided core needle biopsy on 8/17/18 with pathology showing fragmented papilloma with no evidence of atypia or in-situ or invasive carcinoma. She was then seen by General Surgery at [facility] on 8/31/18 to discuss wire localization excisional biopsy of this intraductal papilloma, which ultimately occurred on 9/21/18. Final pathology from the excision biopsy upstaged to a 2.0 cm grade 2 ER+ (moderate) NV+ (moderate) ductal carcinoma in situ with focally positive medial margin with no evidence of invasive carcinoma. She subsequently underwent re-excison on 11/16/18. Final pathology showed LCIS but no residual DCIS. Margins uninvolved with closest margins are medial and inferior - 1mm, superior 6mm. She routinely performs self breast exams. She has not noted a change on breast exam. Patient denies nipple discharge. She reports she has not undergone annual routine screening mammograms. She reports history of previous breast bx in 2015- pathologically benign. She underwent [facility] testing with was negative .         She is followed by Rad/Onc  She has completed adjuvant XRT left breast to a dose of 5005cGy under direction of  Dr. Vázquez at [facility] on 1/25/2019        She reports  diffuse breast tenderness has improved  Her ribs and intercostal muscles remain  exquisitely tender   She is uncertain whether  pain began following surgical intervention or during RT     She occasionally takes OTC NSAIDS with  relief  She continues with mild hot flashes, sig improved relieved with prescribed Clonidine  No SOB/CP  Appetite and weight stable  No arthralgias        Mammo 4/23/2019  BI-RADS Category: Overall: 2 - Benign      [facility], negative Integrated BRACAnalysis with [facility]      Risk Factors/Breast History Age of Menarche: 10y.o. Age of Menopause: 48. LMP 2016. History of Hormonal Therapy: OCPs x 4 years. No HRT.Nulliparous Family History of Breast Ca: Maternal aunt (age 60s). Maternal aunt (age 70s). Paternal aunt (age 70s).   No Family hx of Ovarian Cancer. Other Family History: Father with pancreatic cancer.         Social History .She is a chronic smoker. She has smoked 1ppd x 25 yrs. She drinks 2 beers/week.            Past Medical History:   Diagnosis Date    Allergy     Breast cyst     DCIS (ductal carcinoma in situ)     Left    Endometriosis     Papilloma of breast     Reflux          Past Surgical History:   Procedure Laterality Date    BREAST BIOPSY Left     papilloma    BREAST CYST ASPIRATION Left     BREAST LUMPECTOMY      2 lumpectomy    diagnostic lap      TONSILLECTOMY         Review of Systems   Constitutional: Negative for appetite change, fatigue, fever and unexpected weight change.   HENT: Negative for mouth sores.    Eyes: Negative for visual disturbance.   Respiratory: Negative for cough and shortness of breath.    Cardiovascular: Negative for chest pain.   Gastrointestinal: Negative for abdominal pain and diarrhea.   Genitourinary: Negative for frequency.   Musculoskeletal: Negative for back pain.   Skin: Negative for rash.   Neurological: Negative for headaches.   Hematological: Negative for adenopathy.  "  Psychiatric/Behavioral: The patient is nervous/anxious.        Objective:        Vitals:    12/13/19 0811   BP: 110/72   Pulse: 91   Temp: 97.8 °F (36.6 °C)   TempSrc: Oral   SpO2: 98%   Weight: 67 kg (147 lb 11.3 oz)   Height: 4' 11" (1.499 m)       Physical Exam   Constitutional: She is oriented to person, place, and time. She appears well-developed and well-nourished.   HENT:   Head: Normocephalic.   Eyes: Conjunctivae and lids are normal. No scleral icterus.   Neck: Normal range of motion. Neck supple. No thyromegaly present.   Cardiovascular: Normal rate, regular rhythm and normal heart sounds.   No murmur heard.  Pulmonary/Chest: Breath sounds normal. She has no wheezes. She has no rales. Right breast exhibits tenderness. Right breast exhibits no mass, no nipple discharge and no skin change. Left breast exhibits tenderness. Left breast exhibits no mass, no nipple discharge and no skin change.   Lt breast- minor skin changes noted at radiation site       Abdominal: Soft. Bowel sounds are normal. She exhibits no distension and no mass. There is no hepatosplenomegaly. There is no tenderness. There is no rebound and no guarding.   Musculoskeletal: Normal range of motion. She exhibits no edema or tenderness.   Lymphadenopathy:     She has no cervical adenopathy.     She has no axillary adenopathy.        Right: No supraclavicular adenopathy present.        Left: No supraclavicular adenopathy present.   Neurological: She is alert and oriented to person, place, and time. No cranial nerve deficit. Coordination normal.   Skin: Skin is warm and dry. No ecchymosis, no petechiae and no rash noted. No erythema.   Psychiatric: She has a normal mood and affect.         Lab Results   Component Value Date    WBC 7.26 12/09/2019    HGB 14.0 12/09/2019    HCT 44.0 12/09/2019    MCV 93 12/09/2019     12/09/2019     Results for STEFFANIE JIMENEZ (MRN [id number]) as of 1/10/2019 09:20   Ref. Range 12/6/2018 10:27 "   Estradiol Latest Ref Range: See Text pg/mL <10 (A)   Testosterone, Free Latest Units: pg/mL 1.8     FINAL PATHOLOGIC DIAGNOSIS 9/21/2018   Left breast, wire localized lumpectomy:  -Ductal carcinoma in situ (DCIS), Grade 2 (intermediate), focally involving the medial margin, present in 5  blocks with an estimated size of at least 20 mm (see cancer case summary below)  -Background breast tissue with fibrocystic change including dense fibrosis, papillomatosis, adenosis, cystic  dilatation, apocrine metaplasia, and fibroadenomatoid nodules  -Biopsy site changes  -No evidence of invasive carcinoma  Surgical pathology cancer case summary:  -Procedure: Excision (less than total mastectomy)  -Specimen laterality: Left  -Size (extent) of DCIS: Estimated size of DCIS: At least 20 mm: Number blocks with DCIS: 5  -Histologic type: Ductal carcinoma in situ  -Architectural patterns: Solid  -Nuclear grade: Grade 2 (intermediate)  -Necrosis: Not identified  -Margins: Medial margin focally positive for DCIS  -Regional lymph nodes: No lymph nodes submitted or found  -Pathologic stage classification (pTNM):  Primary tumor (pT): pTis (DCIS): Ductal carcinoma in situ  Regional lymph nodes (pN): pNX: Regional lymph nodes cannot be assessed  -Microcalcifications: Present in DCIS and nonneoplastic tissue     Hormone receptor results (performed with appropriate controls):  ER - Positive (moderate)  IL - Positive (moderate)           FINAL PATHOLOGIC DIAGNOSIS  11/16/2018    1. LEFT BREAST, RE-EXCISION:  No residual ductal carcinoma in-situ  Lobular carcinoma in-situ  Margins negative for neoplasia or malignancy (closest margins are medial and inferior - 1mm, superior 6mm)  Atypical lobular hyperplasia  Intraductal papilloma  Negative for malignancy  Extensive previous procedure related changes present  (specimen has been submitted in it's entirety)  2. NEW ANTERIOR MARGIN, EXCISION:  Benign skin and subcutaneous tissue with extensive  inflammation and procedure related changes  Negative for neoplasia or malignancy  (specimen has been submitted in its entirety)  Part1.  Immunostain E-Cadherin has been performed (with appropriate controls) on two sections #1C AND 1G and is  negative, supporting the above diagnosis.  Hormone receptors performed for LCIS (Part 1)  ER - Positive (90% of the tumor nuclei, moderate to strong)  HI - Positive (30% of the tumor nuclei, weak)  HER2 - Indeterminate (2+), specimen will be sent out for FISH analysis  Ki67 - 5%, positive tumor nuclei  ODH8SHR,ER,PGR            Bone Density  1/18/2018   Osteopenia.  Ten year probability of major osteoporotic fracture is 3.9%, and hip fracture is 0.2%.      Results for STEFFANIE JIMENEZ (MRN [id number]) as of 6/13/2019 08:27   Ref. Range 4/8/2019 10:08 6/10/2019 08:19   Vit D, 25-Hydroxy Latest Ref Range: 30 - 96 ng/mL 45 52       Assessment:       1. Ductal carcinoma in situ (DCIS) of left breast    2. Elevated alkaline phosphatase level    3. Abnormal transaminases        Plan:       1-3    Patient is a 49 y.o. female with Stage 0 pTis N0 M0 grade 2 ER + HI + DCIS of the left breast status post left partial mastectomy with focally positive medial margin s/p re-excison on 11/16/18. Final pathology showed LCIS but no residual DCIS with negative with closest margins are medial and inferior - 1mm   S/p  adjuvant XRT  under the direction of Rad/Onc at [facility] completed 1/25/2019     [facility], negative Integrated BRACAnalysis with [facility]       Bone density  1/10/2019 reveals osteopenia  Cont Ca and Vit D   Consider bisphosphonate therapy   Plan bone density every 1  To 2 years   Continue weight bearing exercises  Avoidance of smoking    Continue monthly SBE  Plan  bilateral mammogram alternating with MRI surveillance moving forward.    She will continue with monthly self-breast exams  Follow-up Mammo BI-RADS Category:  Overall: 2 - Benign    MRI planned 10/2019  Impression:  BilateralThere is no MR evidence of malignancy.     BI-RADS Category:   Overall: 2 - Benign    We previously discussed lifestyle and weight-bearing exercises and alcohol in moderation recommended.   We will continue to monitor her bone health as well.  She will follow-up every 3 months for years 1-3      pain in breast /ribs/sternum improved   Pt has been evaluated by breast surgery in past  Pt has been prescribed Vit D   Pt had trial  hold of AI and eval for any improvement in sx's ( although suspicion that AI contributing factor low) until f/u with Dr. Matias    Cont  Clonidine 0.1mg po bid prn hot flashes     Plan CT imaging      She will follow-up in 3  mos    Follow up with Dr. Matias  .    Advance Care Planning     Power of   I initiated the process of advance care planning today and explained the importance of this process to the patient.  I introduced the concept of advance directives to the patient, as well. Then the patient received detailed information about the importance of designating a Health Care Power of  (HCPOA). She was also instructed to communicate with this person about their wishes for future healthcare, should she become sick and lose decision-making capacity. The patient has not previously appointed a HCPOA. After our discussion, the patient has decided to complete a HCPOA and has appointed her brother  Xiang Trinidad [phone] and NAME:  Leon Lay [phone]  I spent a total time of 16  minutes discussing this issue with the patient.    Advance Care Planning     Living Will  During this visit, I engaged the patient in the advance care planning process.  The patient and I reviewed the role for advance directives and their purpose in directing future healthcare if the patient's unable to speak for him/herself.  At this point in time, the patient does have full decision-making capacity.  We discussed different extreme health states that she  could experience, and reviewed what kind of medical care she would want in those situations.  The patient communicated that if she were comatose and had little chance of a meaningful recovery, she would not want machines/life-sustaining treatments used.    The patient has completed a living will to reflect these preferences.  The patient  has already designated a healthcare power of  to make decisions on her behalf.   I spent a total of  16  minutes engaging the patient in this advance care planning discussion.           Cc: MD Maryjo Pagan MD

## 2019-12-19 ENCOUNTER — HOSPITAL ENCOUNTER (OUTPATIENT)
Dept: RADIOLOGY | Facility: HOSPITAL | Age: 49
Discharge: HOME | End: 2019-12-19
Attending: INTERNAL MEDICINE
Payer: MEDICAID

## 2019-12-19 DIAGNOSIS — R74.8 ABNORMAL TRANSAMINASES: ICD-10-CM

## 2019-12-19 DIAGNOSIS — R74.8 ELEVATED ALKALINE PHOSPHATASE LEVEL: ICD-10-CM

## 2019-12-19 DIAGNOSIS — D05.12 DUCTAL CARCINOMA IN SITU (DCIS) OF LEFT BREAST: ICD-10-CM

## 2019-12-19 PROCEDURE — 74177 CT ABDOMEN PELVIS WITH CONTRAST: ICD-10-PCS | Mod: 26,,, | Performed by: RADIOLOGY

## 2019-12-19 PROCEDURE — 74177 CT ABD & PELVIS W/CONTRAST: CPT | Mod: 26,,, | Performed by: RADIOLOGY

## 2019-12-19 PROCEDURE — 25500020 PHARM REV CODE 255: Performed by: INTERNAL MEDICINE

## 2019-12-19 PROCEDURE — 74177 CT ABD & PELVIS W/CONTRAST: CPT | Mod: TC

## 2019-12-19 RX ADMIN — IOHEXOL 75 ML: 350 INJECTION, SOLUTION INTRAVENOUS at 09:12

## 2019-12-26 RX ORDER — ANASTROZOLE 1 MG/1
TABLET ORAL
Qty: 30 TABLET | Refills: 1 | Status: SHIPPED | OUTPATIENT
Start: 2019-12-26 | End: 2020-03-13 | Stop reason: SDUPTHER

## 2020-01-30 ENCOUNTER — TELEPHONE (OUTPATIENT)
Dept: OBSTETRICS AND GYNECOLOGY | Facility: CLINIC | Age: 50
End: 2020-01-30

## 2020-01-30 NOTE — TELEPHONE ENCOUNTER
Patient scheduled for her annual exam with Dr. Davis, March 20th at 9am, [facility]. Patient reports hot flashes and sexual dysfunction. Patient does not desire to see PA for earlier appt access. DAVID Sanchez.    ----- Message from Lisa Navarro MA sent at 1/30/2020 10:04 AM CST -----  Contact: STEFFANIE JIMENEZ      ----- Message -----  From: Reny Boone  Sent: 1/30/2020   9:56 AM CST  To: [facility] Staff    Name of Who is Calling: STEFFANIE JIMENEZ      What is the request in detail: Would like to speak to staff in regards to wanting to make an appointment for well woman exam. Please advise.       Can the clinic reply by [facility]: No      What Number to Call Back if not in [facility]: [phone]

## 2020-01-30 NOTE — TELEPHONE ENCOUNTER
Message sent to [facility] nurse navigator to call pt and schedule appointment

## 2020-01-30 NOTE — TELEPHONE ENCOUNTER
----- Message from Reny Boone sent at 1/30/2020  9:56 AM CST -----  Contact: STEFFANIE JIMENEZ  Name of Who is Calling: STEFFANIE JIMENEZ      What is the request in detail: Would like to speak to staff in regards to wanting to make an appointment for well woman exam. Please advise.       Can the clinic reply by [facility]: No      What Number to Call Back if not in [facility]: [phone]

## 2020-03-11 ENCOUNTER — LAB VISIT (OUTPATIENT)
Dept: LAB | Facility: HOSPITAL | Age: 50
End: 2020-03-11
Attending: INTERNAL MEDICINE
Payer: MEDICAID

## 2020-03-11 DIAGNOSIS — D05.12 DUCTAL CARCINOMA IN SITU (DCIS) OF LEFT BREAST: ICD-10-CM

## 2020-03-11 LAB
ALBUMIN SERPL BCP-MCNC: 4.1 G/DL (ref 3.5–5.2)
ALP SERPL-CCNC: 131 U/L (ref 55–135)
ALT SERPL W/O P-5'-P-CCNC: 28 U/L (ref 10–44)
ANION GAP SERPL CALC-SCNC: 8 MMOL/L (ref 8–16)
AST SERPL-CCNC: 21 U/L (ref 10–40)
BASOPHILS # BLD AUTO: 0.03 K/UL (ref 0–0.2)
BASOPHILS NFR BLD: 0.4 % (ref 0–1.9)
BILIRUB SERPL-MCNC: 0.2 MG/DL (ref 0.1–1)
BUN SERPL-MCNC: 15 MG/DL (ref 6–20)
CALCIUM SERPL-MCNC: 9.7 MG/DL (ref 8.7–10.5)
CHLORIDE SERPL-SCNC: 109 MMOL/L (ref 95–110)
CO2 SERPL-SCNC: 27 MMOL/L (ref 23–29)
CREAT SERPL-MCNC: 0.8 MG/DL (ref 0.5–1.4)
DIFFERENTIAL METHOD: ABNORMAL
EOSINOPHIL # BLD AUTO: 0.3 K/UL (ref 0–0.5)
EOSINOPHIL NFR BLD: 4 % (ref 0–8)
ERYTHROCYTE [DISTWIDTH] IN BLOOD BY AUTOMATED COUNT: 13.9 % (ref 11.5–14.5)
EST. GFR  (AFRICAN AMERICAN): >60 ML/MIN/1.73 M^2
EST. GFR  (NON AFRICAN AMERICAN): >60 ML/MIN/1.73 M^2
GLUCOSE SERPL-MCNC: 67 MG/DL (ref 70–110)
HCT VFR BLD AUTO: 39.3 % (ref 37–48.5)
HGB BLD-MCNC: 12.3 G/DL (ref 12–16)
IMM GRANULOCYTES # BLD AUTO: 0.02 K/UL (ref 0–0.04)
IMM GRANULOCYTES NFR BLD AUTO: 0.3 % (ref 0–0.5)
LYMPHOCYTES # BLD AUTO: 2 K/UL (ref 1–4.8)
LYMPHOCYTES NFR BLD: 29.1 % (ref 18–48)
MCH RBC QN AUTO: 29.1 PG (ref 27–31)
MCHC RBC AUTO-ENTMCNC: 31.3 G/DL (ref 32–36)
MCV RBC AUTO: 93 FL (ref 82–98)
MONOCYTES # BLD AUTO: 0.5 K/UL (ref 0.3–1)
MONOCYTES NFR BLD: 7.1 % (ref 4–15)
NEUTROPHILS # BLD AUTO: 4.1 K/UL (ref 1.8–7.7)
NEUTROPHILS NFR BLD: 59.1 % (ref 38–73)
NRBC BLD-RTO: 0 /100 WBC
PLATELET # BLD AUTO: 257 K/UL (ref 150–350)
PMV BLD AUTO: 10.6 FL (ref 9.2–12.9)
POTASSIUM SERPL-SCNC: 4.1 MMOL/L (ref 3.5–5.1)
PROT SERPL-MCNC: 6.8 G/DL (ref 6–8.4)
RBC # BLD AUTO: 4.22 M/UL (ref 4–5.4)
SODIUM SERPL-SCNC: 144 MMOL/L (ref 136–145)
WBC # BLD AUTO: 6.95 K/UL (ref 3.9–12.7)

## 2020-03-11 PROCEDURE — 36415 COLL VENOUS BLD VENIPUNCTURE: CPT

## 2020-03-11 PROCEDURE — 85025 COMPLETE CBC W/AUTO DIFF WBC: CPT

## 2020-03-11 PROCEDURE — 80053 COMPREHEN METABOLIC PANEL: CPT

## 2020-03-13 ENCOUNTER — OFFICE VISIT (OUTPATIENT)
Dept: HEMATOLOGY/ONCOLOGY | Facility: CLINIC | Age: 50
End: 2020-03-13
Payer: MEDICAID

## 2020-03-13 VITALS
WEIGHT: 142.63 LBS | DIASTOLIC BLOOD PRESSURE: 88 MMHG | BODY MASS INDEX: 28.76 KG/M2 | HEART RATE: 76 BPM | SYSTOLIC BLOOD PRESSURE: 130 MMHG | TEMPERATURE: 98 F | OXYGEN SATURATION: 98 % | HEIGHT: 59 IN

## 2020-03-13 DIAGNOSIS — Z79.811 ENCOUNTER FOR MONITORING AROMATASE INHIBITOR THERAPY: ICD-10-CM

## 2020-03-13 DIAGNOSIS — M85.80 OSTEOPENIA, UNSPECIFIED LOCATION: ICD-10-CM

## 2020-03-13 DIAGNOSIS — D05.12 DUCTAL CARCINOMA IN SITU (DCIS) OF LEFT BREAST: Primary | ICD-10-CM

## 2020-03-13 DIAGNOSIS — D05.12 DUCTAL CARCINOMA IN SITU (DCIS) OF LEFT BREAST: ICD-10-CM

## 2020-03-13 DIAGNOSIS — T45.1X5A HOT FLASHES RELATED TO AROMATASE INHIBITOR THERAPY: ICD-10-CM

## 2020-03-13 DIAGNOSIS — R23.2 HOT FLASHES RELATED TO AROMATASE INHIBITOR THERAPY: ICD-10-CM

## 2020-03-13 DIAGNOSIS — Z51.81 ENCOUNTER FOR MONITORING AROMATASE INHIBITOR THERAPY: ICD-10-CM

## 2020-03-13 PROCEDURE — 99214 PR OFFICE/OUTPT VISIT, EST, LEVL IV, 30-39 MIN: ICD-10-PCS | Mod: S$PBB,,, | Performed by: INTERNAL MEDICINE

## 2020-03-13 PROCEDURE — 99214 OFFICE O/P EST MOD 30 MIN: CPT | Mod: S$PBB,,, | Performed by: INTERNAL MEDICINE

## 2020-03-13 PROCEDURE — 99999 PR PBB SHADOW E&M-EST. PATIENT-LVL IV: ICD-10-PCS | Mod: PBBFAC,,, | Performed by: INTERNAL MEDICINE

## 2020-03-13 PROCEDURE — 99999 PR PBB SHADOW E&M-EST. PATIENT-LVL IV: CPT | Mod: PBBFAC,,, | Performed by: INTERNAL MEDICINE

## 2020-03-13 PROCEDURE — 99214 OFFICE O/P EST MOD 30 MIN: CPT | Mod: PBBFAC | Performed by: INTERNAL MEDICINE

## 2020-03-13 RX ORDER — FERROUS SULFATE, DRIED 160(50) MG
1 TABLET, EXTENDED RELEASE ORAL 2 TIMES DAILY WITH MEALS
COMMUNITY

## 2020-03-13 RX ORDER — ANASTROZOLE 1 MG/1
1 TABLET ORAL DAILY
Qty: 30 TABLET | Refills: 1 | Status: SHIPPED | OUTPATIENT
Start: 2020-03-13 | End: 2020-05-19

## 2020-03-13 NOTE — PATIENT INSTRUCTIONS
Nonalcoholic Fatty Liver Disease (NAFLD)  Nonalcoholic fatty liver disease (NAFLD) is a common disease of the liver. It occurs when you have too much fat in the liver. If NAFLD is severe, it can cause liver damage that seems like the damage caused by drinking too much alcohol. But NAFLD is not caused by drinking alcohol. This sheet tells you more about NAFLD and how it can be managed.    How the liver works   The liver is an organ in the upper right side of the belly (abdomen). It has many important jobs. These include:  · Breaking down (metabolizing) proteins, carbohydrates, and fats  · Making a substance called bile that helps break down fats  · Storing and releasing sugar (glucose) into the blood to give the body energy  · Removing toxins from the blood  · Helping with blood clotting  Understanding NAFLD  A healthy liver may contain some fat. But if too much fat builds up in the liver, this causes NAFLD. NAFLD can be mild, causing fatty liver. Or it can be more severe and show inflammation, as well as the fat. This can cause non-alcoholic steatohepatitis (CLIFFORD).  · Fatty liver. With fatty liver, the liver simply has more fat than normal. This extra fat usually does not harm the liver.  · CLIFFORD. With CLIFFORD, the fatty liver becomes inflamed over time. CLIFFORD is serious because it can lead to scarring of the liver (fibrosis). Over time, the scarring may lead to cirrhosis of the liver. This can eventually cause liver failure or liver cancer.  Causes and risk factors of NAFLD  Doctors don't know what causes NAFLD. But certain things make the problem more likely to happen. These include:  · Obesity  · Prediabetes or diabetes  · High levels of fat found in the blood (cholesterol and triglycerides)  · Being exposed to certain medicines   Symptoms of NAFLD  Most people with NAFLD have no symptoms. If symptoms do occur, they can include:  · Tiredness  · Weakness  · Weight loss  · Loss of appetite  · Nausea and  vomiting  · Belly pain and cramping  · Yellowing of the skin and eyes (jaundice), as well as dark urine, or light-colored stools  · Swelling in the belly or legs  Diagnosing NAFLD  Your healthcare provider may think you have NAFLD if routine blood tests show high levels of liver enzymes. This may mean you have a liver problem. You may need one or more imaging tests, such as an ultrasound, CT, or MRI. You may need more blood tests to look for other causes of liver disease. You may also need a liver biopsy. During this test, a hollow needle is used to remove a tiny tissue sample from your liver. This tissue is then checked in a lab. This test can find signs of damage to liver tissue. It can also help figure out the cause of the damage and tell the difference between fatty liver and CLIFFORD.  Treating NAFLD  Treatment for NAFLD varies for each person. The best early treatment is to treat any underlying conditions causing metabolic syndrome. This is the name for a group of conditions that includes:  · High blood pressure  · High levels of cholesterol and triglycerides  · Being overweight or obese  · Diabetes  Your healthcare provider will monitor your health and treat any symptoms or underlying health problems you have. Your provider will also work with you to control your risk factors. This will make liver damage less likely. In fact, treating those underlying conditions can often improve liver disease. You may need to take certain medicines, but no medicine will cure NAFLD. This is why treating the underlying conditions is most important. Your plan may include:  · Losing extra weight  · Getting regular exercise  · Controlling diabetes and high cholesterol or triglyceride levels  · Taking medicines and vitamins as prescribed by your provider  · Quitting smoking  · Not drinking alcohol  · Eating a healthy and balanced diet  Living with NAFLD  If NAFLD is caught early, it can be managed with treatment. Your healthcare  provider will discuss further treatment choices with you as needed.  Be sure to ask your provider about recommended vaccines. These include vaccines for viruses that can cause liver disease.  Date Last Reviewed: 12/1/2016  © 8010-0325 [employer]. [address] [place] [address], [place] [address]. All rights reserved. This information is not intended as a substitute for professional medical care. Always follow your healthcare professional's instructions.

## 2020-03-13 NOTE — PROGRESS NOTES
Subjective:       Patient ID: Lou Jc is a 49 y.o. female.    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 with positive margin s/p re-excison on 11/16/18  S/p adjuvant XRT left breast under direction of Dr. Vázquez at [facility] on 1/25/2019   Arimidex 3/2019- present            HPI: Pt is a 49 y.o. female seen today for f/u for   Left breast cancer. She was initially seen at [facility] ([facility] in [place], [place]). She had a BI-RADS 0 bilateral screening mammogram for architectural distortion and subsequent bilateral diagnostic mammogram and left breast ultrasound that were BI-RADS 4 and revealed focal asymmetry at the outer central position left breast She subsequently underwent ultrasound-guided core needle biopsy on 8/17/18 with pathology showing fragmented papilloma with no evidence of atypia or in-situ or invasive carcinoma. She was then seen by General Surgery at [facility] on 8/31/18 to discuss wire localization excisional biopsy of this intraductal papilloma, which ultimately occurred on 9/21/18. Final pathology from the excision biopsy upstaged to a 2.0 cm grade 2 ER+ (moderate) PA+ (moderate) ductal carcinoma in situ with focally positive medial margin with no evidence of invasive carcinoma. She subsequently underwent re-excison on 11/16/18. Final pathology showed LCIS but no residual DCIS. Margins uninvolved with closest margins are medial and inferior - 1mm, superior 6mm. She routinely performs self breast exams. She has not noted a change on breast exam. Patient denies nipple discharge. She reports she has not undergone annual routine screening mammograms. She reports history of previous breast bx in 2015- pathologically benign. She underwent [facility] testing with was negative .         She is followed by Rad/Onc  She has completed adjuvant XRT left breast to a dose of 5005cGy under direction of  Dr. Vázquez at [facility] on 1/25/2019       Pt doing well    She is has started an exercise and wt loss regimen and is feeling much better  She continues with mild hot flashes, sig improved relieved with prescribed Clonidine  No SOB/CP  Appetite and weight stable  No arthralgias        Mammo 4/23/2019  BI-RADS Category: Overall: 2 - Benign      [facility], negative Integrated BRACAnalysis with myRisk      Risk Factors/Breast History Age of Menarche: 10y.o. Age of Menopause: 48. LMP 2016. History of Hormonal Therapy: OCPs x 4 years. No HRT.Nulliparous Family History of Breast Ca: Maternal aunt (age 60s). Maternal aunt (age 70s). Paternal aunt (age 70s).   No Family hx of Ovarian Cancer. Other Family History: Father with pancreatic cancer.         Social History .She is a chronic smoker. She has smoked 1ppd x 25 yrs. She drinks 2 beers/week.            Past Medical History:   Diagnosis Date    Allergy     Breast cyst     DCIS (ductal carcinoma in situ)     Left    Endometriosis     Papilloma of breast     Reflux          Past Surgical History:   Procedure Laterality Date    BREAST BIOPSY Left     papilloma    BREAST CYST ASPIRATION Left     BREAST LUMPECTOMY      2 lumpectomy    diagnostic lap      TONSILLECTOMY         Review of Systems   Constitutional: Negative for appetite change, fatigue, fever and unexpected weight change.   HENT: Negative for mouth sores.    Eyes: Negative for visual disturbance.   Respiratory: Negative for cough and shortness of breath.    Cardiovascular: Negative for chest pain.   Gastrointestinal: Negative for abdominal pain and diarrhea.   Genitourinary: Negative for frequency.   Musculoskeletal: Negative for back pain.   Skin: Negative for rash.   Neurological: Negative for headaches.   Hematological: Negative for adenopathy.   Psychiatric/Behavioral: The patient is nervous/anxious.        Objective:        Vitals:    03/13/20 0954   BP: 130/88   BP Location: Right arm  "  Patient Position: Sitting   BP Method: Medium (Automatic)   Pulse: 76   Temp: 98.2 °F (36.8 °C)   TempSrc: Oral   SpO2: 98%   Weight: 64.7 kg (142 lb 10.2 oz)   Height: 4' 11" (1.499 m)       Physical Exam   Constitutional: She is oriented to person, place, and time. She appears well-developed and well-nourished.   HENT:   Head: Normocephalic.   Eyes: Conjunctivae and lids are normal. No scleral icterus.   Neck: Normal range of motion. Neck supple. No thyromegaly present.   Cardiovascular: Normal rate, regular rhythm and normal heart sounds.   No murmur heard.  Pulmonary/Chest: Breath sounds normal. She has no wheezes. She has no rales. Right breast exhibits tenderness. Right breast exhibits no mass, no nipple discharge and no skin change. Left breast exhibits tenderness. Left breast exhibits no mass, no nipple discharge and no skin change.   Lt breast- minor skin changes noted at radiation site       Abdominal: Soft. Bowel sounds are normal. She exhibits no distension and no mass. There is no hepatosplenomegaly. There is no tenderness. There is no rebound and no guarding.   Musculoskeletal: Normal range of motion. She exhibits no edema or tenderness.   Lymphadenopathy:     She has no cervical adenopathy.     She has no axillary adenopathy.        Right: No supraclavicular adenopathy present.        Left: No supraclavicular adenopathy present.   Neurological: She is alert and oriented to person, place, and time. No cranial nerve deficit. Coordination normal.   Skin: Skin is warm and dry. No ecchymosis, no petechiae and no rash noted. No erythema.   Psychiatric: She has a normal mood and affect.         Lab Results   Component Value Date    WBC 6.95 03/11/2020    HGB 12.3 03/11/2020    HCT 39.3 03/11/2020    MCV 93 03/11/2020     03/11/2020     Results for STEFFANIE JIMENEZ (MRN [id number]) as of 1/10/2019 09:20   Ref. Range 12/6/2018 10:27   Estradiol Latest Ref Range: See Text pg/mL <10 (A)   Testosterone, " Free Latest Units: pg/mL 1.8     FINAL PATHOLOGIC DIAGNOSIS 9/21/2018   Left breast, wire localized lumpectomy:  -Ductal carcinoma in situ (DCIS), Grade 2 (intermediate), focally involving the medial margin, present in 5  blocks with an estimated size of at least 20 mm (see cancer case summary below)  -Background breast tissue with fibrocystic change including dense fibrosis, papillomatosis, adenosis, cystic  dilatation, apocrine metaplasia, and fibroadenomatoid nodules  -Biopsy site changes  -No evidence of invasive carcinoma  Surgical pathology cancer case summary:  -Procedure: Excision (less than total mastectomy)  -Specimen laterality: Left  -Size (extent) of DCIS: Estimated size of DCIS: At least 20 mm: Number blocks with DCIS: 5  -Histologic type: Ductal carcinoma in situ  -Architectural patterns: Solid  -Nuclear grade: Grade 2 (intermediate)  -Necrosis: Not identified  -Margins: Medial margin focally positive for DCIS  -Regional lymph nodes: No lymph nodes submitted or found  -Pathologic stage classification (pTNM):  Primary tumor (pT): pTis (DCIS): Ductal carcinoma in situ  Regional lymph nodes (pN): pNX: Regional lymph nodes cannot be assessed  -Microcalcifications: Present in DCIS and nonneoplastic tissue     Hormone receptor results (performed with appropriate controls):  ER - Positive (moderate)  HI - Positive (moderate)           FINAL PATHOLOGIC DIAGNOSIS  11/16/2018    1. LEFT BREAST, RE-EXCISION:  No residual ductal carcinoma in-situ  Lobular carcinoma in-situ  Margins negative for neoplasia or malignancy (closest margins are medial and inferior - 1mm, superior 6mm)  Atypical lobular hyperplasia  Intraductal papilloma  Negative for malignancy  Extensive previous procedure related changes present  (specimen has been submitted in it's entirety)  2. NEW ANTERIOR MARGIN, EXCISION:  Benign skin and subcutaneous tissue with extensive inflammation and procedure related changes  Negative for neoplasia or  malignancy  (specimen has been submitted in its entirety)  Part1.  Immunostain E-Cadherin has been performed (with appropriate controls) on two sections #1C AND 1G and is  negative, supporting the above diagnosis.  Hormone receptors performed for LCIS (Part 1)  ER - Positive (90% of the tumor nuclei, moderate to strong)  IN - Positive (30% of the tumor nuclei, weak)  HER2 - Indeterminate (2+), specimen will be sent out for FISH analysis  Ki67 - 5%, positive tumor nuclei  [id number],ER,PGR            Bone Density  1/18/2018   Osteopenia.  Ten year probability of major osteoporotic fracture is 3.9%, and hip fracture is 0.2%.      Results for STEFFANIE JIMENEZ (MRN [id number]) as of 6/13/2019 08:27   Ref. Range 4/8/2019 10:08 6/10/2019 08:19   Vit D, 25-Hydroxy Latest Ref Range: 30 - 96 ng/mL 45 52     Mammo diagnostic bilateral 4/23/2019  BI-RADS Category:   Overall: 2 - Benign    Assessment:       1. Ductal carcinoma in situ (DCIS) of left breast    2. Encounter for monitoring aromatase inhibitor therapy    3. Hot flashes related to aromatase inhibitor therapy    4. Osteopenia, unspecified location        Plan:       1-4    Patient is a 49 y.o. female with Stage 0 pTis N0 M0 grade 2 ER + IN + DCIS of the left breast status post left partial mastectomy with focally positive medial margin s/p re-excison on 11/16/18. Final pathology showed LCIS but no residual DCIS with negative with closest margins are medial and inferior - 1mm   S/p  adjuvant XRT  under the direction of Rad/Onc at [facility] completed 1/25/2019   [facility], negative Integrated BRACAnalysis with myRisk   Cont with Arimidex    Bone density  1/10/2019 reveals osteopenia  Cont Ca and Vit D   Consider bisphosphonate therapy   Plan bone density every 1  To 2 years   Continue weight bearing exercises  Avoidance of smoking    Continue monthly SBE  She will continue with monthly self-breast exams    Mammo 4/23/2019 BI-RADS Category:  Overall: 2 - Benign    MRI   breast 10/2019 BI-RADS Category: Overall: 2 - Benign      Cont  Clonidine 0.1mg po bid prn hot flashes     Plan follow up Mammo 4/2020    She will follow-up in 3  mos    Follow up with Dr. Matias  .    Advance Care Planning     Power of   I initiated the process of advance care planning today and explained the importance of this process to the patient.  I introduced the concept of advance directives to the patient, as well. Then the patient received detailed information about the importance of designating a Health Care Power of  (HCPOA). She was also instructed to communicate with this person about their wishes for future healthcare, should she become sick and lose decision-making capacity. The patient has not previously appointed a HCPOA. After our discussion, the patient has decided to complete a HCPOA and has appointed her brother  Xiang Trinidad [phone] and NAME:  Leon Lay [phone]  I spent a total time of 16  minutes discussing this issue with the patient.    Advance Care Planning     Living Will  During this visit, I engaged the patient in the advance care planning process.  The patient and I reviewed the role for advance directives and their purpose in directing future healthcare if the patient's unable to speak for him/herself.  At this point in time, the patient does have full decision-making capacity.  We discussed different extreme health states that she could experience, and reviewed what kind of medical care she would want in those situations.  The patient communicated that if she were comatose and had little chance of a meaningful recovery, she would not want machines/life-sustaining treatments used.    The patient has completed a living will to reflect these preferences.  The patient  has already designated a healthcare power of  to make decisions on her behalf.   I spent a total of  16  minutes engaging the patient in this advance care planning  discussion.           Cc: MD Maryjo Pagan MD

## 2020-05-19 ENCOUNTER — HOSPITAL ENCOUNTER (OUTPATIENT)
Dept: RADIOLOGY | Facility: HOSPITAL | Age: 50
Discharge: HOME | End: 2020-05-19
Attending: SURGERY
Payer: MEDICAID

## 2020-05-19 DIAGNOSIS — D05.12 DUCTAL CARCINOMA IN SITU (DCIS) OF LEFT BREAST: ICD-10-CM

## 2020-05-19 PROCEDURE — 77063 MAMMO DIGITAL SCREENING BILAT WITH TOMOSYNTHESIS_CAD: ICD-10-PCS | Mod: 26,,, | Performed by: RADIOLOGY

## 2020-05-19 PROCEDURE — 77063 BREAST TOMOSYNTHESIS BI: CPT | Mod: 26,,, | Performed by: RADIOLOGY

## 2020-05-19 PROCEDURE — 77067 SCR MAMMO BI INCL CAD: CPT | Mod: 26,,, | Performed by: RADIOLOGY

## 2020-05-19 PROCEDURE — 77067 MAMMO DIGITAL SCREENING BILAT WITH TOMOSYNTHESIS_CAD: ICD-10-PCS | Mod: 26,,, | Performed by: RADIOLOGY

## 2020-05-19 PROCEDURE — 77067 SCR MAMMO BI INCL CAD: CPT | Mod: TC

## 2020-05-19 RX ORDER — ANASTROZOLE 1 MG/1
TABLET ORAL
Qty: 30 TABLET | Refills: 6 | Status: SHIPPED | OUTPATIENT
Start: 2020-05-19 | End: 2020-12-29 | Stop reason: SDUPTHER

## 2020-05-25 DIAGNOSIS — T45.1X5A HOT FLASHES RELATED TO AROMATASE INHIBITOR THERAPY: ICD-10-CM

## 2020-05-25 DIAGNOSIS — R23.2 HOT FLASHES RELATED TO AROMATASE INHIBITOR THERAPY: ICD-10-CM

## 2020-05-25 RX ORDER — CLONIDINE HYDROCHLORIDE 0.1 MG/1
TABLET ORAL
Qty: 60 TABLET | Refills: 0 | Status: SHIPPED | OUTPATIENT
Start: 2020-05-25 | End: 2020-06-26

## 2020-05-26 ENCOUNTER — TELEPHONE (OUTPATIENT)
Dept: OBSTETRICS AND GYNECOLOGY | Facility: CLINIC | Age: 50
End: 2020-05-26

## 2020-05-26 NOTE — TELEPHONE ENCOUNTER
RN confirmed an appt for patient 06/25/20 at 0930 for her annual WWE/taking Arimidex therapy. Directions and appt information verbalized to patient with appt info mailed per patient request. DAVID Sanchez.

## 2020-05-26 NOTE — TELEPHONE ENCOUNTER
Message sent to [facility] nurse navigator to call pt and reschedule appointment

## 2020-05-26 NOTE — TELEPHONE ENCOUNTER
----- Message from Ivanna Martin sent at 5/26/2020 11:31 AM CDT -----  Contact: STEFFANIE JIMENEZ [[id number]]  Name of Who is Calling: STEFFANIE JIMENEZ [[id number]]    What is the request in detail:STEFFANIE JIMENEZ [[id number]] is calling in regards to appointment ...  Please contact to further discuss and advise      Can the clinic reply by [facility]: no    What [facility] Back [facility]  [phone] (home)

## 2020-06-12 ENCOUNTER — LAB VISIT (OUTPATIENT)
Dept: LAB | Facility: HOSPITAL | Age: 50
End: 2020-06-12
Attending: INTERNAL MEDICINE
Payer: MEDICAID

## 2020-06-12 DIAGNOSIS — D05.12 DUCTAL CARCINOMA IN SITU (DCIS) OF LEFT BREAST: ICD-10-CM

## 2020-06-12 LAB
ALBUMIN SERPL BCP-MCNC: 4.3 G/DL (ref 3.5–5.2)
ALP SERPL-CCNC: 133 U/L (ref 55–135)
ALT SERPL W/O P-5'-P-CCNC: 37 U/L (ref 10–44)
ANION GAP SERPL CALC-SCNC: 9 MMOL/L (ref 8–16)
AST SERPL-CCNC: 22 U/L (ref 10–40)
BASOPHILS # BLD AUTO: 0.03 K/UL (ref 0–0.2)
BASOPHILS NFR BLD: 0.4 % (ref 0–1.9)
BILIRUB SERPL-MCNC: 0.3 MG/DL (ref 0.1–1)
BUN SERPL-MCNC: 17 MG/DL (ref 6–20)
CALCIUM SERPL-MCNC: 9.7 MG/DL (ref 8.7–10.5)
CHLORIDE SERPL-SCNC: 106 MMOL/L (ref 95–110)
CO2 SERPL-SCNC: 25 MMOL/L (ref 23–29)
CREAT SERPL-MCNC: 0.9 MG/DL (ref 0.5–1.4)
DIFFERENTIAL METHOD: ABNORMAL
EOSINOPHIL # BLD AUTO: 0.3 K/UL (ref 0–0.5)
EOSINOPHIL NFR BLD: 3.4 % (ref 0–8)
ERYTHROCYTE [DISTWIDTH] IN BLOOD BY AUTOMATED COUNT: 14.2 % (ref 11.5–14.5)
EST. GFR  (AFRICAN AMERICAN): >60 ML/MIN/1.73 M^2
EST. GFR  (NON AFRICAN AMERICAN): >60 ML/MIN/1.73 M^2
GLUCOSE SERPL-MCNC: 128 MG/DL (ref 70–110)
HCT VFR BLD AUTO: 43.2 % (ref 37–48.5)
HGB BLD-MCNC: 13.6 G/DL (ref 12–16)
IMM GRANULOCYTES # BLD AUTO: 0.02 K/UL (ref 0–0.04)
IMM GRANULOCYTES NFR BLD AUTO: 0.3 % (ref 0–0.5)
LYMPHOCYTES # BLD AUTO: 1.7 K/UL (ref 1–4.8)
LYMPHOCYTES NFR BLD: 21.8 % (ref 18–48)
MCH RBC QN AUTO: 28.4 PG (ref 27–31)
MCHC RBC AUTO-ENTMCNC: 31.5 G/DL (ref 32–36)
MCV RBC AUTO: 90 FL (ref 82–98)
MONOCYTES # BLD AUTO: 0.4 K/UL (ref 0.3–1)
MONOCYTES NFR BLD: 5.4 % (ref 4–15)
NEUTROPHILS # BLD AUTO: 5.5 K/UL (ref 1.8–7.7)
NEUTROPHILS NFR BLD: 68.7 % (ref 38–73)
NRBC BLD-RTO: 0 /100 WBC
PLATELET # BLD AUTO: 283 K/UL (ref 150–350)
PMV BLD AUTO: 11.2 FL (ref 9.2–12.9)
POTASSIUM SERPL-SCNC: 4 MMOL/L (ref 3.5–5.1)
PROT SERPL-MCNC: 7.6 G/DL (ref 6–8.4)
RBC # BLD AUTO: 4.79 M/UL (ref 4–5.4)
SODIUM SERPL-SCNC: 140 MMOL/L (ref 136–145)
WBC # BLD AUTO: 7.97 K/UL (ref 3.9–12.7)

## 2020-06-12 PROCEDURE — 85025 COMPLETE CBC W/AUTO DIFF WBC: CPT

## 2020-06-12 PROCEDURE — 80053 COMPREHEN METABOLIC PANEL: CPT

## 2020-06-12 PROCEDURE — 36415 COLL VENOUS BLD VENIPUNCTURE: CPT

## 2020-06-15 ENCOUNTER — DOCUMENTATION ONLY (OUTPATIENT)
Dept: HEMATOLOGY/ONCOLOGY | Facility: CLINIC | Age: 50
End: 2020-06-15

## 2020-06-15 ENCOUNTER — OFFICE VISIT (OUTPATIENT)
Dept: HEMATOLOGY/ONCOLOGY | Facility: CLINIC | Age: 50
End: 2020-06-15
Payer: MEDICAID

## 2020-06-15 DIAGNOSIS — T45.1X5A HOT FLASHES RELATED TO AROMATASE INHIBITOR THERAPY: ICD-10-CM

## 2020-06-15 DIAGNOSIS — Z79.811 ENCOUNTER FOR MONITORING AROMATASE INHIBITOR THERAPY: ICD-10-CM

## 2020-06-15 DIAGNOSIS — R23.2 HOT FLASHES RELATED TO AROMATASE INHIBITOR THERAPY: ICD-10-CM

## 2020-06-15 DIAGNOSIS — Z51.81 ENCOUNTER FOR MONITORING AROMATASE INHIBITOR THERAPY: ICD-10-CM

## 2020-06-15 DIAGNOSIS — D05.12 DUCTAL CARCINOMA IN SITU (DCIS) OF LEFT BREAST: Primary | ICD-10-CM

## 2020-06-15 DIAGNOSIS — M85.80 OSTEOPENIA, UNSPECIFIED LOCATION: ICD-10-CM

## 2020-06-15 PROCEDURE — 99214 OFFICE O/P EST MOD 30 MIN: CPT | Mod: 95,,, | Performed by: INTERNAL MEDICINE

## 2020-06-15 PROCEDURE — 99214 PR OFFICE/OUTPT VISIT, EST, LEVL IV, 30-39 MIN: ICD-10-PCS | Mod: 95,,, | Performed by: INTERNAL MEDICINE

## 2020-06-15 NOTE — PROGRESS NOTES
Subjective:        Audio Only Telehealth Visit     The patient location is: Home  The chief complaint leading to consultation is: Follow-up breast CA   Visit type: Virtual visit with audio only (telephone)  Total time spent with patient: 15 min     The reason for the audio only service rather than synchronous audio and video virtual visit was related to technical difficulties or patient preference/necessity.     Each patient to whom I provide medical services by telemedicine is:  (1) informed of the relationship between the physician and patient and the respective role of any other health care provider with respect to management of the patient; and (2) notified that they may decline to receive medical services by telemedicine and may withdraw from such care at any time. Patient verbally consented to receive this service via voice-only telephone call.      This service was not originating from a related E/M service provided within the previous 7 days nor will  to an E/M service or procedure within the next 24 hours or my soonest available appointment.  Prevailing standard of care was able to be met in this audio-only visit.             Patient ID: Lou Jc is a 50 y.o. female.    Chief Complaint: No chief complaint on file.       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 with positive margin s/p re-excison on 11/16/18  S/p adjuvant XRT left breast under direction of Dr. Vázquez at [facility] on 1/25/2019   Arimidex 3/2019- present            HPI: Pt is a  50  y.o. female seen today via televisit for f/u for   Left breast cancer. She was initially seen at [facility] ([facility] in [place], [place]). She had a BI-RADS 0 bilateral screening mammogram for architectural distortion and subsequent bilateral diagnostic mammogram and left breast ultrasound that were BI-RADS 4 and revealed focal asymmetry at the outer central position left breast She  subsequently underwent ultrasound-guided core needle biopsy on 8/17/18 with pathology showing fragmented papilloma with no evidence of atypia or in-situ or invasive carcinoma. She was then seen by General Surgery at [facility] on 8/31/18 to discuss wire localization excisional biopsy of this intraductal papilloma, which ultimately occurred on 9/21/18. Final pathology from the excision biopsy upstaged to a 2.0 cm grade 2 ER+ (moderate) W[place]+ (moderate) ductal carcinoma in situ with focally positive medial margin with no evidence of invasive carcinoma. She subsequently underwent re-excison on 11/16/18. Final pathology showed LCIS but no residual DCIS. Margins uninvolved with closest margins are medial and inferior - 1mm, superior 6mm. She routinely performs self breast exams. She has not noted a change on breast exam. Patient denies nipple discharge. She reports she has not undergone annual routine screening mammograms. She reports history of previous breast bx in 2015- pathologically benign. She underwent [facility] testing with was negative .         She is followed by Rad/Onc  She has completed adjuvant XRT left breast to a dose of 5005cGy under direction of Dr. Vázquez at [facility] on 1/25/2019     She reports she was visited ED in March and was dx'd with foot fracture  She reports she fell off a chair  Follow-up with Orth planned this week  She continues with mild hot flashes,  relieved with prescribed Clonidine  No SOB/CP  Appetite and weight stable  No arthralgias      Mammo 5/19/20   Impression:  Bilateral  There is no mammographic evidence of malignancy.   BI-RADS Category:   Overall: 2 - Benign        [facility], negative Integrated BRACAnalysis with [facility]      Risk Factors/Breast History Age of Menarche: 10y.o. Age of Menopause: 48. LMP 2016. History of Hormonal Therapy: OCPs x 4 years. No HRT.Nulliparous Family History of Breast Ca: Maternal aunt (age 60s). Maternal aunt (age 70s). Paternal aunt (age  70s).   No Family hx of Ovarian Cancer. Other Family History: Father with pancreatic cancer.         Social History .She is a chronic smoker. She has smoked 1ppd x 25 yrs. She drinks 2 beers/week.          Past Medical History:   Diagnosis Date    Allergy     Breast cyst     DCIS (ductal carcinoma in situ)     Left    Endometriosis     Papilloma of breast     Reflux          Past Surgical History:   Procedure Laterality Date    BREAST BIOPSY Left     papilloma    BREAST CYST ASPIRATION Left     BREAST LUMPECTOMY      2 lumpectomy    diagnostic lap      TONSILLECTOMY         Review of Systems   Constitutional: Negative for appetite change, fatigue, fever and unexpected weight change.   HENT: Negative for mouth sores.    Eyes: Negative for visual disturbance.   Respiratory: Negative for cough and shortness of breath.    Cardiovascular: Negative for chest pain.   Gastrointestinal: Negative for abdominal pain and diarrhea.   Genitourinary: Negative for frequency.   Musculoskeletal: Negative for back pain.   Skin: Negative for rash.   Neurological: Negative for headaches.   Hematological: Negative for adenopathy.   Psychiatric/Behavioral: The patient is not nervous/anxious.        Objective:        PE deferred   Televisit           Lab Results   Component Value Date    WBC 7.97 06/12/2020    HGB 13.6 06/12/2020    HCT 43.2 06/12/2020    MCV 90 06/12/2020     06/12/2020     Results for STEFFANIE JIMENEZ (MRN [id number]) as of 1/10/2019 09:20   Ref. Range 12/6/2018 10:27   Estradiol Latest Ref Range: See Text pg/mL <10 (A)   Testosterone, Free Latest Units: pg/mL 1.8     FINAL PATHOLOGIC DIAGNOSIS 9/21/2018   Left breast, wire localized lumpectomy:  -Ductal carcinoma in situ (DCIS), Grade 2 (intermediate), focally involving the medial margin, present in 5  blocks with an estimated size of at least 20 mm (see cancer case summary below)  -Background breast tissue with fibrocystic change including dense  fibrosis, papillomatosis, adenosis, cystic  dilatation, apocrine metaplasia, and fibroadenomatoid nodules  -Biopsy site changes  -No evidence of invasive carcinoma  Surgical pathology cancer case summary:  -Procedure: Excision (less than total mastectomy)  -Specimen laterality: Left  -Size (extent) of DCIS: Estimated size of DCIS: At least 20 mm: Number blocks with DCIS: 5  -Histologic type: Ductal carcinoma in situ  -Architectural patterns: Solid  -Nuclear grade: Grade 2 (intermediate)  -Necrosis: Not identified  -Margins: Medial margin focally positive for DCIS  -Regional lymph nodes: No lymph nodes submitted or found  -Pathologic stage classification (pTNM):  Primary tumor (pT): pTis (DCIS): Ductal carcinoma in situ  Regional lymph nodes (pN): pNX: Regional lymph nodes cannot be assessed  -Microcalcifications: Present in DCIS and nonneoplastic tissue     Hormone receptor results (performed with appropriate controls):  ER - Positive (moderate)  AZ - Positive (moderate)           FINAL PATHOLOGIC DIAGNOSIS  11/16/2018    1. LEFT BREAST, RE-EXCISION:  No residual ductal carcinoma in-situ  Lobular carcinoma in-situ  Margins negative for neoplasia or malignancy (closest margins are medial and inferior - 1mm, superior 6mm)  Atypical lobular hyperplasia  Intraductal papilloma  Negative for malignancy  Extensive previous procedure related changes present  (specimen has been submitted in it's entirety)  2. NEW ANTERIOR MARGIN, EXCISION:  Benign skin and subcutaneous tissue with extensive inflammation and procedure related changes  Negative for neoplasia or malignancy  (specimen has been submitted in its entirety)  Part1.  Immunostain E-Cadherin has been performed (with appropriate controls) on two sections #1C AND 1G and is  negative, supporting the above diagnosis.  Hormone receptors performed for LCIS (Part 1)  ER - Positive (90% of the tumor nuclei, moderate to strong)  AZ - Positive (30% of the tumor nuclei,  weak)  HER2 - Indeterminate (2+), specimen will be sent out for FISH analysis  Ki67 - 5%, positive tumor nuclei  [id number],ER,PGR            Bone Density  1/18/2018   Osteopenia.  Ten year probability of major osteoporotic fracture is 3.9%, and hip fracture is 0.2%.      Results for STEFFANIE JIMENEZ (MRN [id number]) as of 6/13/2019 08:27   Ref. Range 4/8/2019 10:08 6/10/2019 08:19   Vit D, 25-Hydroxy Latest Ref Range: 30 - 96 ng/mL 45 52     Mammo diagnostic bilateral 4/23/2019  BI-RADS Category:   Overall: 2 - Benign    Assessment:       1. Ductal carcinoma in situ (DCIS) of left breast    2. Encounter for monitoring aromatase inhibitor therapy    3. Osteopenia, unspecified location    4. Hot flashes related to aromatase inhibitor therapy        Plan:       1-4   Patient is a 50 y.o. female with Stage 0 pTis N0 M0 grade 2 ER + AK + DCIS of the left breast status post left partial mastectomy with focally positive medial margin s/p re-excison on 11/16/18. Final pathology showed LCIS but no residual DCIS with negative with closest margins are medial and inferior - 1mm   S/p  adjuvant XRT  under the direction of Rad/Onc at [facility] completed 1/25/2019   [facility], negative Integrated BRACAnalysis with [facility]   Cont with Arimidex    Bone density  1/10/2019 reveals osteopenia  Cont Ca and Vit D   Consider bisphosphonate therapy   Plan bone density every 1  To 2 years   Continue weight bearing exercises  Avoidance of smoking  Follow-up done density prior to f/u     Continue monthly SBE  She will continue with monthly self-breast exams    Mammo 5/19/20   Impression:  Bilateral  There is no mammographic evidence of malignancy.   BI-RADS Category:   Overall: 2 - Benign    MRI  breast 10/2019 BI-RADS Category: Overall: 2 - Benign  Impression:  Bilateral  There is no mammographic evidence of malignancy.     BI-RADS Category:   Overall: 2 - Benign    Cont  Clonidine 0.1mg po bid prn hot flashes     Plan follow-up bone  density     She will follow-up in 3  mos    .    Advance Care Planning     Power of   I previously  initiated the process of advance care planning today and explained the importance of this process to the patient.  I introduced the concept of advance directives to the patient, as well. Then the patient received detailed information about the importance of designating a Health Care Power of  (HCPOA). She was also instructed to communicate with this person about their wishes for future healthcare, should she become sick and lose decision-making capacity. The patient has not previously appointed a HCPOA. After our discussion, the patient has decided to complete a HCPOA and has appointed her brother  Xiang Trinidad [phone] and NAME:  Leon Lay [phone]  I spent a total time of 16  minutes discussing this issue with the patient.    Advance Care Planning     Living Will  During previous visit, I engaged the patient in the advance care planning process.  The patient and I reviewed the role for advance directives and their purpose in directing future healthcare if the patient's unable to speak for him/herself.  At this point in time, the patient does have full decision-making capacity.  We discussed different extreme health states that she could experience, and reviewed what kind of medical care she would want in those situations.  The patient communicated that if she were comatose and had little chance of a meaningful recovery, she would not want machines/life-sustaining treatments used.    The patient has completed a living will to reflect these preferences.  The patient  has already designated a healthcare power of  to make decisions on her behalf.   I spent a total of  16  minutes engaging the patient in this advance care planning discussion.           Cc: MD Maryjo Pagan MD

## 2020-06-17 DIAGNOSIS — M79.671 RIGHT FOOT PAIN: Primary | ICD-10-CM

## 2020-06-18 ENCOUNTER — OFFICE VISIT (OUTPATIENT)
Dept: ORTHOPEDICS | Facility: CLINIC | Age: 50
End: 2020-06-18
Payer: MEDICAID

## 2020-06-18 VITALS
OXYGEN SATURATION: 97 % | HEART RATE: 82 BPM | DIASTOLIC BLOOD PRESSURE: 79 MMHG | WEIGHT: 150.56 LBS | BODY MASS INDEX: 30.35 KG/M2 | HEIGHT: 59 IN | RESPIRATION RATE: 18 BRPM | SYSTOLIC BLOOD PRESSURE: 113 MMHG

## 2020-06-18 DIAGNOSIS — S92.244A NONDISPLACED FRACTURE OF MEDIAL CUNEIFORM OF RIGHT FOOT, INITIAL ENCOUNTER FOR CLOSED FRACTURE: Primary | ICD-10-CM

## 2020-06-18 DIAGNOSIS — S92.909S CLOSED FRACTURE OF FOOT, UNSPECIFIED LATERALITY, SEQUELA: ICD-10-CM

## 2020-06-18 PROCEDURE — 99214 OFFICE O/P EST MOD 30 MIN: CPT | Mod: PBBFAC,25,PN | Performed by: ORTHOPAEDIC SURGERY

## 2020-06-18 PROCEDURE — 99203 PR OFFICE/OUTPT VISIT, NEW, LEVL III, 30-44 MIN: ICD-10-PCS | Mod: S$PBB,,, | Performed by: ORTHOPAEDIC SURGERY

## 2020-06-18 PROCEDURE — 99999 PR PBB SHADOW E&M-EST. PATIENT-LVL IV: CPT | Mod: PBBFAC,,, | Performed by: ORTHOPAEDIC SURGERY

## 2020-06-18 PROCEDURE — 99999 PR PBB SHADOW E&M-EST. PATIENT-LVL IV: ICD-10-PCS | Mod: PBBFAC,,, | Performed by: ORTHOPAEDIC SURGERY

## 2020-06-18 PROCEDURE — 99203 OFFICE O/P NEW LOW 30 MIN: CPT | Mod: S$PBB,,, | Performed by: ORTHOPAEDIC SURGERY

## 2020-06-18 NOTE — PROGRESS NOTES
Chief Complaint   Patient presents with    Right Foot - Pain, Swelling       This patient was seen in consultation at the request of Dr. Parth Wolfe     Initial visit (06/18/2020): Lou Jc is a 50 y.o. female who presents today complaining of Pain and Swelling of the Right Foot     Fell off a chair in March and went to ER - was told she had a sprain and then was called and told she had a fracture.  She does not know what was broken.   She has not been able to see anyone since then.   Has been in normal shoes. Occ pain. Has occ popping. No NT. Mild swelling with prolonged weight bearing.  She is here to make sure it is healing ok. Pain is localized to medial arch of the foot.     This is the extent of the patient's complaints at this time.     Occupation: not employed     Review of Systems   All other systems reviewed and are negative.        Review of patient's allergies indicates:  No Known Allergies      Current Outpatient Medications:     anastrozole (ARIMIDEX) 1 mg Tab, Take 1 tablet by mouth once daily, Disp: 30 tablet, Rfl: 6    aspirin-acetaminophen-caffeine 250-250-65 mg (EXCEDRIN MIGRAINE) 250-250-65 mg per tablet, Take 1 tablet by mouth every 6 (six) hours as needed for Pain., Disp: , Rfl: 0    calcium-vitamin D3 (CALCIUM 500 + D) 500 mg(1,250mg) -200 unit per tablet, Take 1 tablet by mouth 2 (two) times daily with meals., Disp: , Rfl:     clonazePAM (KLONOPIN) 1 MG tablet, TAKE 1 TABLET BY MOUTH IN THE EVENING, Disp: 30 tablet, Rfl: 1    cloNIDine (CATAPRES) 0.1 MG tablet, TAKE 1 TABLET BY MOUTH TWICE DAILY AS NEEDED HOT  FLASHES, Disp: 60 tablet, Rfl: 0    HYDROcodone-acetaminophen (NORCO) 5-325 mg per tablet, Take 1 tablet by mouth every 4 (four) hours as needed for Pain., Disp: 22 tablet, Rfl: 0    loratadine (CLARITIN) 10 mg tablet, Take 1 tablet by mouth once daily, Disp: 30 tablet, Rfl: 0    naproxen (NAPROSYN) 500 MG tablet, Take 1 tablet (500 mg total) by mouth 2 (two)  times daily with meals., Disp: 90 tablet, Rfl: 1    ondansetron (ZOFRAN) 4 MG tablet, Take 1 tablet (4 mg total) by mouth every 8 (eight) hours as needed for Nausea., Disp: 60 tablet, Rfl: 2    pantoprazole (PROTONIX) 40 MG tablet, Take 1 tablet (40 mg total) by mouth once daily., Disp: 90 tablet, Rfl: 1    Past Medical History:   Diagnosis Date    Allergy     Breast cyst     DCIS (ductal carcinoma in situ)     Left    Endometriosis     Papilloma of breast     Reflux        Patient Active Problem List   Diagnosis    Menstrual bleeding problem    Tobacco use    Sprain    Papilloma of breast    Ductal carcinoma in situ (DCIS) of left breast    History of radiation therapy lt breast completed 2019    History of partial mastectomy of left breast       Past Surgical History:   Procedure Laterality Date    BREAST BIOPSY Left     papilloma    BREAST CYST ASPIRATION Left     BREAST LUMPECTOMY      2 lumpectomy    diagnostic lap      TONSILLECTOMY         Social History     Tobacco Use    Smoking status: Former Smoker     Packs/day: 0.50     Years: 20.00     Pack years: 10.00     Types: Cigarettes     Quit date: 2017     Years since quittin.7    Smokeless tobacco: Never Used   Substance Use Topics    Alcohol use: Yes     Alcohol/week: 2.0 standard drinks     Types: 2 Cans of beer per week    Drug use: Yes     Types: Marijuana       Family History   Problem Relation Age of Onset    Diabetes Mother     Heart disease Mother     COPD Mother     Hypertension Mother     Kidney disease Mother     Cancer Mother     Throat cancer Mother     Depression Mother     Hearing loss Mother     Heart disease Father     Cancer Father         Pancreatic    Diabetes Father     Bone cancer Father     Breast cancer Maternal Aunt     Breast cancer Paternal Aunt     Breast cancer Maternal Aunt     Colon cancer Neg Hx     Ovarian cancer Neg Hx        Physical Exam:   Vitals:    20 0824  "  BP: 113/79   Pulse: 82   Resp: 18   SpO2: 97%   Weight: 68.3 kg (150 lb 9.2 oz)   Height: 4' 11" (1.499 m)   PainSc:   3   PainLoc: Foot       General: Weight: 68.3 kg (150 lb 9.2 oz) Body mass index is 30.41 kg/m².   Patient is alert, awake and oriented to time, place and person. Mood and affect are appropriate.  Patient does not appear to be in any distress, denies any constitutional symptoms and appears stated age.   HEENT:  Pupils are equal and round, sclera are not injected. External examination of ears and nose reveals no abnormalities. Cranial nerves II-X are grossly intact  Skin:  no rashes, abrasions or open wounds on the affected extremity   Resp:  No respiratory distress or audible wheezing   CV: 2+  pulses, all extremities warm and well perfused   Right Foot    Mild pain over medial cuneiform   No swelling, ecchymosis or wounds   Ltsi s/s/sp/dp/t  + ehl/fhl/ta/gs  2+ DP       Imaging: 3 views right foot: minimally displaced fx of medial cuneiform. No images for comparison. No significant callus noted but cannot compare to previous imaging to detect minimal change     I personally reviewed and interpreted the patient's imaging obtained today in clinic     Assessment: 50 y.o. female with right medial cuneiform fracture     I explained my diagnostic impression and the reasoning behind it in detail, using layman's terms.  Models and/or pictures were used to help in the explanation.      Plan:   - Activity as tolerated   - Return to clinic in 6 weeks with XR. Return sooner if symptoms worsen or fail to improve.    All questions were answered in detail. The patient is in full agreement with the treatment plan and will proceed accordingly.    A note notifying Dr. Parth Wolfe of my findings was sent via the electronic medical record     This note was created by combination of typed  and M-Modal dictation. Transcription and phonetic errors may be present.  If there are any questions, please " contact me.

## 2020-06-18 NOTE — LETTER
June 18, 2020      Parth Wolfe MD  [address] [facility]  [place] [address]           [facility]  [address]  [facility] [place] [address]  Phone: [phone]          Patient: Lou Jc   MR Number: [id number]   YOB: 1970   Date of Visit: 6/18/2020       Dear Dr. Parth Wolfe:    Thank you for referring Lou Jc to me for evaluation. Attached you will find relevant portions of my assessment and plan of care.    If you have questions, please do not hesitate to call me. I look forward to following Lou Jc along with you.    Sincerely,    Anais Arora MD    Enclosure  CC:  No Recipients    If you would like to receive this communication electronically, please contact [email or web address] or [phone] to request more information on [employer] access.    For providers and/or their staff who would like to refer a patient to [facility], please contact us through our one-stop-shop provider referral line, [facility], at [phone].    If you feel you have received this communication in error or would no longer like to receive these types of communications, please e-mail [email or web address]

## 2020-06-26 ENCOUNTER — OFFICE VISIT (OUTPATIENT)
Dept: OBSTETRICS AND GYNECOLOGY | Facility: CLINIC | Age: 50
End: 2020-06-26
Attending: OBSTETRICS & GYNECOLOGY
Payer: MEDICAID

## 2020-06-26 VITALS
SYSTOLIC BLOOD PRESSURE: 102 MMHG | HEIGHT: 59 IN | BODY MASS INDEX: 30.76 KG/M2 | DIASTOLIC BLOOD PRESSURE: 62 MMHG | WEIGHT: 152.56 LBS

## 2020-06-26 DIAGNOSIS — Z01.419 ENCOUNTER FOR GYNECOLOGICAL EXAMINATION WITHOUT ABNORMAL FINDING: ICD-10-CM

## 2020-06-26 DIAGNOSIS — Z92.3 HISTORY OF RADIATION THERAPY: ICD-10-CM

## 2020-06-26 DIAGNOSIS — Z85.3 HISTORY OF BREAST CANCER: ICD-10-CM

## 2020-06-26 DIAGNOSIS — R07.9 CHEST PAIN, UNSPECIFIED TYPE: ICD-10-CM

## 2020-06-26 DIAGNOSIS — N89.8 VAGINAL ITCHING: Primary | ICD-10-CM

## 2020-06-26 DIAGNOSIS — Z87.891 HISTORY OF SMOKING 10-25 PACK YEARS: ICD-10-CM

## 2020-06-26 PROCEDURE — 99396 PREV VISIT EST AGE 40-64: CPT | Mod: S$GLB,,, | Performed by: OBSTETRICS & GYNECOLOGY

## 2020-06-26 PROCEDURE — 99396 PR PREVENTIVE VISIT,EST,40-64: ICD-10-PCS | Mod: S$GLB,,, | Performed by: OBSTETRICS & GYNECOLOGY

## 2020-06-26 RX ORDER — CONJUGATED ESTROGENS 0.62 MG/G
1 CREAM VAGINAL
Qty: 45 G | Refills: 12 | Status: SHIPPED | OUTPATIENT
Start: 2020-06-29 | End: 2022-02-28

## 2020-06-26 RX ORDER — ERGOCALCIFEROL 1.25 MG/1
CAPSULE ORAL
COMMUNITY
Start: 2020-05-19 | End: 2021-03-16 | Stop reason: SDUPTHER

## 2020-06-26 RX ORDER — CLOBETASOL PROPIONATE 0.5 MG/G
OINTMENT TOPICAL 2 TIMES DAILY
Qty: 30 G | Refills: 1 | Status: SHIPPED | OUTPATIENT
Start: 2020-06-26

## 2020-06-26 NOTE — PROGRESS NOTES
SUBJECTIVE:   50 y.o. female   for annual routine checkup. Patient's last menstrual period was 2016..  She has a history of stage 0 ER positive HI positive ductal carcinoma of the left breast..  She underwent left partial mastectomy 2018 and then a re-excision in 2018.  She underwent radiation is now on Arimidex  She takes Catapres for the hot flashes and she reports that these are under control  She does report chest pain that comes and goes.  She reports it can happen when she is just sitting and is not painful but she notices it.  She is a 25 pack year smoker and quit smoking 6 years ago.  Her mother had congestive heart failure  Of note patient's last hemoglobin A1c was 5.8  She reports that she quit using the Impact C because it was expensive  She reports terrible vaginal itching mainly on the left side-she reports that she feels sore from scratching  Sex is extremely painful and it is very infrequent secondary to issues with her .        Past Medical History:   Diagnosis Date    Allergy     Breast cyst     DCIS (ductal carcinoma in situ)     Left    Endometriosis     Papilloma of breast     Reflux      Past Surgical History:   Procedure Laterality Date    BREAST BIOPSY Left     papilloma    BREAST CYST ASPIRATION Left     BREAST LUMPECTOMY      2 lumpectomy    diagnostic lap      TONSILLECTOMY       Social History     Socioeconomic History    Marital status:      Spouse name: Not on file    Number of children: Not on file    Years of education: unknown    Highest education level: Not on file   Occupational History    Not on file   Social Needs    Financial resource strain: Not on file    Food insecurity     Worry: Not on file     Inability: Not on file    Transportation needs     Medical: Not on file     Non-medical: Not on file   Tobacco Use    Smoking status: Former Smoker     Packs/day: 0.50     Years: 20.00     Pack years: 10.00      Types: Cigarettes     Quit date: 2017     Years since quittin.8    Smokeless tobacco: Never Used   Substance and Sexual Activity    Alcohol use: Yes     Alcohol/week: 2.0 standard drinks     Types: 2 Cans of beer per week    Drug use: Yes     Types: Marijuana    Sexual activity: Yes     Partners: Male     Birth control/protection: None   Lifestyle    Physical activity     Days per week: Not on file     Minutes per session: Not on file    Stress: Not on file   Relationships    Social connections     Talks on phone: Not on file     Gets together: Not on file     Attends Restorationist service: Not on file     Active member of club or organization: Not on file     Attends meetings of clubs or organizations: Not on file     Relationship status: Not on file   Other Topics Concern    Are you pregnant or think you may be? Not Asked    Breast-feeding Not Asked   Social History Narrative    Not on file     Family History   Problem Relation Age of Onset    Diabetes Mother     Heart disease Mother     COPD Mother     Hypertension Mother     Kidney disease Mother     Cancer Mother     Throat cancer Mother     Depression Mother     Hearing loss Mother     Heart disease Father     Cancer Father         Pancreatic    Diabetes Father     Bone cancer Father     Breast cancer Maternal Aunt     Breast cancer Paternal Aunt     Breast cancer Maternal Aunt     Colon cancer Neg Hx     Ovarian cancer Neg Hx      OB History    Para Term  AB Living   0 0 0 0 0 0   SAB TAB Ectopic Multiple Live Births   0 0 0 0             Current Outpatient Medications   Medication Sig Dispense Refill    anastrozole (ARIMIDEX) 1 mg Tab Take 1 tablet by mouth once daily 30 tablet 6    aspirin-acetaminophen-caffeine 250-250-65 mg (EXCEDRIN MIGRAINE) 250-250-65 mg per tablet Take 1 tablet by mouth every 6 (six) hours as needed for Pain.  0    calcium-vitamin D3 (CALCIUM 500 + D) 500 mg(1,250mg) -200 unit per  tablet Take 1 tablet by mouth 2 (two) times daily with meals.      clonazePAM (KLONOPIN) 1 MG tablet TAKE 1 TABLET BY MOUTH IN THE EVENING 30 tablet 1    cloNIDine (CATAPRES) 0.1 MG tablet TAKE 1 TABLET BY MOUTH TWICE DAILY AS NEEDED HOT  FLASHES 60 tablet 0    loratadine (CLARITIN) 10 mg tablet Take 1 tablet by mouth once daily 30 tablet 0    naproxen (NAPROSYN) 500 MG tablet Take 1 tablet (500 mg total) by mouth 2 (two) times daily with meals. 90 tablet 1    ondansetron (ZOFRAN) 4 MG tablet Take 1 tablet (4 mg total) by mouth every 8 (eight) hours as needed for Nausea. 60 tablet 2    pantoprazole (PROTONIX) 40 MG tablet Take 1 tablet (40 mg total) by mouth once daily. 90 tablet 1    VITAMIN D2 1,250 mcg (50,000 unit) capsule       clobetasol 0.05% (TEMOVATE) 0.05 % Oint Apply topically 2 (two) times daily. 30 g 1    [START ON 6/29/2020] conjugated estrogens (PREMARIN) vaginal cream Place 1 g vaginally twice a week. 45 g 12     No current facility-administered medications for this visit.      Allergies: Patient has no known allergies.     The 10-year ASCVD risk score (Erin JAME Jr., et al., 2013) is: 0.7%    Values used to calculate the score:      Age: 50 years      Sex: Female      Is Non- : No      Diabetic: No      Tobacco smoker: No      Systolic Blood Pressure: 102 mmHg      Is BP treated: No      HDL Cholesterol: 74 mg/dL      Total Cholesterol: 194 mg/dL      ROS:  Constitutional: no weight loss, weight gain, fever, fatigue  Eyes:  No vision changes, glasses/contacts  ENT/Mouth: No ulcers, sinus problems, ears ringing, headache  Cardiovascular: No inability to lie flat,+ chest pain, exercise intolerance, swelling, heart palpitations  Respiratory: No wheezing, coughing blood, shortness of breath, or cough  Gastrointestinal: No diarrhea, bloody stool, nausea/vomiting, constipation, gas, hemorrhoids  Genitourinary: No blood in urine, painful urination, urgency of urination,  frequency of urination, incomplete emptying, incontinence, abnormal bleeding, painful periods, heavy periods, vaginal discharge, vaginal odor, +painful intercourse, sexual problems, bleeding after intercourse, +vaginal itching.  Musculoskeletal: No muscle weakness  Skin/Breast: +breast cancer  Neurological: No passing out, seizures, numbness, headache  Endocrine: No diabetes, hypothyroid, hyperthyroid,+ hot flashes, hair loss, abnormal hair growth, acne  Psychiatric: No depression, crying  Hematologic: No bruises, bleeding, swollen lymph nodes, anemia.      Physical Exam:   Constitutional: She is oriented to person, place, and time. She appears well-developed and well-nourished.      Neck: Normal range of motion. No tracheal deviation present. No thyromegaly present.    Cardiovascular: Exam reveals no edema.     Pulmonary/Chest: Effort normal. She exhibits no mass, no tenderness, no deformity and no retraction. Right breast exhibits no inverted nipple, no mass, no nipple discharge, no skin change, no tenderness, presence, no bleeding and no swelling. Left breast exhibits no inverted nipple, no mass, no nipple discharge, no skin change, no tenderness, presence, no bleeding and no swelling. Breasts are symmetrical.        Abdominal: Soft. She exhibits no distension and no mass. There is no abdominal tenderness. There is no rebound and no guarding. No hernia. Hernia confirmed negative in the left inguinal area.     Genitourinary:    Vagina and uterus normal.   Rectum:      No external hemorrhoid.   There is no rash, tenderness or lesion on the right labia. There is no rash, tenderness or lesion on the left labia. Uterus is not deviated. Cervix is normal. No no adexnal prolapse. Right adnexum displays no mass, no tenderness and no fullness. Left adnexum displays no mass, no tenderness and no fullness. No tenderness, bleeding, rectocele, cystocele or unspecified prolapse of vaginal walls in the vagina. Cervix exhibits no  motion tenderness, no discharge and no friability.           Musculoskeletal: Normal range of motion and moves all extremeties. No edema.       Neurological: She is alert and oriented to person, place, and time.    Skin: No rash noted. No erythema. No pallor.    Psychiatric: She has a normal mood and affect. Her behavior is normal. Judgment and thought content normal.     +white skin changes left and right superior vulva  +narrowing vaginally- pain with speculum insertion  +atrophy    ASSESSMENT:   well woman  Vaginal itching  Vaginal atrophy  Dyspareunia  History of breast cancer  Chest pain  History of radiation  PLAN:   RTC in 1- 2months-will treat with clobetasol and Premarin vaginal cream  Consult placed for cardiology evaluation secondary to-history of smoking, radiation exposure, and chest pain  Number given for Dr. Cueva for patient's -4 min sexual health

## 2020-07-14 ENCOUNTER — TELEPHONE (OUTPATIENT)
Dept: CARDIOLOGY | Facility: CLINIC | Age: 50
End: 2020-07-14

## 2020-07-14 DIAGNOSIS — R07.9 CHEST PAIN, UNSPECIFIED TYPE: Primary | ICD-10-CM

## 2020-07-29 ENCOUNTER — OFFICE VISIT (OUTPATIENT)
Dept: CARDIOLOGY | Facility: CLINIC | Age: 50
End: 2020-07-29
Payer: MEDICAID

## 2020-07-29 ENCOUNTER — HOSPITAL ENCOUNTER (OUTPATIENT)
Dept: CARDIOLOGY | Facility: CLINIC | Age: 50
Discharge: HOME | End: 2020-07-29
Payer: MEDICAID

## 2020-07-29 VITALS
DIASTOLIC BLOOD PRESSURE: 75 MMHG | SYSTOLIC BLOOD PRESSURE: 121 MMHG | BODY MASS INDEX: 30.4 KG/M2 | WEIGHT: 150.81 LBS | HEART RATE: 76 BPM | OXYGEN SATURATION: 98 % | HEIGHT: 59 IN

## 2020-07-29 DIAGNOSIS — D05.12 DUCTAL CARCINOMA IN SITU (DCIS) OF LEFT BREAST: ICD-10-CM

## 2020-07-29 DIAGNOSIS — R73.03 PRE-DIABETES: ICD-10-CM

## 2020-07-29 DIAGNOSIS — I25.10 ATHEROSCLEROSIS OF NATIVE CORONARY ARTERY OF NATIVE HEART WITHOUT ANGINA PECTORIS: ICD-10-CM

## 2020-07-29 DIAGNOSIS — R07.9 CHEST PAIN, UNSPECIFIED TYPE: ICD-10-CM

## 2020-07-29 DIAGNOSIS — Z87.891 HISTORY OF SMOKING 10-25 PACK YEARS: ICD-10-CM

## 2020-07-29 DIAGNOSIS — Z72.0 TOBACCO USE: Primary | ICD-10-CM

## 2020-07-29 DIAGNOSIS — Z85.3 HISTORY OF BREAST CANCER: ICD-10-CM

## 2020-07-29 DIAGNOSIS — Z92.3 HISTORY OF RADIATION THERAPY: ICD-10-CM

## 2020-07-29 PROCEDURE — 99204 OFFICE O/P NEW MOD 45 MIN: CPT | Mod: S$PBB,,, | Performed by: INTERNAL MEDICINE

## 2020-07-29 PROCEDURE — 93010 EKG 12-LEAD: ICD-10-PCS | Mod: S$PBB,,, | Performed by: INTERNAL MEDICINE

## 2020-07-29 PROCEDURE — 93005 ELECTROCARDIOGRAM TRACING: CPT | Mod: PBBFAC | Performed by: INTERNAL MEDICINE

## 2020-07-29 PROCEDURE — 99204 PR OFFICE/OUTPT VISIT, NEW, LEVL IV, 45-59 MIN: ICD-10-PCS | Mod: S$PBB,,, | Performed by: INTERNAL MEDICINE

## 2020-07-29 PROCEDURE — 93010 ELECTROCARDIOGRAM REPORT: CPT | Mod: S$PBB,,, | Performed by: INTERNAL MEDICINE

## 2020-07-29 PROCEDURE — 99999 PR PBB SHADOW E&M-EST. PATIENT-LVL IV: CPT | Mod: PBBFAC,,, | Performed by: INTERNAL MEDICINE

## 2020-07-29 PROCEDURE — 99999 PR PBB SHADOW E&M-EST. PATIENT-LVL IV: ICD-10-PCS | Mod: PBBFAC,,, | Performed by: INTERNAL MEDICINE

## 2020-07-29 PROCEDURE — 99214 OFFICE O/P EST MOD 30 MIN: CPT | Mod: PBBFAC | Performed by: INTERNAL MEDICINE

## 2020-07-29 RX ORDER — IBUPROFEN 800 MG/1
TABLET ORAL EVERY 6 HOURS PRN
COMMUNITY
Start: 2020-07-27

## 2020-07-29 RX ORDER — AMOXICILLIN 500 MG/1
500 CAPSULE ORAL EVERY 8 HOURS
COMMUNITY
Start: 2020-07-27 | End: 2021-12-03 | Stop reason: SDUPTHER

## 2020-07-29 NOTE — LETTER
July 29, 2020      Erna Davis MD  [address]  [address]  [facility] [address]           [facility] - Cardiology  [address]  [place] [address]  Phone: [phone]          Patient: Lou Jc   MR Number: [id number]   YOB: 1970   Date of Visit: 7/29/2020       Dear Dr. Erna Davis:    Thank you for referring Lou Jc to me for evaluation. Attached you will find relevant portions of my assessment and plan of care.    If you have questions, please do not hesitate to call me. I look forward to following Lou Jc along with you.    Sincerely,    Tanja Brito MD    Enclosure  CC:  No Recipients    If you would like to receive this communication electronically, please contact [email or web address] or [phone] to request more information on "[employer] Link access.    For providers and/or their staff who would like to refer a patient to [facility], please contact us through our one-stop-shop provider referral line, [facility], at [phone].    If you feel you have received this communication in error or would no longer like to receive these types of communications, please e-mail [email or web address]

## 2020-07-29 NOTE — PROGRESS NOTES
Subjective:   Patient ID:  Lou Jc is a 50 y.o. female is a new patient who presents for evaluation of Chest pain, unspecified type; History of breast cancer; History of smoking 10-25 pack years; and History of radiation therapy  50 y.o. female   for annual routine checkup. Patient's last menstrual period was 2016..  She has a history of stage 0 ER positive OR positive ductal carcinoma of the left breast..  She underwent left partial mastectomy 2018 and then a re-excision in 2018.  She underwent radiation is now on Arimidex  She takes Catapres for the hot flashes and she reports that these are under control  She does report chest pain that comes and goes.  She reports it can happen when she is just sitting and is not painful but she notices it.  She is a 25 pack year smoker and quit smoking 6 years ago.  Her mother had congestive heart failure  Of note patient's last hemoglobin A1c was 5.8  She reports that she quit using the Impact C because it was expensive  She reports terrible vaginal itching mainly on the left side-she reports that she feels sore from scratching  Sex is extremely painful and it is very infrequent secondary to issues with her .    HPI:   No chest pain, Orthopnea, PND of heart failure symptoms. Occasional SOB on wearing the mask.   No chest pain, Orthopnea, PND of heart failure symptoms.   Patient does not get palpitations.   Patient does sits up and cuts grass.  Patient had ER OR +jayden breast cancer and prior 25 pack year smoker quit 6 years ago.     Patient Active Problem List   Diagnosis    Menstrual bleeding problem    Tobacco use    Sprain    Papilloma of breast    Ductal carcinoma in situ (DCIS) of left breast    History of radiation therapy lt breast completed 2019    History of partial mastectomy of left breast    Pre-diabetes     /75 (BP Location: Right arm, Patient Position: Sitting, BP Method: Large  "(Automatic))   Pulse 76   Ht 4' 11" (1.499 m)   Wt 68.4 kg (150 lb 12.7 oz)   LMP 06/27/2016   SpO2 98%   BMI 30.46 kg/m²   Body mass index is 30.46 kg/m².  CrCl cannot be calculated (Patient's most recent lab result is older than the maximum 7 days allowed.).    Lab Results   Component Value Date     06/12/2020    K 4.0 06/12/2020     06/12/2020    CO2 25 06/12/2020    BUN 17 06/12/2020    CREATININE 0.9 06/12/2020     (H) 06/12/2020    HGBA1C 5.8 (H) 08/20/2019    AST 22 06/12/2020    ALT 37 06/12/2020    ALBUMIN 4.3 06/12/2020    PROT 7.6 06/12/2020    BILITOT 0.3 06/12/2020    WBC 7.97 06/12/2020    HGB 13.6 06/12/2020    HCT 43.2 06/12/2020    MCV 90 06/12/2020     06/12/2020    TSH 1.490 08/20/2019    CHOL 194 08/20/2019    HDL 74 08/20/2019    LDLCALC 104 (H) 08/20/2019    TRIG 81 08/20/2019       Current Outpatient Medications   Medication Sig    amoxicillin (AMOXIL) 500 MG capsule 500 mg every 8 (eight) hours.     anastrozole (ARIMIDEX) 1 mg Tab Take 1 tablet by mouth once daily    aspirin-acetaminophen-caffeine 250-250-65 mg (EXCEDRIN MIGRAINE) 250-250-65 mg per tablet Take 1 tablet by mouth every 6 (six) hours as needed for Pain.    calcium-vitamin D3 (CALCIUM 500 + D) 500 mg(1,250mg) -200 unit per tablet Take 1 tablet by mouth 2 (two) times daily with meals.    clobetasol 0.05% (TEMOVATE) 0.05 % Oint Apply topically 2 (two) times daily.    clonazePAM (KLONOPIN) 1 MG tablet TAKE 1 TABLET BY MOUTH IN THE EVENING    cloNIDine (CATAPRES) 0.1 MG tablet TAKE 1 TABLET BY MOUTH TWICE DAILY AS NEEDED FOR HOT FLASHES    conjugated estrogens (PREMARIN) vaginal cream Place 1 g vaginally twice a week.    ibuprofen (ADVIL,MOTRIN) 800 MG tablet every 6 (six) hours as needed.     loratadine (CLARITIN) 10 mg tablet Take 1 tablet by mouth once daily    naproxen (NAPROSYN) 500 MG tablet Take 1 tablet (500 mg total) by mouth 2 (two) times daily with meals.    ondansetron (ZOFRAN) " 4 MG tablet Take 1 tablet (4 mg total) by mouth every 8 (eight) hours as needed for Nausea.    pantoprazole (PROTONIX) 40 MG tablet Take 1 tablet (40 mg total) by mouth once daily.    VITAMIN D2 1,250 mcg (50,000 unit) capsule TAKE 1 CAPSULE BY MOUTH ONCE A WEEK EVERY  7  DAYS    VITAMIN D2 1,250 mcg (50,000 unit) capsule      No current facility-administered medications for this visit.        Review of Systems   Constitution: Negative for chills, decreased appetite, malaise/fatigue, night sweats, weight gain and weight loss.   Eyes: Negative for blurred vision, double vision, visual disturbance and visual halos.   Cardiovascular: Negative for chest pain, claudication, cyanosis, dyspnea on exertion, irregular heartbeat, leg swelling, near-syncope, orthopnea, palpitations, paroxysmal nocturnal dyspnea and syncope.   Respiratory: Negative for cough, hemoptysis, snoring, sputum production and wheezing.    Endocrine: Negative for cold intolerance, heat intolerance, polydipsia and polyphagia.   Hematologic/Lymphatic: Negative for adenopathy and bleeding problem. Does not bruise/bleed easily.   Skin: Negative for flushing, itching, poor wound healing and rash.   Musculoskeletal: Negative for arthritis, back pain, falls, gout, joint pain, joint swelling, muscle cramps, muscle weakness, myalgias, neck pain and stiffness.   Gastrointestinal: Negative for bloating, abdominal pain, anorexia, diarrhea, dysphagia, excessive appetite, flatus, hematemesis, jaundice, melena and nausea.   Genitourinary: Negative for hesitancy and incomplete emptying.   Neurological: Negative for aphonia, brief paralysis, difficulty with concentration, disturbances in coordination, excessive daytime sleepiness, dizziness, focal weakness, light-headedness, loss of balance and weakness.   Psychiatric/Behavioral: Negative for altered mental status, depression, hallucinations, hypervigilance, memory loss, substance abuse and suicidal ideas. The  patient does not have insomnia and is not nervous/anxious.        Objective:   Physical Exam   Constitutional: She is oriented to person, place, and time. She appears well-developed and well-nourished. No distress.   HENT:   Head: Normocephalic and atraumatic.   Nose: Nose normal.   Mouth/Throat: Oropharynx is clear and moist. No oropharyngeal exudate.   Eyes: Pupils are equal, round, and reactive to light. Conjunctivae and EOM are normal. Right eye exhibits no discharge. Left eye exhibits no discharge. No scleral icterus.   Neck: Normal range of motion. Neck supple. No JVD present. No tracheal deviation present. No thyromegaly present.   Cardiovascular: Normal rate, regular rhythm, normal heart sounds and intact distal pulses. Exam reveals no gallop and no friction rub.   No murmur heard.  Pulmonary/Chest: Effort normal and breath sounds normal. No stridor. No respiratory distress. She has no wheezes. She has no rales. She exhibits no tenderness.   Abdominal: Soft. Bowel sounds are normal. She exhibits no distension and no mass. There is no abdominal tenderness. There is no rebound and no guarding.   Musculoskeletal: Normal range of motion.         General: No tenderness or edema.   Lymphadenopathy:     She has no cervical adenopathy.   Neurological: She is alert and oriented to person, place, and time. She has normal reflexes. No cranial nerve deficit. She exhibits normal muscle tone. Coordination normal.   Skin: Skin is warm. No rash noted. She is not diaphoretic. No erythema. No pallor.   Psychiatric: She has a normal mood and affect. Her behavior is normal. Judgment and thought content normal.       Assessment:     1. Tobacco use    2. Pre-diabetes    3. Atherosclerosis of native coronary artery of native heart without angina pectoris    4. Ductal carcinoma in situ (DCIS) of left breast        Plan:   Lou was seen today for chest pain, unspecified type, history of breast cancer, history of smoking 10-25 pack  years and history of radiation therapy.    Diagnoses and all orders for this visit:    Tobacco use  -     Comprehensive metabolic panel; Future    Pre-diabetes    Atherosclerosis of native coronary artery of native heart without angina pectoris  -     CT Calcium Scoring Cardiac; Future  -     Lipid Panel; Future    Ductal carcinoma in situ (DCIS) of left breast      We discussed her cardiac risk in the setting of left sided chest radiation ,will do a calcium score to evaluate her cardiovascular risk as well. Will review her EKG, Encourage diet and exercise.   rtc prn.

## 2020-07-31 ENCOUNTER — TELEPHONE (OUTPATIENT)
Dept: CARDIOLOGY | Facility: CLINIC | Age: 50
End: 2020-07-31

## 2020-08-03 ENCOUNTER — APPOINTMENT (OUTPATIENT)
Dept: RADIOLOGY | Facility: HOSPITAL | Age: 50
End: 2020-08-03
Attending: ORTHOPAEDIC SURGERY
Payer: MEDICAID

## 2020-08-03 ENCOUNTER — OFFICE VISIT (OUTPATIENT)
Dept: ORTHOPEDICS | Facility: CLINIC | Age: 50
End: 2020-08-03
Payer: MEDICAID

## 2020-08-03 VITALS
SYSTOLIC BLOOD PRESSURE: 126 MMHG | OXYGEN SATURATION: 96 % | RESPIRATION RATE: 18 BRPM | HEART RATE: 90 BPM | DIASTOLIC BLOOD PRESSURE: 74 MMHG | BODY MASS INDEX: 30.23 KG/M2 | HEIGHT: 59 IN | WEIGHT: 149.94 LBS

## 2020-08-03 DIAGNOSIS — S92.909S CLOSED FRACTURE OF FOOT, UNSPECIFIED LATERALITY, SEQUELA: ICD-10-CM

## 2020-08-03 DIAGNOSIS — S92.244A NONDISPLACED FRACTURE OF MEDIAL CUNEIFORM OF RIGHT FOOT, INITIAL ENCOUNTER FOR CLOSED FRACTURE: Primary | ICD-10-CM

## 2020-08-03 PROCEDURE — 99999 PR PBB SHADOW E&M-EST. PATIENT-LVL IV: CPT | Mod: PBBFAC,,, | Performed by: ORTHOPAEDIC SURGERY

## 2020-08-03 PROCEDURE — 99999 PR PBB SHADOW E&M-EST. PATIENT-LVL IV: ICD-10-PCS | Mod: PBBFAC,,, | Performed by: ORTHOPAEDIC SURGERY

## 2020-08-03 PROCEDURE — 73630 XR FOOT COMPLETE 3 VIEW RIGHT: ICD-10-PCS | Mod: 26,RT,, | Performed by: RADIOLOGY

## 2020-08-03 PROCEDURE — 73630 X-RAY EXAM OF FOOT: CPT | Mod: 26,RT,, | Performed by: RADIOLOGY

## 2020-08-03 PROCEDURE — 99213 OFFICE O/P EST LOW 20 MIN: CPT | Mod: S$PBB,,, | Performed by: ORTHOPAEDIC SURGERY

## 2020-08-03 PROCEDURE — 73630 X-RAY EXAM OF FOOT: CPT | Mod: TC,FY,PN,RT

## 2020-08-03 PROCEDURE — 99213 PR OFFICE/OUTPT VISIT, EST, LEVL III, 20-29 MIN: ICD-10-PCS | Mod: S$PBB,,, | Performed by: ORTHOPAEDIC SURGERY

## 2020-08-03 PROCEDURE — 99214 OFFICE O/P EST MOD 30 MIN: CPT | Mod: PBBFAC,25,PN | Performed by: ORTHOPAEDIC SURGERY

## 2020-08-03 NOTE — PROGRESS NOTES
Follow up visit    Interval history (08/03/2020): Midfoot pain resolved. Does have some forefoot pain that is worse with WB activity       Initial visit (06/18/2020): Lou Jc is a 50 y.o. female who presents today complaining of Pain and Swelling of the Right Foot     Fell off a chair in March and went to ER - was told she had a sprain and then was called and told she had a fracture.  She does not know what was broken.   She has not been able to see anyone since then.   Has been in normal shoes. Occ pain. Has occ popping. No NT. Mild swelling with prolonged weight bearing.  She is here to make sure it is healing ok. Pain is localized to medial arch of the foot.      This is the extent of the patient's complaints at this time.      Occupation: not employed     Review of Systems   All other systems reviewed and are negative.        Review of patient's allergies indicates:  No Known Allergies       Current Outpatient Medications:     anastrozole (ARIMIDEX) 1 mg Tab, Take 1 tablet by mouth once daily, Disp: 30 tablet, Rfl: 6    aspirin-acetaminophen-caffeine 250-250-65 mg (EXCEDRIN MIGRAINE) 250-250-65 mg per tablet, Take 1 tablet by mouth every 6 (six) hours as needed for Pain., Disp: , Rfl: 0    calcium-vitamin D3 (CALCIUM 500 + D) 500 mg(1,250mg) -200 unit per tablet, Take 1 tablet by mouth 2 (two) times daily with meals., Disp: , Rfl:     clonazePAM (KLONOPIN) 1 MG tablet, TAKE 1 TABLET BY MOUTH IN THE EVENING, Disp: 30 tablet, Rfl: 1    cloNIDine (CATAPRES) 0.1 MG tablet, TAKE 1 TABLET BY MOUTH TWICE DAILY AS NEEDED HOT  FLASHES, Disp: 60 tablet, Rfl: 0    HYDROcodone-acetaminophen (NORCO) 5-325 mg per tablet, Take 1 tablet by mouth every 4 (four) hours as needed for Pain., Disp: 22 tablet, Rfl: 0    loratadine (CLARITIN) 10 mg tablet, Take 1 tablet by mouth once daily, Disp: 30 tablet, Rfl: 0    naproxen (NAPROSYN) 500 MG tablet, Take 1 tablet (500 mg total) by mouth 2 (two) times daily with  meals., Disp: 90 tablet, Rfl: 1    ondansetron (ZOFRAN) 4 MG tablet, Take 1 tablet (4 mg total) by mouth every 8 (eight) hours as needed for Nausea., Disp: 60 tablet, Rfl: 2    pantoprazole (PROTONIX) 40 MG tablet, Take 1 tablet (40 mg total) by mouth once daily., Disp: 90 tablet, Rfl: 1          Past Medical History:   Diagnosis Date    Allergy      Breast cyst      DCIS (ductal carcinoma in situ)       Left    Endometriosis      Papilloma of breast      Reflux              Patient Active Problem List   Diagnosis    Menstrual bleeding problem    Tobacco use    Sprain    Papilloma of breast    Ductal carcinoma in situ (DCIS) of left breast    History of radiation therapy lt breast completed 2019    History of partial mastectomy of left breast              Past Surgical History:   Procedure Laterality Date    BREAST BIOPSY Left       papilloma    BREAST CYST ASPIRATION Left      BREAST LUMPECTOMY         2 lumpectomy    diagnostic lap        TONSILLECTOMY            Social History            Tobacco Use    Smoking status: Former Smoker       Packs/day: 0.50       Years: 20.00       Pack years: 10.00       Types: Cigarettes       Quit date: 2017       Years since quittin.7    Smokeless tobacco: Never Used   Substance Use Topics    Alcohol use: Yes       Alcohol/week: 2.0 standard drinks       Types: 2 Cans of beer per week    Drug use: Yes       Types: Marijuana              Family History   Problem Relation Age of Onset    Diabetes Mother      Heart disease Mother      COPD Mother      Hypertension Mother      Kidney disease Mother      Cancer Mother      Throat cancer Mother      Depression Mother      Hearing loss Mother      Heart disease Father      Cancer Father           Pancreatic    Diabetes Father      Bone cancer Father      Breast cancer Maternal Aunt      Breast cancer Paternal Aunt      Breast cancer Maternal Aunt      Colon cancer Neg Hx       "Ovarian cancer Neg Hx          Physical Exam:     Vitals:    08/03/20 0920   BP: 126/74   Pulse: 90   Resp: 18   SpO2: 96%   Weight: 68 kg (149 lb 14.6 oz)   Height: 4' 11" (1.499 m)   PainSc:   7   PainLoc: Foot         General: Weight: 68.3 kg (150 lb 9.2 oz) Body mass index is 30.41 kg/m².   Patient is alert, awake and oriented to time, place and person. Mood and affect are appropriate.  Patient does not appear to be in any distress, denies any constitutional symptoms and appears stated age.   HEENT:  Pupils are equal and round, sclera are not injected. External examination of ears and nose reveals no abnormalities. Cranial nerves II-X are grossly intact  Skin:  no rashes, abrasions or open wounds on the affected extremity   Resp:  No respiratory distress or audible wheezing   CV: 2+  pulses, all extremities warm and well perfused   Right Foot    Non tender over medial cuneiform   Mild tenderness over 3/4 MT shafts   No swelling, ecchymosis or wounds   Ltsi s/s/sp/dp/t  + ehl/fhl/ta/gs  2+ DP        Imaging: 3 views right foot: minimally displaced fx of medial cuneiform. Appears healed with bridging callus      I personally reviewed and interpreted the patient's imaging obtained today in clinic     Assessment: 50 y.o. female with right medial cuneiform fracture, healed     I explained my diagnostic impression and the reasoning behind it in detail, using layman's terms.  Models and/or pictures were used to help in the explanation.      Plan:   - Activity as tolerated   - Return to clinic PRN     All questions were answered in detail. The patient is in full agreement with the treatment plan and will proceed accordingly.     A note notifying Dr. Parth Wolfe of my findings was sent via the electronic medical record      This note was created by combination of typed  and M-Modal dictation. Transcription and phonetic errors may be present.  If there are any questions, please contact me.       "

## 2020-08-04 DIAGNOSIS — T45.1X5A HOT FLASHES RELATED TO AROMATASE INHIBITOR THERAPY: ICD-10-CM

## 2020-08-04 DIAGNOSIS — R23.2 HOT FLASHES RELATED TO AROMATASE INHIBITOR THERAPY: ICD-10-CM

## 2020-08-04 RX ORDER — CLONIDINE HYDROCHLORIDE 0.1 MG/1
TABLET ORAL
Qty: 60 TABLET | Refills: 0 | Status: SHIPPED | OUTPATIENT
Start: 2020-08-04 | End: 2020-09-07

## 2020-08-16 ENCOUNTER — HOSPITAL ENCOUNTER (EMERGENCY)
Facility: HOSPITAL | Age: 50
Discharge: HOME | End: 2020-08-16
Attending: EMERGENCY MEDICINE
Payer: MEDICAID

## 2020-08-16 VITALS
WEIGHT: 149 LBS | HEART RATE: 62 BPM | SYSTOLIC BLOOD PRESSURE: 132 MMHG | DIASTOLIC BLOOD PRESSURE: 73 MMHG | TEMPERATURE: 98 F | RESPIRATION RATE: 18 BRPM | BODY MASS INDEX: 30.04 KG/M2 | HEIGHT: 59 IN | OXYGEN SATURATION: 99 %

## 2020-08-16 DIAGNOSIS — L28.2 PRURITIC RASH: ICD-10-CM

## 2020-08-16 DIAGNOSIS — R21 MACULOPAPULAR RASH: Primary | ICD-10-CM

## 2020-08-16 PROCEDURE — 63600175 PHARM REV CODE 636 W HCPCS: Performed by: NURSE PRACTITIONER

## 2020-08-16 PROCEDURE — 99284 EMERGENCY DEPT VISIT MOD MDM: CPT | Mod: 25

## 2020-08-16 PROCEDURE — 96372 THER/PROPH/DIAG INJ SC/IM: CPT

## 2020-08-16 PROCEDURE — 25000003 PHARM REV CODE 250: Performed by: NURSE PRACTITIONER

## 2020-08-16 RX ORDER — DEXAMETHASONE SODIUM PHOSPHATE 4 MG/ML
12 INJECTION, SOLUTION INTRA-ARTICULAR; INTRALESIONAL; INTRAMUSCULAR; INTRAVENOUS; SOFT TISSUE
Status: COMPLETED | OUTPATIENT
Start: 2020-08-16 | End: 2020-08-16

## 2020-08-16 RX ORDER — HYDROXYZINE PAMOATE 25 MG/1
50 CAPSULE ORAL
Status: COMPLETED | OUTPATIENT
Start: 2020-08-16 | End: 2020-08-16

## 2020-08-16 RX ORDER — HYDROXYZINE HYDROCHLORIDE 25 MG/1
25 TABLET, FILM COATED ORAL EVERY 6 HOURS PRN
Qty: 15 TABLET | Refills: 0 | Status: SHIPPED | OUTPATIENT
Start: 2020-08-16 | End: 2020-08-24 | Stop reason: SDUPTHER

## 2020-08-16 RX ORDER — PERMETHRIN 50 MG/G
CREAM TOPICAL
Qty: 60 G | Refills: 0 | Status: SHIPPED | OUTPATIENT
Start: 2020-08-16 | End: 2020-08-24 | Stop reason: SDUPTHER

## 2020-08-16 RX ADMIN — DEXAMETHASONE SODIUM PHOSPHATE 12 MG: 4 INJECTION, SOLUTION INTRA-ARTICULAR; INTRALESIONAL; INTRAMUSCULAR; INTRAVENOUS; SOFT TISSUE at 07:08

## 2020-08-16 RX ADMIN — HYDROXYZINE PAMOATE 50 MG: 25 CAPSULE ORAL at 07:08

## 2020-08-16 NOTE — ED PROVIDER NOTES
Encounter Date: 8/16/2020    SCRIBE #1 NOTE: I, Mark Julio, am scribing for, and in the presence of,  Alonso Davidson NP. I have scribed the following portions of the note - Other sections scribed: HPI/ROS/PE.       History     Chief Complaint   Patient presents with    Rash     Pt c/o a rash with pain and itching since last Monday. Pt was seen by a physician and prescribed a cream which has not provided any relief.      Pt seen by provider at 06:52    This 50 y.o. female with a medical history of left DCIS presents to the ED for an emergent evaluation of an itchy rash to the bilateral wrists, L chest, and bilateral thighs x 7 days with associated redness. Pt reports the rash has been gradually worsening since onset. Pt reports she was evaluated by PCP previously and was prescribed Hydrocortisone ointment with some relief. Pt has also been attempting tx with Zyrtec and Benadryl. No recent sick contacts at home. Of note, pt reports she was outside cutting limbs last week. Otherwise, pt denies fever, chills, n/v, cough, chest pain, SOB, numbness, weakness, and any other associated symptoms.    The history is provided by the patient. No  was used.     Review of patient's allergies indicates:  No Known Allergies  Past Medical History:   Diagnosis Date    Allergy     Breast cyst     DCIS (ductal carcinoma in situ)     Left    Endometriosis     Papilloma of breast     Reflux      Past Surgical History:   Procedure Laterality Date    BREAST BIOPSY Left     papilloma    BREAST CYST ASPIRATION Left     BREAST LUMPECTOMY      2 lumpectomy    diagnostic lap      TONSILLECTOMY       Family History   Problem Relation Age of Onset    Diabetes Mother     Heart disease Mother     COPD Mother     Hypertension Mother     Kidney disease Mother     Cancer Mother     Throat cancer Mother     Depression Mother     Hearing loss Mother     Heart failure Mother     Pacemaker/defibrilator  Mother     Heart disease Father     Cancer Father         Pancreatic    Diabetes Father     Bone cancer Father     Pacemaker/defibrilator Father     Breast cancer Maternal Aunt     Breast cancer Paternal Aunt     Breast cancer Maternal Aunt     Heart attack Maternal Grandfather     Pacemaker/defibrilator Maternal Grandfather     Colon cancer Neg Hx     Ovarian cancer Neg Hx      Social History     Tobacco Use    Smoking status: Former Smoker     Packs/day: 0.50     Years: 20.00     Pack years: 10.00     Types: Cigarettes     Quit date: 2017     Years since quittin.9    Smokeless tobacco: Never Used   Substance Use Topics    Alcohol use: Yes     Alcohol/week: 1.0 standard drinks     Types: 1 Cans of beer per week     Comment: weekly    Drug use: Not Currently     Review of Systems   Constitutional: Negative for chills and fever.   HENT: Negative for congestion, rhinorrhea and sore throat.    Eyes: Negative for pain and visual disturbance.   Respiratory: Negative for cough and shortness of breath.    Cardiovascular: Negative for chest pain.   Gastrointestinal: Negative for abdominal pain, diarrhea, nausea and vomiting.   Genitourinary: Negative for difficulty urinating and dysuria.   Musculoskeletal: Negative for neck stiffness.   Skin: Positive for color change and rash.   Neurological: Negative for weakness, numbness and headaches.       Physical Exam     Initial Vitals [20 0606]   BP Pulse Resp Temp SpO2   121/80 102 18 98.5 °F (36.9 °C) 98 %      MAP       --         Physical Exam    Nursing note and vitals reviewed.  Constitutional: She appears well-developed and well-nourished. She is not diaphoretic.  Non-toxic appearance. No distress.   HENT:   Head: Normocephalic and atraumatic.   Mouth/Throat: Oropharynx is clear and moist.   Eyes: Conjunctivae and EOM are normal. Pupils are equal, round, and reactive to light.   Neck: Normal range of motion. Neck supple.   Cardiovascular:  Normal rate and regular rhythm.   Pulmonary/Chest: Breath sounds normal. No respiratory distress.   Abdominal: Soft. There is no abdominal tenderness.   Musculoskeletal: Normal range of motion. No tenderness or edema.   Neurological: She is alert and oriented to person, place, and time.   Skin: Skin is warm and dry. Rash noted. Rash is maculopapular.   Erythematous and pruritic maculopapular rash to the bilateral wrists, R upper thigh, and L chest without pain.  Some areas of the rash are linear.   Psychiatric: She has a normal mood and affect.         ED Course   Procedures  Labs Reviewed - No data to display       Imaging Results    None          Medical Decision Making:   History:   Old Medical Records: I decided to obtain old medical records.  Differential Diagnosis:   Contact dermatitis, allergic reaction, scabies, SJS, secondary syphilis, others  ED Management:  HPI and physical exam as above.    Patient with highly pruritic maculopapular rash to the bilateral forearms/wrists, the right upper thigh, and the left chest.  No rash to the palms or soles.  Symptoms began about 5 days ago of and have been worsening.  The rash is nonpainful.  No one else in the patient's household has a similar rash.  Patient reports that she was working outside cutting down branches 2 days before the rash began.  Possible contact dermatitis secondary to poison ivy or other similar plant.  There is also possibility of scabies given intense itching, the appearance of the rash, and the presence of several linear tract-like areas of rash.  Will treat with steroids and permethrin.  Pruritus treated with hydroxyzine. Advised patient to follow up with her PCP for re-evaluation and further management.  ED return precautions given. All questions regarding diagnosis and plan were answered to the patient's fullest possible satisfaction. Patient expressed understanding of diagnosis, discharge instructions, and return  precautions.            Patient note was created using Kash voice dictation software.  Any errors in syntax or information may not have been identified and edited prior to signing this note.              Scribe Attestation:   Scribe #1: I performed the above scribed service and the documentation accurately describes the services I performed. I attest to the accuracy of the note.                    Clinical Impression:       ICD-10-CM ICD-9-CM   1. Maculopapular rash  R21 782.1   2. Pruritic rash  L28.2 698.2     Scribe Attestation: I, Alonso Davidson NP, personally performed the services described in this documentation. All medical record entries made by the scribe were at my direction and in my presence. I have reviewed the chart and agree that the record reflects my personal performance and is accurate and complete.    Disposition:   Disposition: Discharged  Condition: Stable     ED Disposition Condition    Discharge Stable        ED Prescriptions     Medication Sig Dispense Start Date End Date Auth. Provider    permethrin (ELIMITE) 5 % cream Apply 5% cream to entire body from the neck down, then wash off after 8 to 14 hours. Repeat if symptoms have not resolved in 14 days. 60 g 8/16/2020  Alonso Davidson NP    hydrOXYzine HCL (ATARAX) 25 MG tablet Take 1 tablet (25 mg total) by mouth every 6 (six) hours as needed for Itching. 15 tablet 8/16/2020  Alonso Davidson NP        Follow-up Information     Follow up With Specialties Details Why Contact Info    Parth Wolfe MD Family Medicine Schedule an appointment as soon as possible for a visit in 1 week For further evaluation [address] [facility] [address]  [phone]      [facility] [place], [place] Emergency Medicine Go to  If symptoms worsen, As needed [address] [place] [address] [phone]                                     Alonso Davidson NP  08/16/20 3405

## 2020-08-16 NOTE — ED NOTES
Pt ambulatory to discharge, NAD. Pt verbalized understanding of prescriptions and of symptoms that would warrant return to the ED. All personal belongings with the patient at the time of discharge.

## 2020-08-16 NOTE — ED TRIAGE NOTES
Pt comes in POV with c/o itching, blistered rash -linear in nature on legs, arms, and breast. She states she was recently (6 days prior) cutting down limbs in her yard and believes maybe she has a rash from that exposure. She states they are itching and not responding to steroid cream she has been using at home.

## 2020-08-16 NOTE — DISCHARGE INSTRUCTIONS
Use medications as prescribed.  Wash all sheets, towels, etc. in hot water.     Follow-up with your regular doctor if symptoms do not improve.    Return to the emergency department for any new or worsening symptoms.    Thank you for coming to our Emergency Department today. It is important to remember that some problems are difficult to diagnose and may not be found during your first visit. Be sure to follow up with your primary care doctor.  If you do not have one, you may contact the one listed on your discharge paperwork or you may also call the [facility] Appointment Desk at [phone] to schedule an appointment with one.     Return to the ER with any questions/concerns, new/concerning symptoms, worsening or failure to improve. Do not drive or make any important decisions for 24 hours if you have received any pain medications, sedatives or mood altering drugs during your ER visit.

## 2020-09-11 ENCOUNTER — LAB VISIT (OUTPATIENT)
Dept: LAB | Facility: HOSPITAL | Age: 50
End: 2020-09-11
Attending: INTERNAL MEDICINE
Payer: MEDICAID

## 2020-09-11 DIAGNOSIS — Z79.811 ENCOUNTER FOR MONITORING AROMATASE INHIBITOR THERAPY: ICD-10-CM

## 2020-09-11 DIAGNOSIS — D05.12 DUCTAL CARCINOMA IN SITU (DCIS) OF LEFT BREAST: ICD-10-CM

## 2020-09-11 DIAGNOSIS — Z51.81 ENCOUNTER FOR MONITORING AROMATASE INHIBITOR THERAPY: ICD-10-CM

## 2020-09-11 LAB
ALBUMIN SERPL BCP-MCNC: 4.3 G/DL (ref 3.5–5.2)
ALP SERPL-CCNC: 126 U/L (ref 55–135)
ALT SERPL W/O P-5'-P-CCNC: 33 U/L (ref 10–44)
ANION GAP SERPL CALC-SCNC: 8 MMOL/L (ref 8–16)
AST SERPL-CCNC: 23 U/L (ref 10–40)
BASOPHILS # BLD AUTO: 0.03 K/UL (ref 0–0.2)
BASOPHILS NFR BLD: 0.4 % (ref 0–1.9)
BILIRUB SERPL-MCNC: 0.3 MG/DL (ref 0.1–1)
BUN SERPL-MCNC: 15 MG/DL (ref 6–20)
CALCIUM SERPL-MCNC: 9.9 MG/DL (ref 8.7–10.5)
CHLORIDE SERPL-SCNC: 107 MMOL/L (ref 95–110)
CO2 SERPL-SCNC: 26 MMOL/L (ref 23–29)
CREAT SERPL-MCNC: 1 MG/DL (ref 0.5–1.4)
DIFFERENTIAL METHOD: NORMAL
EOSINOPHIL # BLD AUTO: 0.4 K/UL (ref 0–0.5)
EOSINOPHIL NFR BLD: 5.8 % (ref 0–8)
ERYTHROCYTE [DISTWIDTH] IN BLOOD BY AUTOMATED COUNT: 13.8 % (ref 11.5–14.5)
EST. GFR  (AFRICAN AMERICAN): >60 ML/MIN/1.73 M^2
EST. GFR  (NON AFRICAN AMERICAN): >60 ML/MIN/1.73 M^2
GLUCOSE SERPL-MCNC: 104 MG/DL (ref 70–110)
HCT VFR BLD AUTO: 40.1 % (ref 37–48.5)
HGB BLD-MCNC: 13.1 G/DL (ref 12–16)
IMM GRANULOCYTES # BLD AUTO: 0.02 K/UL (ref 0–0.04)
IMM GRANULOCYTES NFR BLD AUTO: 0.3 % (ref 0–0.5)
LYMPHOCYTES # BLD AUTO: 1.9 K/UL (ref 1–4.8)
LYMPHOCYTES NFR BLD: 26.6 % (ref 18–48)
MCH RBC QN AUTO: 29.8 PG (ref 27–31)
MCHC RBC AUTO-ENTMCNC: 32.7 G/DL (ref 32–36)
MCV RBC AUTO: 91 FL (ref 82–98)
MONOCYTES # BLD AUTO: 0.4 K/UL (ref 0.3–1)
MONOCYTES NFR BLD: 6.3 % (ref 4–15)
NEUTROPHILS # BLD AUTO: 4.2 K/UL (ref 1.8–7.7)
NEUTROPHILS NFR BLD: 60.6 % (ref 38–73)
NRBC BLD-RTO: 0 /100 WBC
PLATELET # BLD AUTO: 243 K/UL (ref 150–350)
PMV BLD AUTO: 11.2 FL (ref 9.2–12.9)
POTASSIUM SERPL-SCNC: 4.3 MMOL/L (ref 3.5–5.1)
PROT SERPL-MCNC: 7.1 G/DL (ref 6–8.4)
RBC # BLD AUTO: 4.39 M/UL (ref 4–5.4)
SODIUM SERPL-SCNC: 141 MMOL/L (ref 136–145)
WBC # BLD AUTO: 6.95 K/UL (ref 3.9–12.7)

## 2020-09-11 PROCEDURE — 85025 COMPLETE CBC W/AUTO DIFF WBC: CPT

## 2020-09-11 PROCEDURE — 80053 COMPREHEN METABOLIC PANEL: CPT

## 2020-09-11 PROCEDURE — 36415 COLL VENOUS BLD VENIPUNCTURE: CPT

## 2020-09-15 ENCOUNTER — HOSPITAL ENCOUNTER (OUTPATIENT)
Dept: RADIOLOGY | Facility: CLINIC | Age: 50
Discharge: HOME | End: 2020-09-15
Attending: INTERNAL MEDICINE
Payer: MEDICAID

## 2020-09-15 DIAGNOSIS — M85.80 OSTEOPENIA, UNSPECIFIED LOCATION: ICD-10-CM

## 2020-09-15 PROCEDURE — 77080 DXA BONE DENSITY AXIAL: CPT | Mod: 26,,, | Performed by: INTERNAL MEDICINE

## 2020-09-15 PROCEDURE — 77080 DEXA BONE DENSITY SPINE HIP: ICD-10-PCS | Mod: 26,,, | Performed by: INTERNAL MEDICINE

## 2020-09-15 PROCEDURE — 77080 DXA BONE DENSITY AXIAL: CPT | Mod: TC,PO

## 2020-09-16 ENCOUNTER — OFFICE VISIT (OUTPATIENT)
Dept: HEMATOLOGY/ONCOLOGY | Facility: CLINIC | Age: 50
End: 2020-09-16
Payer: MEDICAID

## 2020-09-16 VITALS
TEMPERATURE: 98 F | BODY MASS INDEX: 28.98 KG/M2 | HEIGHT: 59 IN | WEIGHT: 143.75 LBS | HEART RATE: 75 BPM | DIASTOLIC BLOOD PRESSURE: 77 MMHG | SYSTOLIC BLOOD PRESSURE: 109 MMHG | OXYGEN SATURATION: 98 %

## 2020-09-16 DIAGNOSIS — D05.12 DUCTAL CARCINOMA IN SITU (DCIS) OF LEFT BREAST: Primary | ICD-10-CM

## 2020-09-16 DIAGNOSIS — M85.80 OSTEOPENIA, UNSPECIFIED LOCATION: ICD-10-CM

## 2020-09-16 DIAGNOSIS — Z79.811 ENCOUNTER FOR MONITORING AROMATASE INHIBITOR THERAPY: ICD-10-CM

## 2020-09-16 DIAGNOSIS — Z51.81 ENCOUNTER FOR MONITORING AROMATASE INHIBITOR THERAPY: ICD-10-CM

## 2020-09-16 PROCEDURE — 99214 PR OFFICE/OUTPT VISIT, EST, LEVL IV, 30-39 MIN: ICD-10-PCS | Mod: S$PBB,,, | Performed by: INTERNAL MEDICINE

## 2020-09-16 PROCEDURE — 99214 OFFICE O/P EST MOD 30 MIN: CPT | Mod: PBBFAC | Performed by: INTERNAL MEDICINE

## 2020-09-16 PROCEDURE — 99999 PR PBB SHADOW E&M-EST. PATIENT-LVL IV: ICD-10-PCS | Mod: PBBFAC,,, | Performed by: INTERNAL MEDICINE

## 2020-09-16 PROCEDURE — 99214 OFFICE O/P EST MOD 30 MIN: CPT | Mod: S$PBB,,, | Performed by: INTERNAL MEDICINE

## 2020-09-16 PROCEDURE — 99999 PR PBB SHADOW E&M-EST. PATIENT-LVL IV: CPT | Mod: PBBFAC,,, | Performed by: INTERNAL MEDICINE

## 2020-09-16 NOTE — PROGRESS NOTES
Subjective:               Patient ID: Lou Jc is a 50 y.o. female.    Chief Complaint: Breast Cancer (3 month follow up)       Diagnosis: Stage 0  pTis N0 M0 grade 2 ER + UT + DCIS of the lt breast s/p lt partial mastectomy 9/21/18. with positive margin s/p re-excison on 11/16/18  S/p adjuvant XRT left breast under direction of Dr. Vázquez at [facility] on 1/25/2019   Arimidex 3/2019- present            HPI: Pt is a  50  y.o. female seen today via televisit for f/u for   Left breast cancer. She was initially seen at [facility] ([facility] in [place], [place]). She had a BI-RADS 0 bilateral screening mammogram for architectural distortion and subsequent bilateral diagnostic mammogram and left breast ultrasound that were BI-RADS 4 and revealed focal asymmetry at the outer central position left breast She subsequently underwent ultrasound-guided core needle biopsy on 8/17/18 with pathology showing fragmented papilloma with no evidence of atypia or in-situ or invasive carcinoma. She was then seen by General Surgery at [facility] on 8/31/18 to discuss wire localization excisional biopsy of this intraductal papilloma, which ultimately occurred on 9/21/18. Final pathology from the excision biopsy upstaged to a 2.0 cm grade 2 ER+ (moderate) UT+ (moderate) ductal carcinoma in situ with focally positive medial margin with no evidence of invasive carcinoma. She subsequently underwent re-excison on 11/16/18. Final pathology showed LCIS but no residual DCIS. Margins uninvolved with closest margins are medial and inferior - 1mm, superior 6mm. She routinely performs self breast exams. She has not noted a change on breast exam. Patient denies nipple discharge. She reports she has not undergone annual routine screening mammograms. She reports history of previous breast bx in 2015- pathologically benign. She underwent [facility] testing with was negative .         She is followed by Rad/Onc  She has completed adjuvant XRT left  breast to a dose of 5005cGy under direction of Dr. Vázquez at [facility] on 1/25/2019     She reports she was recently diagnosed with rash on LUE secondary to poison ivy   She visited ED sec to sx's and was treated with steroid cream and rash now nearly resolved  She continues with mild hot flashes,  relieved with prescribed Clonidine  No SOB/CP  Appetite and weight stable  No arthralgias      Mammo 5/19/20   Impression:  Bilateral  There is no mammographic evidence of malignancy.   BI-RADS Category:   Overall: 2 - Benign        [facility], negative Integrated BRACAnalysis with [facility]      Risk Factors/Breast History Age of Menarche: 10y.o. Age of Menopause: 48. LMP 2016. History of Hormonal Therapy: OCPs x 4 years. No HRT.Nulliparous Family History of Breast Ca: Maternal aunt (age 60s). Maternal aunt (age 70s). Paternal aunt (age 70s).   No Family hx of Ovarian Cancer. Other Family History: Father with pancreatic cancer.         Social History .She is a chronic smoker. She has smoked 1ppd x 25 yrs. She drinks 2 beers/week.          Past Medical History:   Diagnosis Date    Allergy     Breast cyst     DCIS (ductal carcinoma in situ)     Left    Endometriosis     Papilloma of breast     Reflux          Past Surgical History:   Procedure Laterality Date    BREAST BIOPSY Left     papilloma    BREAST CYST ASPIRATION Left     BREAST LUMPECTOMY      2 lumpectomy    diagnostic lap      TONSILLECTOMY         Review of Systems   Constitutional: Negative for appetite change, fatigue, fever and unexpected weight change.   HENT: Negative for mouth sores.    Eyes: Negative for visual disturbance.   Respiratory: Negative for cough and shortness of breath.    Cardiovascular: Negative for chest pain.   Gastrointestinal: Negative for abdominal pain and diarrhea.   Endocrine:        Mild hot flashes    Genitourinary: Negative for frequency.   Musculoskeletal: Negative for back pain.   Skin: Negative for rash.  "  Neurological: Negative for headaches.   Hematological: Negative for adenopathy.   Psychiatric/Behavioral: The patient is not nervous/anxious.        Objective:        Vitals:    09/16/20 0844   BP: 109/77   BP Location: Right arm   Patient Position: Sitting   BP Method: Medium (Automatic)   Pulse: 75   Temp: 97.7 °F (36.5 °C)   TempSrc: Oral   SpO2: 98%   Weight: 65.2 kg (143 lb 11.8 oz)   Height: 4' 11" (1.499 m)     Physical Exam   Constitutional: She is oriented to person, place, and time. She appears well-developed and well-nourished.   HENT:   Head: Normocephalic.   Mouth/Throat: Oropharynx is clear and moist. No oropharyngeal exudate.   Eyes: Conjunctivae and lids are normal. Pupils are equal, round, and reactive to light. No scleral icterus.   Neck: Normal range of motion. Neck supple. No thyromegaly present.   Cardiovascular: Normal rate, regular rhythm and normal heart sounds.    No murmur heard.  Pulmonary/Chest: Breath sounds normal. She has no wheezes. She has no rales.   Abdominal: Soft. Bowel sounds are normal. She exhibits no distension and no mass. There is no hepatosplenomegaly. There is no tenderness. There is no rebound and no guarding.   Musculoskeletal: Normal range of motion. She exhibits no edema and no tenderness.   Right breast exhibits tenderness. Right breast exhibits no mass, no nipple discharge and no skin change. Left breast exhibits tenderness. Left breast exhibits no mass, no nipple discharge and no skin change.   Lymphadenopathy:     She has no cervical adenopathy.     She has no axillary adenopathy.        Right: No supraclavicular adenopathy present.        Left: No supraclavicular adenopathy present.   Neurological: She is alert and oriented to person, place, and time. No cranial nerve deficit. Coordination normal.   Skin: Skin is warm and dry. No ecchymosis, no petechiae and no rash noted. No erythema.   Psychiatric: She has a normal mood and affect.           Lab Results "   Component Value Date    WBC 6.95 09/11/2020    HGB 13.1 09/11/2020    HCT 40.1 09/11/2020    MCV 91 09/11/2020     09/11/2020     Results for STEFFANIE JIMENEZ (MRN [id number]) as of 1/10/2019 09:20   Ref. Range 12/6/2018 10:27   Estradiol Latest Ref Range: See Text pg/mL <10 (A)   Testosterone, Free Latest Units: pg/mL 1.8     FINAL PATHOLOGIC DIAGNOSIS 9/21/2018   Left breast, wire localized lumpectomy:  -Ductal carcinoma in situ (DCIS), Grade 2 (intermediate), focally involving the medial margin, present in 5  blocks with an estimated size of at least 20 mm (see cancer case summary below)  -Background breast tissue with fibrocystic change including dense fibrosis, papillomatosis, adenosis, cystic  dilatation, apocrine metaplasia, and fibroadenomatoid nodules  -Biopsy site changes  -No evidence of invasive carcinoma  Surgical pathology cancer case summary:  -Procedure: Excision (less than total mastectomy)  -Specimen laterality: Left  -Size (extent) of DCIS: Estimated size of DCIS: At least 20 mm: Number blocks with DCIS: 5  -Histologic type: Ductal carcinoma in situ  -Architectural patterns: Solid  -Nuclear grade: Grade 2 (intermediate)  -Necrosis: Not identified  -Margins: Medial margin focally positive for DCIS  -Regional lymph nodes: No lymph nodes submitted or found  -Pathologic stage classification (pTNM):  Primary tumor (pT): pTis (DCIS): Ductal carcinoma in situ  Regional lymph nodes (pN): pNX: Regional lymph nodes cannot be assessed  -Microcalcifications: Present in DCIS and nonneoplastic tissue     Hormone receptor results (performed with appropriate controls):  ER - Positive (moderate)  OR - Positive (moderate)           FINAL PATHOLOGIC DIAGNOSIS  11/16/2018    1. LEFT BREAST, RE-EXCISION:  No residual ductal carcinoma in-situ  Lobular carcinoma in-situ  Margins negative for neoplasia or malignancy (closest margins are medial and inferior - 1mm, superior 6mm)  Atypical lobular  hyperplasia  Intraductal papilloma  Negative for malignancy  Extensive previous procedure related changes present  (specimen has been submitted in it's entirety)  2. NEW ANTERIOR MARGIN, EXCISION:  Benign skin and subcutaneous tissue with extensive inflammation and procedure related changes  Negative for neoplasia or malignancy  (specimen has been submitted in its entirety)  Part1.  Immunostain E-Cadherin has been performed (with appropriate controls) on two sections #1C AND 1G and is  negative, supporting the above diagnosis.  Hormone receptors performed for LCIS (Part 1)  ER - Positive (90% of the tumor nuclei, moderate to strong)  RI - Positive (30% of the tumor nuclei, weak)  HER2 - Indeterminate (2+), specimen will be sent out for FISH analysis  Ki67 - 5%, positive tumor nuclei  [id number],ER,PGR        Bone Density  1/18/2018   Osteopenia.  Ten year probability of major osteoporotic fracture is 3.9%, and hip fracture is 0.2%.      Results for STEFFANIE JIMENEZ (MRN [id number]) as of 6/13/2019 08:27   Ref. Range 4/8/2019 10:08 6/10/2019 08:19   Vit D, 25-Hydroxy Latest Ref Range: 30 - 96 ng/mL 45 52     Mammo 5/19/20   Impression:  Bilateral  There is no mammographic evidence of malignancy.   BI-RADS Category:   Overall: 2 - Benign        Assessment:       1. Ductal carcinoma in situ (DCIS) of left breast    2. Encounter for monitoring aromatase inhibitor therapy    3. Osteopenia, unspecified location        Plan:       1-2   Patient is a 50 y.o. female with Stage 0 pTis N0 M0 grade 2 ER + RI + DCIS of the left breast status post left partial mastectomy with focally positive medial margin s/p re-excison on 11/16/18. Final pathology showed LCIS but no residual DCIS with negative with closest margins are medial and inferior - 1mm   S/p  adjuvant XRT  under the direction of Rad[facility]c at [facility] completed 1/25/2019   [employer], negative Integrated BRACAnalysis with [facility]   Cont with Arimidex    Continue monthly  SBE  She will continue with monthly self-breast exams    Mammo 5/19/20   Impression:  Bilateral  There is no mammographic evidence of malignancy.   BI-RADS Category:   Overall: 2 - Benign    MRI  breast 10/2019 BI-RADS Category: Overall: 2 - Benign  Impression:  Bilateral  There is no mammographic evidence of malignancy.     BI-RADS Category:   Overall: 2 - Benign    Cont  Clonidine 0.1mg po bid prn hot flashes             3. Bone density  1/10/2019 reveals osteopenia  Cont Ca and Vit D   Consider bisphosphonate therapy   Plan bone density every 1  To 2 years   Continue weight bearing exercises  Avoidance of smoking  Follow-up done density 9/15/2020 ( results pending)         She will follow-up in 3  mos    .    Advance Care Planning     Power of   I previously  initiated the process of advance care planning today and explained the importance of this process to the patient.  I introduced the concept of advance directives to the patient, as well. Then the patient received detailed information about the importance of designating a Health Care Power of  (HCPOA). She was also instructed to communicate with this person about their wishes for future healthcare, should she become sick and lose decision-making capacity. The patient has not previously appointed a HCPOA. After our discussion, the patient has decided to complete a HCPOA and has appointed her brother  Xiang Trinidad [phone] and NAME:  Leon Lay [phone]  I spent a total time of 16  minutes discussing this issue with the patient.    Advance Care Planning     Living Will  During previous visit, I engaged the patient in the advance care planning process.  The patient and I reviewed the role for advance directives and their purpose in directing future healthcare if the patient's unable to speak for him/herself.  At this point in time, the patient does have full decision-making capacity.  We discussed different extreme health  states that she could experience, and reviewed what kind of medical care she would want in those situations.  The patient communicated that if she were comatose and had little chance of a meaningful recovery, she would not want machines/life-sustaining treatments used.    The patient has completed a living will to reflect these preferences.  The patient  has already designated a healthcare power of  to make decisions on her behalf.   I spent a total of  16  minutes engaging the patient in this advance care planning discussion.           Cc: MD Maryjo Pagan MD

## 2020-12-14 ENCOUNTER — LAB VISIT (OUTPATIENT)
Dept: LAB | Facility: HOSPITAL | Age: 50
End: 2020-12-14
Attending: INTERNAL MEDICINE
Payer: MEDICAID

## 2020-12-14 DIAGNOSIS — Z79.811 ENCOUNTER FOR MONITORING AROMATASE INHIBITOR THERAPY: ICD-10-CM

## 2020-12-14 DIAGNOSIS — D05.12 DUCTAL CARCINOMA IN SITU (DCIS) OF LEFT BREAST: ICD-10-CM

## 2020-12-14 DIAGNOSIS — Z51.81 ENCOUNTER FOR MONITORING AROMATASE INHIBITOR THERAPY: ICD-10-CM

## 2020-12-14 LAB
ALBUMIN SERPL BCP-MCNC: 4.3 G/DL (ref 3.5–5.2)
ALP SERPL-CCNC: 124 U/L (ref 55–135)
ALT SERPL W/O P-5'-P-CCNC: 34 U/L (ref 10–44)
ANION GAP SERPL CALC-SCNC: 10 MMOL/L (ref 8–16)
AST SERPL-CCNC: 25 U/L (ref 10–40)
BASOPHILS # BLD AUTO: 0.04 K/UL (ref 0–0.2)
BASOPHILS NFR BLD: 0.5 % (ref 0–1.9)
BILIRUB SERPL-MCNC: 0.3 MG/DL (ref 0.1–1)
BUN SERPL-MCNC: 15 MG/DL (ref 6–20)
CALCIUM SERPL-MCNC: 10.3 MG/DL (ref 8.7–10.5)
CHLORIDE SERPL-SCNC: 104 MMOL/L (ref 95–110)
CO2 SERPL-SCNC: 27 MMOL/L (ref 23–29)
CREAT SERPL-MCNC: 0.9 MG/DL (ref 0.5–1.4)
DIFFERENTIAL METHOD: NORMAL
EOSINOPHIL # BLD AUTO: 0.2 K/UL (ref 0–0.5)
EOSINOPHIL NFR BLD: 2.3 % (ref 0–8)
ERYTHROCYTE [DISTWIDTH] IN BLOOD BY AUTOMATED COUNT: 13.7 % (ref 11.5–14.5)
EST. GFR  (AFRICAN AMERICAN): >60 ML/MIN/1.73 M^2
EST. GFR  (NON AFRICAN AMERICAN): >60 ML/MIN/1.73 M^2
GLUCOSE SERPL-MCNC: 103 MG/DL (ref 70–110)
HCT VFR BLD AUTO: 40.2 % (ref 37–48.5)
HGB BLD-MCNC: 13.4 G/DL (ref 12–16)
IMM GRANULOCYTES # BLD AUTO: 0.03 K/UL (ref 0–0.04)
IMM GRANULOCYTES NFR BLD AUTO: 0.4 % (ref 0–0.5)
LYMPHOCYTES # BLD AUTO: 2 K/UL (ref 1–4.8)
LYMPHOCYTES NFR BLD: 26.4 % (ref 18–48)
MCH RBC QN AUTO: 29.5 PG (ref 27–31)
MCHC RBC AUTO-ENTMCNC: 33.3 G/DL (ref 32–36)
MCV RBC AUTO: 89 FL (ref 82–98)
MONOCYTES # BLD AUTO: 0.5 K/UL (ref 0.3–1)
MONOCYTES NFR BLD: 6 % (ref 4–15)
NEUTROPHILS # BLD AUTO: 4.8 K/UL (ref 1.8–7.7)
NEUTROPHILS NFR BLD: 64.4 % (ref 38–73)
NRBC BLD-RTO: 0 /100 WBC
PLATELET # BLD AUTO: 264 K/UL (ref 150–350)
PMV BLD AUTO: 11.1 FL (ref 9.2–12.9)
POTASSIUM SERPL-SCNC: 4.4 MMOL/L (ref 3.5–5.1)
PROT SERPL-MCNC: 7.4 G/DL (ref 6–8.4)
RBC # BLD AUTO: 4.54 M/UL (ref 4–5.4)
SODIUM SERPL-SCNC: 141 MMOL/L (ref 136–145)
WBC # BLD AUTO: 7.51 K/UL (ref 3.9–12.7)

## 2020-12-14 PROCEDURE — 80053 COMPREHEN METABOLIC PANEL: CPT

## 2020-12-14 PROCEDURE — 85025 COMPLETE CBC W/AUTO DIFF WBC: CPT

## 2020-12-14 PROCEDURE — 36415 COLL VENOUS BLD VENIPUNCTURE: CPT

## 2020-12-16 ENCOUNTER — OFFICE VISIT (OUTPATIENT)
Dept: HEMATOLOGY/ONCOLOGY | Facility: CLINIC | Age: 50
End: 2020-12-16
Payer: MEDICAID

## 2020-12-16 VITALS
OXYGEN SATURATION: 97 % | TEMPERATURE: 98 F | HEIGHT: 61 IN | HEART RATE: 104 BPM | DIASTOLIC BLOOD PRESSURE: 82 MMHG | BODY MASS INDEX: 26.22 KG/M2 | SYSTOLIC BLOOD PRESSURE: 122 MMHG | WEIGHT: 138.88 LBS

## 2020-12-16 DIAGNOSIS — M81.0 OSTEOPOROSIS WITHOUT CURRENT PATHOLOGICAL FRACTURE, UNSPECIFIED OSTEOPOROSIS TYPE: ICD-10-CM

## 2020-12-16 DIAGNOSIS — D05.12 DUCTAL CARCINOMA IN SITU (DCIS) OF LEFT BREAST: Primary | ICD-10-CM

## 2020-12-16 DIAGNOSIS — Z51.81 ENCOUNTER FOR MONITORING AROMATASE INHIBITOR THERAPY: ICD-10-CM

## 2020-12-16 DIAGNOSIS — Z79.811 ENCOUNTER FOR MONITORING AROMATASE INHIBITOR THERAPY: ICD-10-CM

## 2020-12-16 PROCEDURE — 99999 PR PBB SHADOW E&M-EST. PATIENT-LVL IV: CPT | Mod: PBBFAC,,, | Performed by: INTERNAL MEDICINE

## 2020-12-16 PROCEDURE — 99214 OFFICE O/P EST MOD 30 MIN: CPT | Mod: S$PBB,,, | Performed by: INTERNAL MEDICINE

## 2020-12-16 PROCEDURE — 99999 PR PBB SHADOW E&M-EST. PATIENT-LVL IV: ICD-10-PCS | Mod: PBBFAC,,, | Performed by: INTERNAL MEDICINE

## 2020-12-16 PROCEDURE — 99214 OFFICE O/P EST MOD 30 MIN: CPT | Mod: PBBFAC | Performed by: INTERNAL MEDICINE

## 2020-12-16 PROCEDURE — 99214 PR OFFICE/OUTPT VISIT, EST, LEVL IV, 30-39 MIN: ICD-10-PCS | Mod: S$PBB,,, | Performed by: INTERNAL MEDICINE

## 2020-12-16 RX ORDER — ALENDRONATE SODIUM 70 MG/1
70 TABLET ORAL
Qty: 4 TABLET | Refills: 11 | Status: SHIPPED | OUTPATIENT
Start: 2020-12-16 | End: 2022-02-28 | Stop reason: SDUPTHER

## 2020-12-16 NOTE — PROGRESS NOTES
Subjective:               Patient ID: Lou Jc is a 50 y.o. female.    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 with positive margin s/p re-excison on 11/16/18  S/p adjuvant XRT left breast under direction of Dr. Vázquez at [facility] on 1/25/2019   Arimidex 3/2019- present            HPI: Pt is a  50  y.o. female seen today via televisit for f/u for   Left breast cancer. She was initially seen at [facility] ([facility] in [place], [place]). She had a BI-RADS 0 bilateral screening mammogram for architectural distortion and subsequent bilateral diagnostic mammogram and left breast ultrasound that were BI-RADS 4 and revealed focal asymmetry at the outer central position left breast She subsequently underwent ultrasound-guided core needle biopsy on 8/17/18 with pathology showing fragmented papilloma with no evidence of atypia or in-situ or invasive carcinoma. She was then seen by General Surgery at [facility] on 8/31/18 to discuss wire localization excisional biopsy of this intraductal papilloma, which ultimately occurred on 9/21/18. Final pathology from the excision biopsy upstaged to a 2.0 cm grade 2 ER+ (moderate) CO+ (moderate) ductal carcinoma in situ with focally positive medial margin with no evidence of invasive carcinoma. She subsequently underwent re-excison on 11/16/18. Final pathology showed LCIS but no residual DCIS. Margins uninvolved with closest margins are medial and inferior - 1mm, superior 6mm. She routinely performs self breast exams. She has not noted a change on breast exam. Patient denies nipple discharge. She reports she has not undergone annual routine screening mammograms. She reports history of previous breast bx in 2015- pathologically benign. She underwent [facility] testing with was negative .         She is followed by Rad/Onc  She has completed adjuvant XRT left  breast to a dose of 5005cGy under direction of Dr. Vázquez at [facility] on 1/25/2019     No new issues  She continues with mild hot flashes,  relieved with prescribed Clonidine  No SOB/CP/cough   Appetite and weight stable  No arthralgias      Mammo 5/19/20   Impression:  Bilateral  There is no mammographic evidence of malignancy.   BI-RADS Category:   Overall: 2 - Benign        [facility], negative [facility] with [facility]      Risk Factors/Breast History Age of Menarche: 10y.o. Age of Menopause: 48. LMP 2016. History of Hormonal Therapy: OCPs x 4 years. No HRT.Nulliparous Family History of Breast Ca: Maternal aunt (age 60s). Maternal aunt (age 70s). Paternal aunt (age 70s).   No Family hx of Ovarian Cancer. Other Family History: Father with pancreatic cancer.         Social History .She is a chronic smoker. She has smoked 1ppd x 25 yrs. She drinks 2 beers/week.          Past Medical History:   Diagnosis Date    Allergy     Breast cyst     DCIS (ductal carcinoma in situ)     Left    Endometriosis     Papilloma of breast     Reflux          Past Surgical History:   Procedure Laterality Date    BREAST BIOPSY Left     papilloma    BREAST CYST ASPIRATION Left     BREAST LUMPECTOMY      2 lumpectomy    diagnostic lap      TONSILLECTOMY         Review of Systems   Constitutional: Negative for appetite change, fatigue, fever and unexpected weight change.   HENT: Negative for mouth sores.    Eyes: Negative for visual disturbance.   Respiratory: Negative for cough and shortness of breath.    Cardiovascular: Negative for chest pain.   Gastrointestinal: Negative for abdominal pain and diarrhea.   Endocrine:        Mild hot flashes    Genitourinary: Negative for frequency.   Musculoskeletal: Negative for back pain.   Skin: Negative for rash.   Neurological: Negative for headaches.   Hematological: Negative for adenopathy.   Psychiatric/Behavioral: The patient is not nervous/anxious.        Objective:     "    Vitals:    12/16/20 0831   BP: 122/82   BP Location: Right arm   Patient Position: Sitting   BP Method: Medium (Automatic)   Pulse: 104   Temp: 98.3 °F (36.8 °C)   TempSrc: Oral   SpO2: 97%   Weight: 63 kg (138 lb 14.2 oz)   Height: 5' 1" (1.549 m)     Physical Exam   Constitutional: She is oriented to person, place, and time. She appears well-developed and well-nourished.   HENT:   Head: Normocephalic.   Mouth/Throat: Oropharynx is clear and moist. No oropharyngeal exudate.   Eyes: Conjunctivae and lids are normal. Pupils are equal, round, and reactive to light. No scleral icterus.   Neck: Normal range of motion. Neck supple. No thyromegaly present.   Cardiovascular: Normal rate, regular rhythm and normal heart sounds.    No murmur heard.  Pulmonary/Chest: Breath sounds normal. She has no wheezes. She has no rales.   Abdominal: Soft. Bowel sounds are normal. She exhibits no distension and no mass. There is no hepatosplenomegaly. There is no tenderness. There is no rebound and no guarding.   Musculoskeletal: Normal range of motion. She exhibits no edema and no tenderness.   Right breast exhibits tenderness. Right breast exhibits no mass, no nipple discharge and no skin change. Left breast exhibits tenderness. Left breast exhibits no mass, no nipple discharge and no skin change.   Lymphadenopathy:     She has no cervical adenopathy.     She has no axillary adenopathy.        Right: No supraclavicular adenopathy present.        Left: No supraclavicular adenopathy present.   Neurological: She is alert and oriented to person, place, and time. No cranial nerve deficit. Coordination normal.   Skin: Skin is warm and dry. No ecchymosis, no petechiae and no rash noted. No erythema.   Psychiatric: She has a normal mood and affect.           Lab Results   Component Value Date    WBC 7.51 12/14/2020    HGB 13.4 12/14/2020    HCT 40.2 12/14/2020    MCV 89 12/14/2020     12/14/2020     Results for STEFFANIE JIMENEZ " (MRN [id number]) as of 1/10/2019 09:20   Ref. Range 12/6/2018 10:27   Estradiol Latest Ref Range: See Text pg/mL <10 (A)   Testosterone, Free Latest Units: pg/mL 1.8     FINAL PATHOLOGIC DIAGNOSIS 9/21/2018   Left breast, wire localized lumpectomy:  -Ductal carcinoma in situ (DCIS), Grade 2 (intermediate), focally involving the medial margin, present in 5  blocks with an estimated size of at least 20 mm (see cancer case summary below)  -Background breast tissue with fibrocystic change including dense fibrosis, papillomatosis, adenosis, cystic  dilatation, apocrine metaplasia, and fibroadenomatoid nodules  -Biopsy site changes  -No evidence of invasive carcinoma  Surgical pathology cancer case summary:  -Procedure: Excision (less than total mastectomy)  -Specimen laterality: Left  -Size (extent) of DCIS: Estimated size of DCIS: At least 20 mm: Number blocks with DCIS: 5  -Histologic type: Ductal carcinoma in situ  -Architectural patterns: Solid  -Nuclear grade: Grade 2 (intermediate)  -Necrosis: Not identified  -Margins: Medial margin focally positive for DCIS  -Regional lymph nodes: No lymph nodes submitted or found  -Pathologic stage classification (pTNM):  Primary tumor (pT): pTis (DCIS): Ductal carcinoma in situ  Regional lymph nodes (pN): pNX: Regional lymph nodes cannot be assessed  -Microcalcifications: Present in DCIS and nonneoplastic tissue     Hormone receptor results (performed with appropriate controls):  ER - Positive (moderate)  SD - Positive (moderate)           FINAL PATHOLOGIC DIAGNOSIS  11/16/2018    1. LEFT BREAST, RE-EXCISION:  No residual ductal carcinoma in-situ  Lobular carcinoma in-situ  Margins negative for neoplasia or malignancy (closest margins are medial and inferior - 1mm, superior 6mm)  Atypical lobular hyperplasia  Intraductal papilloma  Negative for malignancy  Extensive previous procedure related changes present  (specimen has been submitted in it's entirety)  2. NEW ANTERIOR MARGIN,  EXCISION:  Benign skin and subcutaneous tissue with extensive inflammation and procedure related changes  Negative for neoplasia or malignancy  (specimen has been submitted in its entirety)  Part1.  Immunostain E-Cadherin has been performed (with appropriate controls) on two sections #1C AND 1G and is  negative, supporting the above diagnosis.  Hormone receptors performed for LCIS (Part 1)  ER - Positive (90% of the tumor nuclei, moderate to strong)  NC - Positive (30% of the tumor nuclei, weak)  HER2 - Indeterminate (2+), specimen will be sent out for FISH analysis  Ki67 - 5%, positive tumor nuclei  [id number],ER,PGR        Bone Density  1/18/2018   Osteopenia.  Ten year probability of major osteoporotic fracture is 3.9%, and hip fracture is 0.2%.      Results for STEFFANIE JIMENEZ (MRN [id number]) as of 6/13/2019 08:27   Ref. Range 4/8/2019 10:08 6/10/2019 08:19   Vit D, 25-Hydroxy Latest Ref Range: 30 - 96 ng/mL 45 52     Mammo 5/19/20   Impression:  Bilateral  There is no mammographic evidence of malignancy.   BI-RADS Category:   Overall: 2 - Benign        Assessment:       1. Ductal carcinoma in situ (DCIS) of left breast    2. Encounter for monitoring aromatase inhibitor therapy    3. Osteoporosis without current pathological fracture, unspecified osteoporosis type        Plan:       1-2   Patient is a 50 y.o. female with Stage 0 pTis N0 M0 grade 2 ER + NC + DCIS of the left breast status post left partial mastectomy with focally positive medial margin s/p re-excison on 11/16/18. Final pathology showed LCIS but no residual DCIS with negative with closest margins are medial and inferior - 1mm   S/p  adjuvant XRT  under the direction of Rad/Onc at [facility] completed 1/25/2019   [facility], negative Integrated [facility]ACAnalysis with [facility]   Cont with Arimidex    Continue monthly SBE  She will continue with monthly self-breast exams    Mammo 5/19/20   Impression:  Bilateral  There is no mammographic evidence of  malignancy.   BI-RADS Category:   Overall: 2 - Benign       BI-RADS Category:   Overall: 2 - Benign    Cont  Clonidine 0.1mg po bid prn hot flashes     3. Bone density  recent reveals osteoporosis  Cont Ca and Vit D   Vit D supp prescribed per PCP  Plan  bisphosphonate therapy   Rx provided for Fosamax  Plan bone density every 1  To 2 years   Continue weight bearing exercises  Avoidance of smoking      She will follow-up in 3  Mos with cbc,cmp, and Vit D level    .    Advance Care Planning     Power of   I previously  initiated the process of advance care planning today and explained the importance of this process to the patient.  I introduced the concept of advance directives to the patient, as well. Then the patient received detailed information about the importance of designating a Health Care Power of  (HCPOA). She was also instructed to communicate with this person about their wishes for future healthcare, should she become sick and lose decision-making capacity. The patient has not previously appointed a HCPOA. After our discussion, the patient has decided to complete a HCPOA and has appointed her brother  Xiang Trinidad [phone] and NAME:  Leon Lay [phone]  I spent a total time of 16  minutes discussing this issue with the patient.    Advance Care Planning     Living Will  During previous visit, I engaged the patient in the advance care planning process.  The patient and I reviewed the role for advance directives and their purpose in directing future healthcare if the patient's unable to speak for him/herself.  At this point in time, the patient does have full decision-making capacity.  We discussed different extreme health states that she could experience, and reviewed what kind of medical care she would want in those situations.  The patient communicated that if she were comatose and had little chance of a meaningful recovery, she would not want  machines/life-sustaining treatments used.    The patient has completed a living will to reflect these preferences.  The patient  has already designated a healthcare power of  to make decisions on her behalf.   I spent a total of  16  minutes engaging the patient in this advance care planning discussion.           Cc: MD Maryjo Pagan MD

## 2020-12-29 DIAGNOSIS — D05.12 DUCTAL CARCINOMA IN SITU (DCIS) OF LEFT BREAST: Primary | ICD-10-CM

## 2020-12-29 RX ORDER — ANASTROZOLE 1 MG/1
1 TABLET ORAL DAILY
Qty: 30 TABLET | Refills: 6 | Status: SHIPPED | OUTPATIENT
Start: 2020-12-29 | End: 2021-08-04

## 2020-12-29 NOTE — TELEPHONE ENCOUNTER
"----- Message from Keshia Brian sent at 12/28/2020  3:08 PM CST -----  Refill     RX Name and Strength:  anastrozole (ARIMIDEX) 1 mg Tab    How is the patient currently taking it?   Sig: Take 1 tablet by mouth once daily    Is this a 30 day or 90 day Rx? 30 day     Preferred Pharmacy with phone number:  [id number]  [place], [place] - [address] [place] [address] [phone] (Phone)  [phone] (Fax)      Local or Mail Order: Local   Ordering Provider:Katelyn Najera MD  Contact Preference: [phone]    Additional Information:  "Thank you for all that you do for our patients"         "

## 2021-03-15 ENCOUNTER — LAB VISIT (OUTPATIENT)
Dept: LAB | Facility: HOSPITAL | Age: 51
End: 2021-03-15
Attending: INTERNAL MEDICINE
Payer: MEDICAID

## 2021-03-15 DIAGNOSIS — M81.0 OSTEOPOROSIS WITHOUT CURRENT PATHOLOGICAL FRACTURE, UNSPECIFIED OSTEOPOROSIS TYPE: ICD-10-CM

## 2021-03-15 DIAGNOSIS — D05.12 DUCTAL CARCINOMA IN SITU (DCIS) OF LEFT BREAST: ICD-10-CM

## 2021-03-15 LAB
25(OH)D3+25(OH)D2 SERPL-MCNC: 57 NG/ML (ref 30–96)
ALBUMIN SERPL BCP-MCNC: 4.1 G/DL (ref 3.5–5.2)
ALP SERPL-CCNC: 121 U/L (ref 55–135)
ALT SERPL W/O P-5'-P-CCNC: 29 U/L (ref 10–44)
ANION GAP SERPL CALC-SCNC: 8 MMOL/L (ref 8–16)
AST SERPL-CCNC: 23 U/L (ref 10–40)
BASOPHILS # BLD AUTO: 0.03 K/UL (ref 0–0.2)
BASOPHILS NFR BLD: 0.4 % (ref 0–1.9)
BILIRUB SERPL-MCNC: 0.3 MG/DL (ref 0.1–1)
BUN SERPL-MCNC: 15 MG/DL (ref 6–20)
CALCIUM SERPL-MCNC: 8.7 MG/DL (ref 8.7–10.5)
CHLORIDE SERPL-SCNC: 107 MMOL/L (ref 95–110)
CO2 SERPL-SCNC: 27 MMOL/L (ref 23–29)
CREAT SERPL-MCNC: 0.8 MG/DL (ref 0.5–1.4)
DIFFERENTIAL METHOD: NORMAL
EOSINOPHIL # BLD AUTO: 0.2 K/UL (ref 0–0.5)
EOSINOPHIL NFR BLD: 2.3 % (ref 0–8)
ERYTHROCYTE [DISTWIDTH] IN BLOOD BY AUTOMATED COUNT: 14.2 % (ref 11.5–14.5)
EST. GFR  (AFRICAN AMERICAN): >60 ML/MIN/1.73 M^2
EST. GFR  (NON AFRICAN AMERICAN): >60 ML/MIN/1.73 M^2
GLUCOSE SERPL-MCNC: 131 MG/DL (ref 70–110)
HCT VFR BLD AUTO: 39.3 % (ref 37–48.5)
HGB BLD-MCNC: 12.6 G/DL (ref 12–16)
IMM GRANULOCYTES # BLD AUTO: 0.02 K/UL (ref 0–0.04)
IMM GRANULOCYTES NFR BLD AUTO: 0.3 % (ref 0–0.5)
LYMPHOCYTES # BLD AUTO: 1.7 K/UL (ref 1–4.8)
LYMPHOCYTES NFR BLD: 23.1 % (ref 18–48)
MCH RBC QN AUTO: 29.6 PG (ref 27–31)
MCHC RBC AUTO-ENTMCNC: 32.1 G/DL (ref 32–36)
MCV RBC AUTO: 93 FL (ref 82–98)
MONOCYTES # BLD AUTO: 0.3 K/UL (ref 0.3–1)
MONOCYTES NFR BLD: 4.1 % (ref 4–15)
NEUTROPHILS # BLD AUTO: 5.1 K/UL (ref 1.8–7.7)
NEUTROPHILS NFR BLD: 69.8 % (ref 38–73)
NRBC BLD-RTO: 0 /100 WBC
PLATELET # BLD AUTO: 277 K/UL (ref 150–350)
PMV BLD AUTO: 11.2 FL (ref 9.2–12.9)
POTASSIUM SERPL-SCNC: 4.2 MMOL/L (ref 3.5–5.1)
PROT SERPL-MCNC: 7.2 G/DL (ref 6–8.4)
RBC # BLD AUTO: 4.25 M/UL (ref 4–5.4)
SODIUM SERPL-SCNC: 142 MMOL/L (ref 136–145)
WBC # BLD AUTO: 7.28 K/UL (ref 3.9–12.7)

## 2021-03-15 PROCEDURE — 36415 COLL VENOUS BLD VENIPUNCTURE: CPT | Performed by: INTERNAL MEDICINE

## 2021-03-15 PROCEDURE — 80053 COMPREHEN METABOLIC PANEL: CPT | Performed by: INTERNAL MEDICINE

## 2021-03-15 PROCEDURE — 85025 COMPLETE CBC W/AUTO DIFF WBC: CPT | Performed by: INTERNAL MEDICINE

## 2021-03-15 PROCEDURE — 82306 VITAMIN D 25 HYDROXY: CPT | Performed by: INTERNAL MEDICINE

## 2021-03-16 ENCOUNTER — OFFICE VISIT (OUTPATIENT)
Dept: HEMATOLOGY/ONCOLOGY | Facility: CLINIC | Age: 51
End: 2021-03-16
Payer: MEDICAID

## 2021-03-16 VITALS
SYSTOLIC BLOOD PRESSURE: 101 MMHG | TEMPERATURE: 98 F | BODY MASS INDEX: 26.73 KG/M2 | OXYGEN SATURATION: 99 % | HEIGHT: 61 IN | WEIGHT: 141.56 LBS | HEART RATE: 75 BPM | DIASTOLIC BLOOD PRESSURE: 64 MMHG

## 2021-03-16 DIAGNOSIS — M81.0 OSTEOPOROSIS WITHOUT CURRENT PATHOLOGICAL FRACTURE, UNSPECIFIED OSTEOPOROSIS TYPE: ICD-10-CM

## 2021-03-16 DIAGNOSIS — D05.12 DUCTAL CARCINOMA IN SITU (DCIS) OF LEFT BREAST: Primary | ICD-10-CM

## 2021-03-16 DIAGNOSIS — Z79.811 LONG TERM (CURRENT) USE OF AROMATASE INHIBITORS: ICD-10-CM

## 2021-03-16 PROCEDURE — 99214 PR OFFICE/OUTPT VISIT, EST, LEVL IV, 30-39 MIN: ICD-10-PCS | Mod: S$PBB,,, | Performed by: INTERNAL MEDICINE

## 2021-03-16 PROCEDURE — 99215 OFFICE O/P EST HI 40 MIN: CPT | Mod: PBBFAC | Performed by: INTERNAL MEDICINE

## 2021-03-16 PROCEDURE — 99999 PR PBB SHADOW E&M-EST. PATIENT-LVL V: ICD-10-PCS | Mod: PBBFAC,,, | Performed by: INTERNAL MEDICINE

## 2021-03-16 PROCEDURE — 99214 OFFICE O/P EST MOD 30 MIN: CPT | Mod: S$PBB,,, | Performed by: INTERNAL MEDICINE

## 2021-03-16 PROCEDURE — 99999 PR PBB SHADOW E&M-EST. PATIENT-LVL V: CPT | Mod: PBBFAC,,, | Performed by: INTERNAL MEDICINE

## 2021-03-29 ENCOUNTER — IMMUNIZATION (OUTPATIENT)
Dept: PHARMACY | Facility: CLINIC | Age: 51
End: 2021-03-29
Payer: MEDICAID

## 2021-03-29 DIAGNOSIS — Z23 NEED FOR VACCINATION: Primary | ICD-10-CM

## 2021-04-05 ENCOUNTER — HOSPITAL ENCOUNTER (EMERGENCY)
Facility: HOSPITAL | Age: 51
Discharge: HOME | End: 2021-04-05
Attending: EMERGENCY MEDICINE
Payer: MEDICAID

## 2021-04-05 VITALS
DIASTOLIC BLOOD PRESSURE: 70 MMHG | OXYGEN SATURATION: 96 % | SYSTOLIC BLOOD PRESSURE: 137 MMHG | HEIGHT: 61 IN | RESPIRATION RATE: 16 BRPM | BODY MASS INDEX: 26.06 KG/M2 | WEIGHT: 138 LBS | HEART RATE: 73 BPM | TEMPERATURE: 99 F

## 2021-04-05 DIAGNOSIS — R55 SYNCOPE, UNSPECIFIED SYNCOPE TYPE: Primary | ICD-10-CM

## 2021-04-05 DIAGNOSIS — S09.93XA FACIAL INJURY, INITIAL ENCOUNTER: ICD-10-CM

## 2021-04-05 DIAGNOSIS — R93.89 ABNORMAL COMPUTED TOMOGRAPHY ANGIOGRAPHY (CTA): ICD-10-CM

## 2021-04-05 LAB
ALBUMIN SERPL BCP-MCNC: 5 G/DL (ref 3.5–5.2)
ALP SERPL-CCNC: 128 U/L (ref 55–135)
ALT SERPL W/O P-5'-P-CCNC: 39 U/L (ref 10–44)
AMPHET+METHAMPHET UR QL: NEGATIVE
ANION GAP SERPL CALC-SCNC: 14 MMOL/L (ref 8–16)
AST SERPL-CCNC: 30 U/L (ref 10–40)
B-HCG UR QL: NEGATIVE
BARBITURATES UR QL SCN>200 NG/ML: NEGATIVE
BASOPHILS # BLD AUTO: 0.03 K/UL (ref 0–0.2)
BASOPHILS NFR BLD: 0.3 % (ref 0–1.9)
BENZODIAZ UR QL SCN>200 NG/ML: NEGATIVE
BILIRUB SERPL-MCNC: 0.4 MG/DL (ref 0.1–1)
BILIRUB UR QL STRIP: NEGATIVE
BUN SERPL-MCNC: 14 MG/DL (ref 6–20)
BZE UR QL SCN: NEGATIVE
CALCIUM SERPL-MCNC: 10.8 MG/DL (ref 8.7–10.5)
CANNABINOIDS UR QL SCN: NORMAL
CHLORIDE SERPL-SCNC: 104 MMOL/L (ref 95–110)
CHOLEST SERPL-MCNC: 202 MG/DL (ref 120–199)
CHOLEST/HDLC SERPL: 3.8 {RATIO} (ref 2–5)
CLARITY UR: CLEAR
CO2 SERPL-SCNC: 23 MMOL/L (ref 23–29)
COLOR UR: YELLOW
CREAT SERPL-MCNC: 0.9 MG/DL (ref 0.5–1.4)
CREAT UR-MCNC: 159.3 MG/DL (ref 15–325)
CTP QC/QA: YES
D DIMER PPP IA.FEU-MCNC: 0.58 MG/L FEU
DIFFERENTIAL METHOD: NORMAL
EOSINOPHIL # BLD AUTO: 0.2 K/UL (ref 0–0.5)
EOSINOPHIL NFR BLD: 1.9 % (ref 0–8)
ERYTHROCYTE [DISTWIDTH] IN BLOOD BY AUTOMATED COUNT: 13.6 % (ref 11.5–14.5)
EST. GFR  (AFRICAN AMERICAN): >60 ML/MIN/1.73 M^2
EST. GFR  (NON AFRICAN AMERICAN): >60 ML/MIN/1.73 M^2
ETHANOL SERPL-MCNC: <10 MG/DL
GLUCOSE SERPL-MCNC: 116 MG/DL (ref 70–110)
GLUCOSE UR QL STRIP: NEGATIVE
HCT VFR BLD AUTO: 43.4 % (ref 37–48.5)
HDLC SERPL-MCNC: 53 MG/DL (ref 40–75)
HDLC SERPL: 26.2 % (ref 20–50)
HGB BLD-MCNC: 14.4 G/DL (ref 12–16)
HGB UR QL STRIP: NEGATIVE
IMM GRANULOCYTES # BLD AUTO: 0.04 K/UL (ref 0–0.04)
IMM GRANULOCYTES NFR BLD AUTO: 0.4 % (ref 0–0.5)
INR PPP: 1 (ref 0.8–1.2)
KETONES UR QL STRIP: ABNORMAL
LDLC SERPL CALC-MCNC: 126.4 MG/DL (ref 63–159)
LEUKOCYTE ESTERASE UR QL STRIP: NEGATIVE
LYMPHOCYTES # BLD AUTO: 2.3 K/UL (ref 1–4.8)
LYMPHOCYTES NFR BLD: 23.5 % (ref 18–48)
MAGNESIUM SERPL-MCNC: 1.8 MG/DL (ref 1.6–2.6)
MCH RBC QN AUTO: 29.8 PG (ref 27–31)
MCHC RBC AUTO-ENTMCNC: 33.2 G/DL (ref 32–36)
MCV RBC AUTO: 90 FL (ref 82–98)
METHADONE UR QL SCN>300 NG/ML: NEGATIVE
MONOCYTES # BLD AUTO: 0.5 K/UL (ref 0.3–1)
MONOCYTES NFR BLD: 4.8 % (ref 4–15)
NEUTROPHILS # BLD AUTO: 6.6 K/UL (ref 1.8–7.7)
NEUTROPHILS NFR BLD: 69.1 % (ref 38–73)
NITRITE UR QL STRIP: NEGATIVE
NONHDLC SERPL-MCNC: 149 MG/DL
NRBC BLD-RTO: 0 /100 WBC
OPIATES UR QL SCN: NEGATIVE
PCP UR QL SCN>25 NG/ML: NEGATIVE
PH UR STRIP: 6 [PH] (ref 5–8)
PLATELET # BLD AUTO: 274 K/UL (ref 150–450)
PMV BLD AUTO: 11.7 FL (ref 9.2–12.9)
POCT GLUCOSE: 110 MG/DL (ref 70–110)
POTASSIUM SERPL-SCNC: 4.3 MMOL/L (ref 3.5–5.1)
PROT SERPL-MCNC: 8.3 G/DL (ref 6–8.4)
PROT UR QL STRIP: ABNORMAL
PROTHROMBIN TIME: 10.2 SEC (ref 9–12.5)
RBC # BLD AUTO: 4.84 M/UL (ref 4–5.4)
SODIUM SERPL-SCNC: 141 MMOL/L (ref 136–145)
SP GR UR STRIP: 1.02 (ref 1–1.03)
TOXICOLOGY INFORMATION: NORMAL
TRIGL SERPL-MCNC: 113 MG/DL (ref 30–150)
TROPONIN I SERPL DL<=0.01 NG/ML-MCNC: <0.006 NG/ML (ref 0–0.03)
TSH SERPL DL<=0.005 MIU/L-ACNC: 1.47 UIU/ML (ref 0.4–4)
TSH SERPL DL<=0.005 MIU/L-ACNC: 2.44 UIU/ML (ref 0.4–4)
URN SPEC COLLECT METH UR: ABNORMAL
UROBILINOGEN UR STRIP-ACNC: NEGATIVE EU/DL
WBC # BLD AUTO: 9.59 K/UL (ref 3.9–12.7)

## 2021-04-05 PROCEDURE — 80053 COMPREHEN METABOLIC PANEL: CPT | Performed by: PHYSICIAN ASSISTANT

## 2021-04-05 PROCEDURE — 96361 HYDRATE IV INFUSION ADD-ON: CPT

## 2021-04-05 PROCEDURE — 85379 FIBRIN DEGRADATION QUANT: CPT | Performed by: PHYSICIAN ASSISTANT

## 2021-04-05 PROCEDURE — 96375 TX/PRO/DX INJ NEW DRUG ADDON: CPT

## 2021-04-05 PROCEDURE — 93010 ELECTROCARDIOGRAM REPORT: CPT | Mod: ,,, | Performed by: INTERNAL MEDICINE

## 2021-04-05 PROCEDURE — 93010 EKG 12-LEAD: ICD-10-PCS | Mod: ,,, | Performed by: INTERNAL MEDICINE

## 2021-04-05 PROCEDURE — 85025 COMPLETE CBC W/AUTO DIFF WBC: CPT | Performed by: PHYSICIAN ASSISTANT

## 2021-04-05 PROCEDURE — 84443 ASSAY THYROID STIM HORMONE: CPT | Mod: 91 | Performed by: PHYSICIAN ASSISTANT

## 2021-04-05 PROCEDURE — 80307 DRUG TEST PRSMV CHEM ANLYZR: CPT | Performed by: PHYSICIAN ASSISTANT

## 2021-04-05 PROCEDURE — 25500020 PHARM REV CODE 255: Performed by: EMERGENCY MEDICINE

## 2021-04-05 PROCEDURE — 81003 URINALYSIS AUTO W/O SCOPE: CPT | Mod: 59 | Performed by: PHYSICIAN ASSISTANT

## 2021-04-05 PROCEDURE — 63600175 PHARM REV CODE 636 W HCPCS: Performed by: PHYSICIAN ASSISTANT

## 2021-04-05 PROCEDURE — 82962 GLUCOSE BLOOD TEST: CPT

## 2021-04-05 PROCEDURE — 84484 ASSAY OF TROPONIN QUANT: CPT | Performed by: PHYSICIAN ASSISTANT

## 2021-04-05 PROCEDURE — 80061 LIPID PANEL: CPT | Performed by: PHYSICIAN ASSISTANT

## 2021-04-05 PROCEDURE — 99285 EMERGENCY DEPT VISIT HI MDM: CPT | Mod: 25

## 2021-04-05 PROCEDURE — 96374 THER/PROPH/DIAG INJ IV PUSH: CPT | Mod: 59

## 2021-04-05 PROCEDURE — 81025 URINE PREGNANCY TEST: CPT | Performed by: PHYSICIAN ASSISTANT

## 2021-04-05 PROCEDURE — 85610 PROTHROMBIN TIME: CPT | Performed by: PHYSICIAN ASSISTANT

## 2021-04-05 PROCEDURE — 82077 ASSAY SPEC XCP UR&BREATH IA: CPT | Performed by: PHYSICIAN ASSISTANT

## 2021-04-05 PROCEDURE — 93005 ELECTROCARDIOGRAM TRACING: CPT

## 2021-04-05 PROCEDURE — 83735 ASSAY OF MAGNESIUM: CPT | Performed by: PHYSICIAN ASSISTANT

## 2021-04-05 RX ORDER — ONDANSETRON 2 MG/ML
4 INJECTION INTRAMUSCULAR; INTRAVENOUS
Status: COMPLETED | OUTPATIENT
Start: 2021-04-05 | End: 2021-04-05

## 2021-04-05 RX ORDER — LORAZEPAM 2 MG/ML
0.5 INJECTION INTRAMUSCULAR
Status: DISCONTINUED | OUTPATIENT
Start: 2021-04-05 | End: 2021-04-05

## 2021-04-05 RX ORDER — MORPHINE SULFATE 4 MG/ML
2 INJECTION, SOLUTION INTRAMUSCULAR; INTRAVENOUS
Status: COMPLETED | OUTPATIENT
Start: 2021-04-05 | End: 2021-04-05

## 2021-04-05 RX ORDER — LORAZEPAM 2 MG/ML
0.5 INJECTION INTRAMUSCULAR
Status: COMPLETED | OUTPATIENT
Start: 2021-04-05 | End: 2021-04-05

## 2021-04-05 RX ADMIN — ONDANSETRON 4 MG: 2 INJECTION INTRAMUSCULAR; INTRAVENOUS at 10:04

## 2021-04-05 RX ADMIN — LORAZEPAM 0.5 MG: 2 INJECTION INTRAMUSCULAR; INTRAVENOUS at 01:04

## 2021-04-05 RX ADMIN — MORPHINE SULFATE 2 MG: 4 INJECTION, SOLUTION INTRAMUSCULAR; INTRAVENOUS at 11:04

## 2021-04-05 RX ADMIN — IOHEXOL 75 ML: 350 INJECTION, SOLUTION INTRAVENOUS at 02:04

## 2021-04-05 RX ADMIN — SODIUM CHLORIDE, SODIUM LACTATE, POTASSIUM CHLORIDE, AND CALCIUM CHLORIDE 1000 ML: .6; .31; .03; .02 INJECTION, SOLUTION INTRAVENOUS at 12:04

## 2021-04-06 ENCOUNTER — PES CALL (OUTPATIENT)
Dept: ADMINISTRATIVE | Facility: CLINIC | Age: 51
End: 2021-04-06

## 2021-04-26 ENCOUNTER — IMMUNIZATION (OUTPATIENT)
Dept: PHARMACY | Facility: CLINIC | Age: 51
End: 2021-04-26
Payer: MEDICAID

## 2021-04-26 DIAGNOSIS — Z23 NEED FOR VACCINATION: Primary | ICD-10-CM

## 2021-04-29 ENCOUNTER — OFFICE VISIT (OUTPATIENT)
Dept: PODIATRY | Facility: CLINIC | Age: 51
End: 2021-04-29
Payer: MEDICAID

## 2021-04-29 VITALS — BODY MASS INDEX: 26.65 KG/M2 | WEIGHT: 141.13 LBS | HEIGHT: 61 IN

## 2021-04-29 DIAGNOSIS — B35.1 ONYCHOMYCOSIS: ICD-10-CM

## 2021-04-29 DIAGNOSIS — M79.675 GREAT TOE PAIN, LEFT: ICD-10-CM

## 2021-04-29 PROCEDURE — 99999 PR PBB SHADOW E&M-EST. PATIENT-LVL IV: CPT | Mod: PBBFAC,,, | Performed by: PODIATRIST

## 2021-04-29 PROCEDURE — 99203 PR OFFICE/OUTPT VISIT, NEW, LEVL III, 30-44 MIN: ICD-10-PCS | Mod: S$PBB,,, | Performed by: PODIATRIST

## 2021-04-29 PROCEDURE — 99214 OFFICE O/P EST MOD 30 MIN: CPT | Mod: PBBFAC,PO | Performed by: PODIATRIST

## 2021-04-29 PROCEDURE — 99203 OFFICE O/P NEW LOW 30 MIN: CPT | Mod: S$PBB,,, | Performed by: PODIATRIST

## 2021-04-29 PROCEDURE — 99999 PR PBB SHADOW E&M-EST. PATIENT-LVL IV: ICD-10-PCS | Mod: PBBFAC,,, | Performed by: PODIATRIST

## 2021-04-29 RX ORDER — CICLOPIROX 80 MG/ML
SOLUTION TOPICAL NIGHTLY
Qty: 1 BOTTLE | Refills: 3 | Status: SHIPPED | OUTPATIENT
Start: 2021-04-29

## 2021-06-14 ENCOUNTER — LAB VISIT (OUTPATIENT)
Dept: LAB | Facility: HOSPITAL | Age: 51
End: 2021-06-14
Attending: INTERNAL MEDICINE
Payer: MEDICAID

## 2021-06-14 DIAGNOSIS — D05.12 DUCTAL CARCINOMA IN SITU (DCIS) OF LEFT BREAST: ICD-10-CM

## 2021-06-14 DIAGNOSIS — M81.0 OSTEOPOROSIS WITHOUT CURRENT PATHOLOGICAL FRACTURE, UNSPECIFIED OSTEOPOROSIS TYPE: ICD-10-CM

## 2021-06-14 LAB
25(OH)D3+25(OH)D2 SERPL-MCNC: 47 NG/ML (ref 30–96)
ALBUMIN SERPL BCP-MCNC: 4.3 G/DL (ref 3.5–5.2)
ALP SERPL-CCNC: 111 U/L (ref 55–135)
ALT SERPL W/O P-5'-P-CCNC: 33 U/L (ref 10–44)
ANION GAP SERPL CALC-SCNC: 7 MMOL/L (ref 8–16)
AST SERPL-CCNC: 23 U/L (ref 10–40)
BASOPHILS # BLD AUTO: 0.03 K/UL (ref 0–0.2)
BASOPHILS NFR BLD: 0.4 % (ref 0–1.9)
BILIRUB SERPL-MCNC: 0.4 MG/DL (ref 0.1–1)
BUN SERPL-MCNC: 14 MG/DL (ref 6–20)
CALCIUM SERPL-MCNC: 9.8 MG/DL (ref 8.7–10.5)
CHLORIDE SERPL-SCNC: 107 MMOL/L (ref 95–110)
CO2 SERPL-SCNC: 27 MMOL/L (ref 23–29)
CREAT SERPL-MCNC: 0.9 MG/DL (ref 0.5–1.4)
DIFFERENTIAL METHOD: NORMAL
EOSINOPHIL # BLD AUTO: 0.3 K/UL (ref 0–0.5)
EOSINOPHIL NFR BLD: 3.6 % (ref 0–8)
ERYTHROCYTE [DISTWIDTH] IN BLOOD BY AUTOMATED COUNT: 13.9 % (ref 11.5–14.5)
EST. GFR  (AFRICAN AMERICAN): >60 ML/MIN/1.73 M^2
EST. GFR  (NON AFRICAN AMERICAN): >60 ML/MIN/1.73 M^2
GLUCOSE SERPL-MCNC: 100 MG/DL (ref 70–110)
HCT VFR BLD AUTO: 42.2 % (ref 37–48.5)
HGB BLD-MCNC: 14.2 G/DL (ref 12–16)
IMM GRANULOCYTES # BLD AUTO: 0.02 K/UL (ref 0–0.04)
IMM GRANULOCYTES NFR BLD AUTO: 0.3 % (ref 0–0.5)
LYMPHOCYTES # BLD AUTO: 2.2 K/UL (ref 1–4.8)
LYMPHOCYTES NFR BLD: 28.4 % (ref 18–48)
MCH RBC QN AUTO: 30.2 PG (ref 27–31)
MCHC RBC AUTO-ENTMCNC: 33.6 G/DL (ref 32–36)
MCV RBC AUTO: 90 FL (ref 82–98)
MONOCYTES # BLD AUTO: 0.4 K/UL (ref 0.3–1)
MONOCYTES NFR BLD: 5.8 % (ref 4–15)
NEUTROPHILS # BLD AUTO: 4.7 K/UL (ref 1.8–7.7)
NEUTROPHILS NFR BLD: 61.5 % (ref 38–73)
NRBC BLD-RTO: 0 /100 WBC
PLATELET # BLD AUTO: 255 K/UL (ref 150–450)
PMV BLD AUTO: 11 FL (ref 9.2–12.9)
POTASSIUM SERPL-SCNC: 3.9 MMOL/L (ref 3.5–5.1)
PROT SERPL-MCNC: 7.5 G/DL (ref 6–8.4)
RBC # BLD AUTO: 4.7 M/UL (ref 4–5.4)
SODIUM SERPL-SCNC: 141 MMOL/L (ref 136–145)
WBC # BLD AUTO: 7.57 K/UL (ref 3.9–12.7)

## 2021-06-14 PROCEDURE — 85025 COMPLETE CBC W/AUTO DIFF WBC: CPT | Performed by: INTERNAL MEDICINE

## 2021-06-14 PROCEDURE — 80053 COMPREHEN METABOLIC PANEL: CPT | Performed by: INTERNAL MEDICINE

## 2021-06-14 PROCEDURE — 82306 VITAMIN D 25 HYDROXY: CPT | Performed by: INTERNAL MEDICINE

## 2021-06-14 PROCEDURE — 36415 COLL VENOUS BLD VENIPUNCTURE: CPT | Performed by: INTERNAL MEDICINE

## 2021-06-15 ENCOUNTER — HOSPITAL ENCOUNTER (OUTPATIENT)
Dept: RADIOLOGY | Facility: HOSPITAL | Age: 51
Discharge: HOME | End: 2021-06-15
Attending: INTERNAL MEDICINE
Payer: MEDICAID

## 2021-06-15 DIAGNOSIS — D05.12 DUCTAL CARCINOMA IN SITU (DCIS) OF LEFT BREAST: ICD-10-CM

## 2021-06-15 PROCEDURE — 77067 MAMMO DIGITAL SCREENING BILAT WITH TOMO: ICD-10-PCS | Mod: 26,,, | Performed by: RADIOLOGY

## 2021-06-15 PROCEDURE — 77067 SCR MAMMO BI INCL CAD: CPT | Mod: 26,,, | Performed by: RADIOLOGY

## 2021-06-15 PROCEDURE — 77063 BREAST TOMOSYNTHESIS BI: CPT | Mod: 26,,, | Performed by: RADIOLOGY

## 2021-06-15 PROCEDURE — 77063 MAMMO DIGITAL SCREENING BILAT WITH TOMO: ICD-10-PCS | Mod: 26,,, | Performed by: RADIOLOGY

## 2021-06-15 PROCEDURE — 77067 SCR MAMMO BI INCL CAD: CPT | Mod: TC

## 2021-06-16 ENCOUNTER — OFFICE VISIT (OUTPATIENT)
Dept: HEMATOLOGY/ONCOLOGY | Facility: CLINIC | Age: 51
End: 2021-06-16
Payer: MEDICAID

## 2021-06-16 VITALS
SYSTOLIC BLOOD PRESSURE: 136 MMHG | HEIGHT: 59 IN | WEIGHT: 136.25 LBS | TEMPERATURE: 99 F | BODY MASS INDEX: 27.47 KG/M2 | OXYGEN SATURATION: 99 % | HEART RATE: 83 BPM | DIASTOLIC BLOOD PRESSURE: 78 MMHG

## 2021-06-16 DIAGNOSIS — Z79.811 LONG TERM (CURRENT) USE OF AROMATASE INHIBITORS: ICD-10-CM

## 2021-06-16 DIAGNOSIS — D05.12 DUCTAL CARCINOMA IN SITU (DCIS) OF LEFT BREAST: Primary | ICD-10-CM

## 2021-06-16 DIAGNOSIS — M81.0 OSTEOPOROSIS WITHOUT CURRENT PATHOLOGICAL FRACTURE, UNSPECIFIED OSTEOPOROSIS TYPE: ICD-10-CM

## 2021-06-16 DIAGNOSIS — R92.8 ABNORMALITY OF RIGHT BREAST ON SCREENING MAMMOGRAM: ICD-10-CM

## 2021-06-16 PROCEDURE — 99999 PR PBB SHADOW E&M-EST. PATIENT-LVL V: CPT | Mod: PBBFAC,,, | Performed by: INTERNAL MEDICINE

## 2021-06-16 PROCEDURE — 99214 PR OFFICE/OUTPT VISIT, EST, LEVL IV, 30-39 MIN: ICD-10-PCS | Mod: S$PBB,,, | Performed by: INTERNAL MEDICINE

## 2021-06-16 PROCEDURE — 99214 OFFICE O/P EST MOD 30 MIN: CPT | Mod: S$PBB,,, | Performed by: INTERNAL MEDICINE

## 2021-06-16 PROCEDURE — 99999 PR PBB SHADOW E&M-EST. PATIENT-LVL V: ICD-10-PCS | Mod: PBBFAC,,, | Performed by: INTERNAL MEDICINE

## 2021-06-16 PROCEDURE — 99215 OFFICE O/P EST HI 40 MIN: CPT | Mod: PBBFAC | Performed by: INTERNAL MEDICINE

## 2021-06-28 ENCOUNTER — HOSPITAL ENCOUNTER (OUTPATIENT)
Dept: RADIOLOGY | Facility: HOSPITAL | Age: 51
Discharge: HOME | End: 2021-06-28
Attending: INTERNAL MEDICINE
Payer: MEDICAID

## 2021-06-28 DIAGNOSIS — R92.8 ABNORMALITY OF RIGHT BREAST ON SCREENING MAMMOGRAM: ICD-10-CM

## 2021-06-28 PROCEDURE — 76642 ULTRASOUND BREAST LIMITED: CPT | Mod: TC,RT

## 2021-06-28 PROCEDURE — 76642 ULTRASOUND BREAST LIMITED: CPT | Mod: 26,RT,, | Performed by: RADIOLOGY

## 2021-06-28 PROCEDURE — 77061 BREAST TOMOSYNTHESIS UNI: CPT | Mod: 26,RT,, | Performed by: RADIOLOGY

## 2021-06-28 PROCEDURE — 77065 DX MAMMO INCL CAD UNI: CPT | Mod: 26,RT,, | Performed by: RADIOLOGY

## 2021-06-28 PROCEDURE — 77061 BREAST TOMOSYNTHESIS UNI: CPT | Mod: TC,RT

## 2021-06-28 PROCEDURE — 77061 MAMMO DIGITAL DIAGNOSTIC RIGHT WITH TOMO: ICD-10-PCS | Mod: 26,RT,, | Performed by: RADIOLOGY

## 2021-06-28 PROCEDURE — 77065 MAMMO DIGITAL DIAGNOSTIC RIGHT WITH TOMO: ICD-10-PCS | Mod: 26,RT,, | Performed by: RADIOLOGY

## 2021-06-28 PROCEDURE — 76642 US BREAST RIGHT LIMITED: ICD-10-PCS | Mod: 26,RT,, | Performed by: RADIOLOGY

## 2021-10-15 ENCOUNTER — LAB VISIT (OUTPATIENT)
Dept: LAB | Facility: HOSPITAL | Age: 51
End: 2021-10-15
Attending: INTERNAL MEDICINE
Payer: MEDICAID

## 2021-10-15 DIAGNOSIS — M81.0 OSTEOPOROSIS WITHOUT CURRENT PATHOLOGICAL FRACTURE, UNSPECIFIED OSTEOPOROSIS TYPE: ICD-10-CM

## 2021-10-15 LAB
25(OH)D3+25(OH)D2 SERPL-MCNC: 62 NG/ML (ref 30–96)
ALBUMIN SERPL BCP-MCNC: 3.9 G/DL (ref 3.5–5.2)
ALP SERPL-CCNC: 103 U/L (ref 55–135)
ALT SERPL W/O P-5'-P-CCNC: 32 U/L (ref 10–44)
ANION GAP SERPL CALC-SCNC: 6 MMOL/L (ref 8–16)
AST SERPL-CCNC: 25 U/L (ref 10–40)
BASOPHILS # BLD AUTO: 0.04 K/UL (ref 0–0.2)
BASOPHILS NFR BLD: 0.6 % (ref 0–1.9)
BILIRUB SERPL-MCNC: 0.2 MG/DL (ref 0.1–1)
BUN SERPL-MCNC: 12 MG/DL (ref 6–20)
CALCIUM SERPL-MCNC: 10.1 MG/DL (ref 8.7–10.5)
CHLORIDE SERPL-SCNC: 107 MMOL/L (ref 95–110)
CO2 SERPL-SCNC: 28 MMOL/L (ref 23–29)
CREAT SERPL-MCNC: 0.8 MG/DL (ref 0.5–1.4)
DIFFERENTIAL METHOD: NORMAL
EOSINOPHIL # BLD AUTO: 0.2 K/UL (ref 0–0.5)
EOSINOPHIL NFR BLD: 2.9 % (ref 0–8)
ERYTHROCYTE [DISTWIDTH] IN BLOOD BY AUTOMATED COUNT: 13.2 % (ref 11.5–14.5)
EST. GFR  (AFRICAN AMERICAN): >60 ML/MIN/1.73 M^2
EST. GFR  (NON AFRICAN AMERICAN): >60 ML/MIN/1.73 M^2
GLUCOSE SERPL-MCNC: 100 MG/DL (ref 70–110)
HCT VFR BLD AUTO: 39.1 % (ref 37–48.5)
HGB BLD-MCNC: 12.9 G/DL (ref 12–16)
IMM GRANULOCYTES # BLD AUTO: 0.01 K/UL (ref 0–0.04)
IMM GRANULOCYTES NFR BLD AUTO: 0.1 % (ref 0–0.5)
LYMPHOCYTES # BLD AUTO: 1.9 K/UL (ref 1–4.8)
LYMPHOCYTES NFR BLD: 27.4 % (ref 18–48)
MCH RBC QN AUTO: 30.1 PG (ref 27–31)
MCHC RBC AUTO-ENTMCNC: 33 G/DL (ref 32–36)
MCV RBC AUTO: 91 FL (ref 82–98)
MONOCYTES # BLD AUTO: 0.4 K/UL (ref 0.3–1)
MONOCYTES NFR BLD: 5.9 % (ref 4–15)
NEUTROPHILS # BLD AUTO: 4.3 K/UL (ref 1.8–7.7)
NEUTROPHILS NFR BLD: 63.1 % (ref 38–73)
NRBC BLD-RTO: 0 /100 WBC
PLATELET # BLD AUTO: 253 K/UL (ref 150–450)
PMV BLD AUTO: 10.8 FL (ref 9.2–12.9)
POTASSIUM SERPL-SCNC: 3.8 MMOL/L (ref 3.5–5.1)
PROT SERPL-MCNC: 7 G/DL (ref 6–8.4)
RBC # BLD AUTO: 4.29 M/UL (ref 4–5.4)
SODIUM SERPL-SCNC: 141 MMOL/L (ref 136–145)
WBC # BLD AUTO: 6.82 K/UL (ref 3.9–12.7)

## 2021-10-15 PROCEDURE — 80053 COMPREHEN METABOLIC PANEL: CPT | Performed by: INTERNAL MEDICINE

## 2021-10-15 PROCEDURE — 82306 VITAMIN D 25 HYDROXY: CPT | Performed by: INTERNAL MEDICINE

## 2021-10-15 PROCEDURE — 36415 COLL VENOUS BLD VENIPUNCTURE: CPT | Performed by: INTERNAL MEDICINE

## 2021-10-15 PROCEDURE — 85025 COMPLETE CBC W/AUTO DIFF WBC: CPT | Performed by: INTERNAL MEDICINE

## 2021-10-18 ENCOUNTER — OFFICE VISIT (OUTPATIENT)
Dept: HEMATOLOGY/ONCOLOGY | Facility: CLINIC | Age: 51
End: 2021-10-18
Payer: MEDICAID

## 2021-10-18 VITALS
SYSTOLIC BLOOD PRESSURE: 109 MMHG | DIASTOLIC BLOOD PRESSURE: 55 MMHG | HEART RATE: 88 BPM | BODY MASS INDEX: 26.75 KG/M2 | WEIGHT: 132.69 LBS | OXYGEN SATURATION: 98 % | HEIGHT: 59 IN | TEMPERATURE: 98 F

## 2021-10-18 DIAGNOSIS — D05.12 DUCTAL CARCINOMA IN SITU (DCIS) OF LEFT BREAST: ICD-10-CM

## 2021-10-18 DIAGNOSIS — M81.0 OSTEOPOROSIS WITHOUT CURRENT PATHOLOGICAL FRACTURE, UNSPECIFIED OSTEOPOROSIS TYPE: ICD-10-CM

## 2021-10-18 DIAGNOSIS — Z79.811 LONG TERM (CURRENT) USE OF AROMATASE INHIBITORS: ICD-10-CM

## 2021-10-18 PROCEDURE — 99999 PR PBB SHADOW E&M-EST. PATIENT-LVL IV: ICD-10-PCS | Mod: PBBFAC,,, | Performed by: INTERNAL MEDICINE

## 2021-10-18 PROCEDURE — 99214 OFFICE O/P EST MOD 30 MIN: CPT | Mod: S$PBB,,, | Performed by: INTERNAL MEDICINE

## 2021-10-18 PROCEDURE — 99214 PR OFFICE/OUTPT VISIT, EST, LEVL IV, 30-39 MIN: ICD-10-PCS | Mod: S$PBB,,, | Performed by: INTERNAL MEDICINE

## 2021-10-18 PROCEDURE — 99214 OFFICE O/P EST MOD 30 MIN: CPT | Mod: PBBFAC | Performed by: INTERNAL MEDICINE

## 2021-10-18 PROCEDURE — 99999 PR PBB SHADOW E&M-EST. PATIENT-LVL IV: CPT | Mod: PBBFAC,,, | Performed by: INTERNAL MEDICINE

## 2022-01-24 NOTE — LETTER
January 29, 2019      Prabha Matias MD  [address]  [facility] [address]           [facility]  [address] [facility] [address] [facility] [address]  [place] [address]  Phone: [phone]          Patient: Lou Jc   MR Number: [id number]   YOB: 1970   Date of Visit: 12/6/2018       Dear Dr. Prabha Matias:    Thank you for referring Lou Jc to me for evaluation. Attached you will find relevant portions of my assessment and plan of care.    If you have questions, please do not hesitate to call me. I look forward to following Lou Jc along with you.    Sincerely,    Esequiel Trent  CC:  No Recipients    If you would like to receive this communication electronically, please contact [email or web address] or [phone] to request more information on [facility] access.    For providers and/or their staff who would like to refer a patient to [facility], please contact us through our one-stop-shop provider referral line, Christine Ortiz, at [phone].    If you feel you have received this communication in error or would no longer like to receive these types of communications, please e-mail [email or web address]          0

## 2022-02-25 ENCOUNTER — LAB VISIT (OUTPATIENT)
Dept: LAB | Facility: HOSPITAL | Age: 52
End: 2022-02-25
Attending: INTERNAL MEDICINE
Payer: MEDICAID

## 2022-02-25 ENCOUNTER — TELEPHONE (OUTPATIENT)
Dept: HEMATOLOGY/ONCOLOGY | Facility: CLINIC | Age: 52
End: 2022-02-25
Payer: MEDICAID

## 2022-02-25 DIAGNOSIS — D05.12 DUCTAL CARCINOMA IN SITU (DCIS) OF LEFT BREAST: ICD-10-CM

## 2022-02-25 LAB
ALBUMIN SERPL BCP-MCNC: 4 G/DL (ref 3.5–5.2)
ALP SERPL-CCNC: 100 U/L (ref 55–135)
ALT SERPL W/O P-5'-P-CCNC: 30 U/L (ref 10–44)
ANION GAP SERPL CALC-SCNC: 8 MMOL/L (ref 8–16)
AST SERPL-CCNC: 21 U/L (ref 10–40)
BASOPHILS # BLD AUTO: 0.03 K/UL (ref 0–0.2)
BASOPHILS NFR BLD: 0.5 % (ref 0–1.9)
BILIRUB SERPL-MCNC: 0.2 MG/DL (ref 0.1–1)
BUN SERPL-MCNC: 15 MG/DL (ref 6–20)
CALCIUM SERPL-MCNC: 9.6 MG/DL (ref 8.7–10.5)
CHLORIDE SERPL-SCNC: 107 MMOL/L (ref 95–110)
CO2 SERPL-SCNC: 27 MMOL/L (ref 23–29)
CREAT SERPL-MCNC: 0.9 MG/DL (ref 0.5–1.4)
DIFFERENTIAL METHOD: ABNORMAL
EOSINOPHIL # BLD AUTO: 0.2 K/UL (ref 0–0.5)
EOSINOPHIL NFR BLD: 2.9 % (ref 0–8)
ERYTHROCYTE [DISTWIDTH] IN BLOOD BY AUTOMATED COUNT: 13.8 % (ref 11.5–14.5)
EST. GFR  (AFRICAN AMERICAN): >60 ML/MIN/1.73 M^2
EST. GFR  (NON AFRICAN AMERICAN): >60 ML/MIN/1.73 M^2
GLUCOSE SERPL-MCNC: 105 MG/DL (ref 70–110)
HCT VFR BLD AUTO: 35.6 % (ref 37–48.5)
HGB BLD-MCNC: 11.5 G/DL (ref 12–16)
IMM GRANULOCYTES # BLD AUTO: 0.01 K/UL (ref 0–0.04)
IMM GRANULOCYTES NFR BLD AUTO: 0.2 % (ref 0–0.5)
LYMPHOCYTES # BLD AUTO: 2.1 K/UL (ref 1–4.8)
LYMPHOCYTES NFR BLD: 32.9 % (ref 18–48)
MCH RBC QN AUTO: 30.9 PG (ref 27–31)
MCHC RBC AUTO-ENTMCNC: 32.3 G/DL (ref 32–36)
MCV RBC AUTO: 96 FL (ref 82–98)
MONOCYTES # BLD AUTO: 0.4 K/UL (ref 0.3–1)
MONOCYTES NFR BLD: 6.5 % (ref 4–15)
NEUTROPHILS # BLD AUTO: 3.7 K/UL (ref 1.8–7.7)
NEUTROPHILS NFR BLD: 57 % (ref 38–73)
NRBC BLD-RTO: 0 /100 WBC
PLATELET # BLD AUTO: 273 K/UL (ref 150–450)
PMV BLD AUTO: 10.7 FL (ref 9.2–12.9)
POTASSIUM SERPL-SCNC: 4 MMOL/L (ref 3.5–5.1)
PROT SERPL-MCNC: 6.7 G/DL (ref 6–8.4)
RBC # BLD AUTO: 3.72 M/UL (ref 4–5.4)
SODIUM SERPL-SCNC: 142 MMOL/L (ref 136–145)
WBC # BLD AUTO: 6.47 K/UL (ref 3.9–12.7)

## 2022-02-25 PROCEDURE — 85025 COMPLETE CBC W/AUTO DIFF WBC: CPT | Performed by: INTERNAL MEDICINE

## 2022-02-25 PROCEDURE — 36415 COLL VENOUS BLD VENIPUNCTURE: CPT | Performed by: INTERNAL MEDICINE

## 2022-02-25 PROCEDURE — 80053 COMPREHEN METABOLIC PANEL: CPT | Performed by: INTERNAL MEDICINE

## 2022-02-28 ENCOUNTER — OFFICE VISIT (OUTPATIENT)
Dept: HEMATOLOGY/ONCOLOGY | Facility: CLINIC | Age: 52
End: 2022-02-28
Payer: MEDICAID

## 2022-02-28 VITALS
BODY MASS INDEX: 26.18 KG/M2 | OXYGEN SATURATION: 95 % | HEIGHT: 59 IN | WEIGHT: 129.88 LBS | SYSTOLIC BLOOD PRESSURE: 115 MMHG | HEART RATE: 96 BPM | DIASTOLIC BLOOD PRESSURE: 65 MMHG | TEMPERATURE: 98 F

## 2022-02-28 DIAGNOSIS — T45.1X5A HOT FLASHES RELATED TO AROMATASE INHIBITOR THERAPY: ICD-10-CM

## 2022-02-28 DIAGNOSIS — R23.2 HOT FLASHES RELATED TO AROMATASE INHIBITOR THERAPY: ICD-10-CM

## 2022-02-28 DIAGNOSIS — D05.12 DUCTAL CARCINOMA IN SITU (DCIS) OF LEFT BREAST: Primary | ICD-10-CM

## 2022-02-28 DIAGNOSIS — Z00.00 HEALTH CARE MAINTENANCE: ICD-10-CM

## 2022-02-28 DIAGNOSIS — M81.0 OSTEOPOROSIS WITHOUT CURRENT PATHOLOGICAL FRACTURE, UNSPECIFIED OSTEOPOROSIS TYPE: ICD-10-CM

## 2022-02-28 PROCEDURE — 3074F SYST BP LT 130 MM HG: CPT | Mod: CPTII,,, | Performed by: INTERNAL MEDICINE

## 2022-02-28 PROCEDURE — 3078F PR MOST RECENT DIASTOLIC BLOOD PRESSURE < 80 MM HG: ICD-10-PCS | Mod: CPTII,,, | Performed by: INTERNAL MEDICINE

## 2022-02-28 PROCEDURE — 99215 OFFICE O/P EST HI 40 MIN: CPT | Mod: PBBFAC | Performed by: INTERNAL MEDICINE

## 2022-02-28 PROCEDURE — 3074F PR MOST RECENT SYSTOLIC BLOOD PRESSURE < 130 MM HG: ICD-10-PCS | Mod: CPTII,,, | Performed by: INTERNAL MEDICINE

## 2022-02-28 PROCEDURE — 99999 PR PBB SHADOW E&M-EST. PATIENT-LVL V: CPT | Mod: PBBFAC,,, | Performed by: INTERNAL MEDICINE

## 2022-02-28 PROCEDURE — 99999 PR PBB SHADOW E&M-EST. PATIENT-LVL V: ICD-10-PCS | Mod: PBBFAC,,, | Performed by: INTERNAL MEDICINE

## 2022-02-28 PROCEDURE — 99214 PR OFFICE/OUTPT VISIT, EST, LEVL IV, 30-39 MIN: ICD-10-PCS | Mod: S$PBB,,, | Performed by: INTERNAL MEDICINE

## 2022-02-28 PROCEDURE — 99214 OFFICE O/P EST MOD 30 MIN: CPT | Mod: S$PBB,,, | Performed by: INTERNAL MEDICINE

## 2022-02-28 PROCEDURE — 3078F DIAST BP <80 MM HG: CPT | Mod: CPTII,,, | Performed by: INTERNAL MEDICINE

## 2022-02-28 PROCEDURE — 1159F MED LIST DOCD IN RCRD: CPT | Mod: CPTII,,, | Performed by: INTERNAL MEDICINE

## 2022-02-28 PROCEDURE — 3008F PR BODY MASS INDEX (BMI) DOCUMENTED: ICD-10-PCS | Mod: CPTII,,, | Performed by: INTERNAL MEDICINE

## 2022-02-28 PROCEDURE — 3008F BODY MASS INDEX DOCD: CPT | Mod: CPTII,,, | Performed by: INTERNAL MEDICINE

## 2022-02-28 PROCEDURE — 1159F PR MEDICATION LIST DOCUMENTED IN MEDICAL RECORD: ICD-10-PCS | Mod: CPTII,,, | Performed by: INTERNAL MEDICINE

## 2022-02-28 RX ORDER — ANASTROZOLE 1 MG/1
1 TABLET ORAL DAILY
Qty: 30 TABLET | Refills: 3 | Status: SHIPPED | OUTPATIENT
Start: 2022-02-28 | End: 2022-06-16

## 2022-02-28 RX ORDER — ALENDRONATE SODIUM 70 MG/1
70 TABLET ORAL
Qty: 4 TABLET | Refills: 11 | Status: SHIPPED | OUTPATIENT
Start: 2022-02-28 | End: 2023-03-28

## 2022-02-28 NOTE — Clinical Note
Ambulatory Referral to GI ,screening colonoscopy  She will follow-up in 4  Mos with cbc,cmp prior to f/u

## 2022-07-06 DIAGNOSIS — Z12.31 SCREENING MAMMOGRAM, ENCOUNTER FOR: Primary | ICD-10-CM

## 2022-07-07 ENCOUNTER — TELEPHONE (OUTPATIENT)
Dept: SURGERY | Facility: CLINIC | Age: 52
End: 2022-07-07
Payer: MEDICAID

## 2022-07-07 NOTE — TELEPHONE ENCOUNTER
----- Message from Jes Crawford RN sent at 7/6/2022  4:26 PM CDT -----  Contact: @[phone]  Hey this is a pt of dyllan.  The last time she saw her was in 2019.  I put in the order for the mammogram but  she needs to come in and see someone.  It can be one of the APPs.  Thanks  ----- Message -----  From: Grabiel Hector  Sent: 7/6/2022  10:12 AM CDT  To: Polly Armando Staff    Patient rec'vd the Mammogram reminded via mail, pls upload the order

## 2022-09-08 ENCOUNTER — LAB VISIT (OUTPATIENT)
Dept: LAB | Facility: HOSPITAL | Age: 52
End: 2022-09-08
Attending: INTERNAL MEDICINE
Payer: MEDICAID

## 2022-09-08 DIAGNOSIS — D05.12 DUCTAL CARCINOMA IN SITU (DCIS) OF LEFT BREAST: ICD-10-CM

## 2022-09-08 LAB
ALBUMIN SERPL BCP-MCNC: 3.7 G/DL (ref 3.5–5.2)
ALP SERPL-CCNC: 116 U/L (ref 55–135)
ALT SERPL W/O P-5'-P-CCNC: 21 U/L (ref 10–44)
ANION GAP SERPL CALC-SCNC: 6 MMOL/L (ref 8–16)
AST SERPL-CCNC: 17 U/L (ref 10–40)
BASOPHILS # BLD AUTO: 0.02 K/UL (ref 0–0.2)
BASOPHILS NFR BLD: 0.2 % (ref 0–1.9)
BILIRUB SERPL-MCNC: 0.1 MG/DL (ref 0.1–1)
BUN SERPL-MCNC: 11 MG/DL (ref 6–20)
CALCIUM SERPL-MCNC: 9.4 MG/DL (ref 8.7–10.5)
CHLORIDE SERPL-SCNC: 107 MMOL/L (ref 95–110)
CO2 SERPL-SCNC: 27 MMOL/L (ref 23–29)
CREAT SERPL-MCNC: 0.8 MG/DL (ref 0.5–1.4)
DIFFERENTIAL METHOD: ABNORMAL
EOSINOPHIL # BLD AUTO: 0.2 K/UL (ref 0–0.5)
EOSINOPHIL NFR BLD: 2.2 % (ref 0–8)
ERYTHROCYTE [DISTWIDTH] IN BLOOD BY AUTOMATED COUNT: 13.1 % (ref 11.5–14.5)
EST. GFR  (NO RACE VARIABLE): >60 ML/MIN/1.73 M^2
GLUCOSE SERPL-MCNC: 107 MG/DL (ref 70–110)
HCT VFR BLD AUTO: 36.4 % (ref 37–48.5)
HGB BLD-MCNC: 11.9 G/DL (ref 12–16)
IMM GRANULOCYTES # BLD AUTO: 0.04 K/UL (ref 0–0.04)
IMM GRANULOCYTES NFR BLD AUTO: 0.4 % (ref 0–0.5)
LYMPHOCYTES # BLD AUTO: 2.1 K/UL (ref 1–4.8)
LYMPHOCYTES NFR BLD: 23.7 % (ref 18–48)
MCH RBC QN AUTO: 30.1 PG (ref 27–31)
MCHC RBC AUTO-ENTMCNC: 32.7 G/DL (ref 32–36)
MCV RBC AUTO: 92 FL (ref 82–98)
MONOCYTES # BLD AUTO: 0.5 K/UL (ref 0.3–1)
MONOCYTES NFR BLD: 5.1 % (ref 4–15)
NEUTROPHILS # BLD AUTO: 6.2 K/UL (ref 1.8–7.7)
NEUTROPHILS NFR BLD: 68.4 % (ref 38–73)
NRBC BLD-RTO: 0 /100 WBC
PLATELET # BLD AUTO: 266 K/UL (ref 150–450)
PMV BLD AUTO: 10.9 FL (ref 9.2–12.9)
POTASSIUM SERPL-SCNC: 4 MMOL/L (ref 3.5–5.1)
PROT SERPL-MCNC: 6.7 G/DL (ref 6–8.4)
RBC # BLD AUTO: 3.96 M/UL (ref 4–5.4)
SODIUM SERPL-SCNC: 140 MMOL/L (ref 136–145)
WBC # BLD AUTO: 9.02 K/UL (ref 3.9–12.7)

## 2022-09-08 PROCEDURE — 85025 COMPLETE CBC W/AUTO DIFF WBC: CPT | Performed by: INTERNAL MEDICINE

## 2022-09-08 PROCEDURE — 36415 COLL VENOUS BLD VENIPUNCTURE: CPT | Performed by: INTERNAL MEDICINE

## 2022-09-08 PROCEDURE — 80053 COMPREHEN METABOLIC PANEL: CPT | Performed by: INTERNAL MEDICINE

## 2022-09-12 ENCOUNTER — OFFICE VISIT (OUTPATIENT)
Dept: HEMATOLOGY/ONCOLOGY | Facility: CLINIC | Age: 52
End: 2022-09-12
Payer: MEDICAID

## 2022-09-12 VITALS
OXYGEN SATURATION: 97 % | TEMPERATURE: 98 F | DIASTOLIC BLOOD PRESSURE: 80 MMHG | HEIGHT: 59 IN | HEART RATE: 83 BPM | SYSTOLIC BLOOD PRESSURE: 122 MMHG | BODY MASS INDEX: 25.91 KG/M2 | WEIGHT: 128.5 LBS

## 2022-09-12 DIAGNOSIS — T45.1X5A HOT FLASHES RELATED TO AROMATASE INHIBITOR THERAPY: ICD-10-CM

## 2022-09-12 DIAGNOSIS — D05.12 DUCTAL CARCINOMA IN SITU (DCIS) OF LEFT BREAST: Primary | ICD-10-CM

## 2022-09-12 DIAGNOSIS — M81.0 OSTEOPOROSIS WITHOUT CURRENT PATHOLOGICAL FRACTURE, UNSPECIFIED OSTEOPOROSIS TYPE: ICD-10-CM

## 2022-09-12 DIAGNOSIS — R23.2 HOT FLASHES RELATED TO AROMATASE INHIBITOR THERAPY: ICD-10-CM

## 2022-09-12 DIAGNOSIS — Z79.811 LONG TERM (CURRENT) USE OF AROMATASE INHIBITORS: ICD-10-CM

## 2022-09-12 PROCEDURE — 1159F MED LIST DOCD IN RCRD: CPT | Mod: CPTII,,, | Performed by: INTERNAL MEDICINE

## 2022-09-12 PROCEDURE — 99214 PR OFFICE/OUTPT VISIT, EST, LEVL IV, 30-39 MIN: ICD-10-PCS | Mod: S$PBB,,, | Performed by: INTERNAL MEDICINE

## 2022-09-12 PROCEDURE — 3079F PR MOST RECENT DIASTOLIC BLOOD PRESSURE 80-89 MM HG: ICD-10-PCS | Mod: CPTII,,, | Performed by: INTERNAL MEDICINE

## 2022-09-12 PROCEDURE — 99999 PR PBB SHADOW E&M-EST. PATIENT-LVL V: CPT | Mod: PBBFAC,,, | Performed by: INTERNAL MEDICINE

## 2022-09-12 PROCEDURE — 99999 PR PBB SHADOW E&M-EST. PATIENT-LVL V: ICD-10-PCS | Mod: PBBFAC,,, | Performed by: INTERNAL MEDICINE

## 2022-09-12 PROCEDURE — 99215 OFFICE O/P EST HI 40 MIN: CPT | Mod: PBBFAC | Performed by: INTERNAL MEDICINE

## 2022-09-12 PROCEDURE — 1159F PR MEDICATION LIST DOCUMENTED IN MEDICAL RECORD: ICD-10-PCS | Mod: CPTII,,, | Performed by: INTERNAL MEDICINE

## 2022-09-12 PROCEDURE — 3044F HG A1C LEVEL LT 7.0%: CPT | Mod: CPTII,,, | Performed by: INTERNAL MEDICINE

## 2022-09-12 PROCEDURE — 3079F DIAST BP 80-89 MM HG: CPT | Mod: CPTII,,, | Performed by: INTERNAL MEDICINE

## 2022-09-12 PROCEDURE — 3008F BODY MASS INDEX DOCD: CPT | Mod: CPTII,,, | Performed by: INTERNAL MEDICINE

## 2022-09-12 PROCEDURE — 3044F PR MOST RECENT HEMOGLOBIN A1C LEVEL <7.0%: ICD-10-PCS | Mod: CPTII,,, | Performed by: INTERNAL MEDICINE

## 2022-09-12 PROCEDURE — 3074F SYST BP LT 130 MM HG: CPT | Mod: CPTII,,, | Performed by: INTERNAL MEDICINE

## 2022-09-12 PROCEDURE — 99214 OFFICE O/P EST MOD 30 MIN: CPT | Mod: S$PBB,,, | Performed by: INTERNAL MEDICINE

## 2022-09-12 PROCEDURE — 3008F PR BODY MASS INDEX (BMI) DOCUMENTED: ICD-10-PCS | Mod: CPTII,,, | Performed by: INTERNAL MEDICINE

## 2022-09-12 PROCEDURE — 3074F PR MOST RECENT SYSTOLIC BLOOD PRESSURE < 130 MM HG: ICD-10-PCS | Mod: CPTII,,, | Performed by: INTERNAL MEDICINE

## 2022-09-12 NOTE — PROGRESS NOTES
Subjective:           Patient ID: Lou Jc is a 52 y.o. female.    Chief Complaint: No chief complaint on file.       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 with positive margin s/p re-excison on 11/16/18    S/p adjuvant XRT left breast under direction of Dr. Vázquez at [facility] on 1/25/2019     Arimidex 3/2019- present            Prior Hx: Pt is a  52  y.o. female seen today for follow-up  Left breast cancer. She was initially seen at [facility] ([facility] in [place], [place]). She had a BI-RADS 0 bilateral screening mammogram for architectural distortion and subsequent bilateral diagnostic mammogram and left breast ultrasound that were BI-RADS 4 and revealed focal asymmetry at the outer central position left breast She subsequently underwent ultrasound-guided core needle biopsy on 8/17/18 with pathology showing fragmented papilloma with no evidence of atypia or in-situ or invasive carcinoma. She was then seen by General Surgery at [facility] on 8/31/18 to discuss wire localization excisional biopsy of this intraductal papilloma, which ultimately occurred on 9/21/18. Final pathology from the excision biopsy upstaged to a 2.0 cm grade 2 ER+ (moderate) NV+ (moderate) ductal carcinoma in situ with focally positive medial margin with no evidence of invasive carcinoma. She subsequently underwent re-excison on 11/16/18. Final pathology showed LCIS but no residual DCIS. Margins uninvolved with closest margins are medial and inferior - 1mm, superior 6mm. She routinely performs self breast exams. She has not noted a change on breast exam. Patient denies nipple discharge. She reports she has not undergone annual routine screening mammograms. She reports history of previous breast bx in 2015- pathologically benign. She underwent [facility] testing with was negative . She is followed by Rad/OncShe has completed adjuvant XRT left breast to a dose of  5005cGy under direction of Dr. Vázquez at [facility] on 1/25/2019 . She started on Arimidex 3/2019.     Interval Hx; She is tearful   She has concerns of ASE associated with use of Round Up   She developed some sx's related to pesticide exposure  Pt reports episodes of SOB  She reports she using ROUND UP 3 weeks ago and developed sore throat and   And started with throat pain a few days ago  And was spraying round up Thursday and Friday.    Pt f/b PCP and prescribed Claritin and Zpack with some improvement in sx's  She is concerned regarding long term SE       Appetite and weight stable  No arthralgias  Mild hot flashes      mammo diagnostic rt and US 6/28/21   BI-RADS Category: Overall: 1 - Negative      [facility], negative Integrated BRACAnalysis with [facility]      Risk Factors/Breast History Age of Menarche: 10y.o. Age of Menopause: 48. LMP 2016. History of Hormonal Therapy: OCPs x 4 years. No HRT.Nulliparous Family History of Breast Ca: Maternal aunt (age 60s). Maternal aunt (age 70s). Paternal aunt (age 70s).   No Family hx of Ovarian Cancer. Other Family History: Father with pancreatic cancer.         Social History .She is a chronic smoker. She has smoked 1ppd x 25 yrs. She drinks 2 beers/week.          Past Medical History:   Diagnosis Date    Allergy     Breast cancer 2018    Breast cyst     DCIS (ductal carcinoma in situ)     Left    Endometriosis     Papilloma of breast     Reflux          Past Surgical History:   Procedure Laterality Date    BREAST BIOPSY Left     DCIS    BREAST CYST ASPIRATION Left     BREAST LUMPECTOMY Left 2018    2 lumpectomy    diagnostic lap      TONSILLECTOMY         Review of Systems   Constitutional:  Negative for appetite change, fatigue, fever and unexpected weight change.   HENT:  Negative for mouth sores.    Eyes:  Negative for visual disturbance.   Respiratory:  Positive for cough (improved). Negative for shortness of breath.    Cardiovascular:  Negative for chest pain.  "  Gastrointestinal:  Negative for abdominal pain and diarrhea.   Endocrine:        Mild hot flashes    Genitourinary:  Negative for frequency.   Musculoskeletal:  Negative for back pain.   Skin:  Negative for rash.   Neurological:  Negative for headaches.   Hematological:  Negative for adenopathy.   Psychiatric/Behavioral:  The patient is nervous/anxious.      Objective:          Vitals:    09/12/22 1435   BP: 122/80   Pulse: 83   Temp: 98.1 °F (36.7 °C)   SpO2: 97%   Weight: 58.3 kg (128 lb 8.5 oz)   Height: 4' 11" (1.499 m)       Physical Exam   Constitutional: She is oriented to person, place, and time. She appears well-developed and well-nourished.   HENT:   Head: Normocephalic.   Mouth/Throat: Oropharynx is clear and moist. No oropharyngeal exudate.   Eyes: Conjunctivae and lids are normal. Pupils are equal, round, and reactive to light. No scleral icterus.   Neck: Normal range of motion. Neck supple. No thyromegaly present.   Cardiovascular: Normal rate, regular rhythm and normal heart sounds.    No murmur heard.  Pulmonary/Chest: Breath sounds normal. She has no wheezes. She has no rales.   Abdominal: Soft. Bowel sounds are normal. She exhibits no distension and no mass. There is no hepatosplenomegaly. There is no tenderness. There is no rebound and no guarding.   Musculoskeletal: Normal range of motion. She exhibits no edema and no tenderness.   Right breast exhibits tenderness. Right breast exhibits no mass, no nipple discharge and no skin change. Left breast exhibits tenderness. Left breast exhibits no mass, no nipple discharge and no skin change.   Lymphadenopathy:     She has no cervical adenopathy.     She has no axillary adenopathy.        Right: No supraclavicular adenopathy present.        Left: No supraclavicular adenopathy present.   Neurological: She is alert and oriented to person, place, and time. No cranial nerve deficit. Coordination normal.   Skin: Skin is warm and dry. No ecchymosis, no " petechiae and no rash noted. No erythema.   Psychiatric: She has a normal mood and affect.           Lab Results   Component Value Date    WBC 9.02 09/08/2022    HGB 11.9 (L) 09/08/2022    HCT 36.4 (L) 09/08/2022    MCV 92 09/08/2022     09/08/2022     Results for STEFFANIE JIMENEZ (MRN [id number]) as of 1/10/2019 09:20   Ref. Range 12/6/2018 10:27   Estradiol Latest Ref Range: See Text pg/mL <10 (A)   Testosterone, Free Latest Units: pg/mL 1.8     FINAL PATHOLOGIC DIAGNOSIS 9/21/2018   Left breast, wire localized lumpectomy:  -Ductal carcinoma in situ (DCIS), Grade 2 (intermediate), focally involving the medial margin, present in 5  blocks with an estimated size of at least 20 mm (see cancer case summary below)  -Background breast tissue with fibrocystic change including dense fibrosis, papillomatosis, adenosis, cystic  dilatation, apocrine metaplasia, and fibroadenomatoid nodules  -Biopsy site changes  -No evidence of invasive carcinoma  Surgical pathology cancer case summary:  -Procedure: Excision (less than total mastectomy)  -Specimen laterality: Left  -Size (extent) of DCIS: Estimated size of DCIS: At least 20 mm: Number blocks with DCIS: 5  -Histologic type: Ductal carcinoma in situ  -Architectural patterns: Solid  -Nuclear grade: Grade 2 (intermediate)  -Necrosis: Not identified  -Margins: Medial margin focally positive for DCIS  -Regional lymph nodes: No lymph nodes submitted or found  -Pathologic stage classification (pTNM):  Primary tumor (pT): pTis (DCIS): Ductal carcinoma in situ  Regional lymph nodes (pN): pNX: Regional lymph nodes cannot be assessed  -Microcalcifications: Present in DCIS and nonneoplastic tissue     Hormone receptor results (performed with appropriate controls):  ER - Positive (moderate)  OK - Positive (moderate)           FINAL PATHOLOGIC DIAGNOSIS  11/16/2018    1. LEFT BREAST, RE-EXCISION:  No residual ductal carcinoma in-situ  Lobular carcinoma in-situ  Margins negative for  neoplasia or malignancy (closest margins are medial and inferior - 1mm, superior 6mm)  Atypical lobular hyperplasia  Intraductal papilloma  Negative for malignancy  Extensive previous procedure related changes present  (specimen has been submitted in it's entirety)  2. NEW ANTERIOR MARGIN, EXCISION:  Benign skin and subcutaneous tissue with extensive inflammation and procedure related changes  Negative for neoplasia or malignancy  (specimen has been submitted in its entirety)  Part1.  Immunostain E-Cadherin has been performed (with appropriate controls) on two sections #1C AND 1G and is  negative, supporting the above diagnosis.  Hormone receptors performed for LCIS (Part 1)  ER - Positive (90% of the tumor nuclei, moderate to strong)  NH - Positive (30% of the tumor nuclei, weak)  HER2 - Indeterminate (2+), specimen will be sent out for FISH analysis  Ki67 - 5%, positive tumor nuclei  [id number],ER,PGR        Bone Density  1/18/2018   Osteopenia.  Ten year probability of major osteoporotic fracture is 3.9%, and hip fracture is 0.2%.      mammo screening 6/15/21   Impression:  Right  Architectural Distortion: Right breast architectural distortion at the upper position. Assessment: 0 - Incomplete.      Left  There is no mammographic evidence of malignancy in the left breast.     BI-RADS Category:   Overall: 0 - Incomplete: Needs Additional Imaging Evaluation     Recommendation:  Diagnostic mammogram with possible ultrasound (if indicated) is recommended.        Results for STEFFANIE JIMENEZ (MRN [id number]) as of 10/18/2021 15:16   Ref. Range 10/15/2021 09:19   Vit D, 25-Hydroxy Latest Ref Range: 30 - 96 ng/mL 62       Assessment:       1. Ductal carcinoma in situ (DCIS) of left breast    2. Long term (current) use of aromatase inhibitors    3. Hot flashes related to aromatase inhibitor therapy    4. Osteoporosis without current pathological fracture, unspecified osteoporosis type          Plan:       1.,2.  Patient is  a 50 y.o. female with Stage 0 pTis N0 M0 grade 2 ER + MS + DCIS of the left breast status post left partial mastectomy with focally positive medial margin s/p re-excison on 11/16/18. Final pathology showed LCIS but no residual DCIS with negative with closest margins are medial and inferior - 1mm   S/p  adjuvant XRT  under the direction of Rad/Onc at [facility] completed 1/25/2019   [facility], negative Integrated BRACAnalysis with [facility]   Cont with Arimidex    She will continue with monthly self-breast exams    Follow-up screening Mammo  BI RADS 0 6/15/21 -further eval   Follow-up Diagnostic Mammo and US mammo diagnostic rt and US 6/28/21 BI-RADS Category: Overall: 1 - Negative  Cont  Clonidine 0.1mg po bid prn hot flashes   Overdue for Follow-up Mammo planned 6/2022   Plan schedule mammo    3 Cont  Clonidine 0.1mg po bid prn hot flashes   4. . Bone density 2019 recent reveals osteoporosis  Cont Ca and Vit D   Fosamax Rx refill  Follow-up bone density 9/2022  Continue weight bearing exercises  Avoidance of smoking    She will follow-up in 4  Mos with cbc,cmp prior to f/u     .    Advance Care Planning     Power of   I previously  initiated the process of advance care planning today and explained the importance of this process to the patient.  I introduced the concept of advance directives to the patient, as well. Then the patient received detailed information about the importance of designating a Health Care Power of  (HCPOA). She was also instructed to communicate with this person about their wishes for future healthcare, should she become sick and lose decision-making capacity. The patient has not previously appointed a HCPOA. After our discussion, the patient has decided to complete a HCPOA and has appointed her brother  Xiang Trinidad [phone] and NAME:  Leon Rivera Alireza [phone]  I spent a total time of 16  minutes discussing this issue with the patient.    Advance Care  Planning     Living Will  During previous visit, I engaged the patient in the advance care planning process.  The patient and I reviewed the role for advance directives and their purpose in directing future healthcare if the patient's unable to speak for him/herself.  At this point in time, the patient does have full decision-making capacity.  We discussed different extreme health states that she could experience, and reviewed what kind of medical care she would want in those situations.  The patient communicated that if she were comatose and had little chance of a meaningful recovery, she would not want machines/life-sustaining treatments used.    The patient has completed a living will to reflect these preferences.  The patient  has already designated a healthcare power of  to make decisions on her behalf.   I spent a total of  16  minutes engaging the patient in this advance care planning discussion.           Cc: MD Maryjo Pagan MD

## 2022-10-03 ENCOUNTER — OFFICE VISIT (OUTPATIENT)
Dept: SURGERY | Facility: CLINIC | Age: 52
End: 2022-10-03
Payer: MEDICAID

## 2022-10-03 ENCOUNTER — HOSPITAL ENCOUNTER (OUTPATIENT)
Dept: RADIOLOGY | Facility: HOSPITAL | Age: 52
Discharge: HOME | End: 2022-10-03
Attending: SURGERY
Payer: MEDICAID

## 2022-10-03 ENCOUNTER — TELEPHONE (OUTPATIENT)
Dept: PODIATRY | Facility: CLINIC | Age: 52
End: 2022-10-03
Payer: MEDICAID

## 2022-10-03 VITALS
SYSTOLIC BLOOD PRESSURE: 164 MMHG | HEART RATE: 84 BPM | BODY MASS INDEX: 26.21 KG/M2 | DIASTOLIC BLOOD PRESSURE: 82 MMHG | HEIGHT: 59 IN | WEIGHT: 130 LBS

## 2022-10-03 VITALS — HEIGHT: 59 IN | WEIGHT: 128 LBS | BODY MASS INDEX: 25.8 KG/M2

## 2022-10-03 DIAGNOSIS — L60.2 NAIL THICKENING: Primary | ICD-10-CM

## 2022-10-03 DIAGNOSIS — Z85.3 PERSONAL HISTORY OF BREAST CANCER: ICD-10-CM

## 2022-10-03 DIAGNOSIS — Z12.39 ENCOUNTER FOR SCREENING BREAST EXAMINATION: ICD-10-CM

## 2022-10-03 DIAGNOSIS — F41.9 ANXIETY: ICD-10-CM

## 2022-10-03 DIAGNOSIS — Z12.31 SCREENING MAMMOGRAM, ENCOUNTER FOR: ICD-10-CM

## 2022-10-03 DIAGNOSIS — Z91.89 AT HIGH RISK FOR BREAST CANCER: ICD-10-CM

## 2022-10-03 PROCEDURE — 77063 MAMMO DIGITAL SCREENING BILAT WITH TOMO: ICD-10-PCS | Mod: 26,,, | Performed by: RADIOLOGY

## 2022-10-03 PROCEDURE — 77067 SCR MAMMO BI INCL CAD: CPT | Mod: TC

## 2022-10-03 PROCEDURE — 3077F PR MOST RECENT SYSTOLIC BLOOD PRESSURE >= 140 MM HG: ICD-10-PCS | Mod: CPTII,,, | Performed by: NURSE PRACTITIONER

## 2022-10-03 PROCEDURE — 77063 BREAST TOMOSYNTHESIS BI: CPT | Mod: 26,,, | Performed by: RADIOLOGY

## 2022-10-03 PROCEDURE — 3044F PR MOST RECENT HEMOGLOBIN A1C LEVEL <7.0%: ICD-10-PCS | Mod: CPTII,,, | Performed by: NURSE PRACTITIONER

## 2022-10-03 PROCEDURE — 3077F SYST BP >= 140 MM HG: CPT | Mod: CPTII,,, | Performed by: NURSE PRACTITIONER

## 2022-10-03 PROCEDURE — 3044F HG A1C LEVEL LT 7.0%: CPT | Mod: CPTII,,, | Performed by: NURSE PRACTITIONER

## 2022-10-03 PROCEDURE — 77067 SCR MAMMO BI INCL CAD: CPT | Mod: 26,,, | Performed by: RADIOLOGY

## 2022-10-03 PROCEDURE — 99999 PR PBB SHADOW E&M-EST. PATIENT-LVL V: CPT | Mod: PBBFAC,,, | Performed by: NURSE PRACTITIONER

## 2022-10-03 PROCEDURE — 99215 OFFICE O/P EST HI 40 MIN: CPT | Mod: PBBFAC | Performed by: NURSE PRACTITIONER

## 2022-10-03 PROCEDURE — 1159F PR MEDICATION LIST DOCUMENTED IN MEDICAL RECORD: ICD-10-PCS | Mod: CPTII,,, | Performed by: NURSE PRACTITIONER

## 2022-10-03 PROCEDURE — 77063 BREAST TOMOSYNTHESIS BI: CPT | Mod: TC

## 2022-10-03 PROCEDURE — 99214 PR OFFICE/OUTPT VISIT, EST, LEVL IV, 30-39 MIN: ICD-10-PCS | Mod: S$PBB,,, | Performed by: NURSE PRACTITIONER

## 2022-10-03 PROCEDURE — 99999 PR PBB SHADOW E&M-EST. PATIENT-LVL V: ICD-10-PCS | Mod: PBBFAC,,, | Performed by: NURSE PRACTITIONER

## 2022-10-03 PROCEDURE — 99214 OFFICE O/P EST MOD 30 MIN: CPT | Mod: S$PBB,,, | Performed by: NURSE PRACTITIONER

## 2022-10-03 PROCEDURE — 3079F DIAST BP 80-89 MM HG: CPT | Mod: CPTII,,, | Performed by: NURSE PRACTITIONER

## 2022-10-03 PROCEDURE — 1159F MED LIST DOCD IN RCRD: CPT | Mod: CPTII,,, | Performed by: NURSE PRACTITIONER

## 2022-10-03 PROCEDURE — 3079F PR MOST RECENT DIASTOLIC BLOOD PRESSURE 80-89 MM HG: ICD-10-PCS | Mod: CPTII,,, | Performed by: NURSE PRACTITIONER

## 2022-10-03 PROCEDURE — 3008F PR BODY MASS INDEX (BMI) DOCUMENTED: ICD-10-PCS | Mod: CPTII,,, | Performed by: NURSE PRACTITIONER

## 2022-10-03 PROCEDURE — 77067 MAMMO DIGITAL SCREENING BILAT WITH TOMO: ICD-10-PCS | Mod: 26,,, | Performed by: RADIOLOGY

## 2022-10-03 PROCEDURE — 3008F BODY MASS INDEX DOCD: CPT | Mod: CPTII,,, | Performed by: NURSE PRACTITIONER

## 2022-10-03 RX ORDER — LORAZEPAM 0.5 MG/1
0.5 TABLET ORAL
Qty: 2 TABLET | Refills: 0 | Status: SHIPPED | OUTPATIENT
Start: 2022-10-03

## 2022-10-03 NOTE — TELEPHONE ENCOUNTER
----- Message from Shaila Osborne NP sent at 10/3/2022  2:05 PM CDT -----  Regarding: referral  I saw a patient today who would like to be seen by Dr. Lima for right toe nail thickening. Her  is a patient of his. Her PCP placed a referral back in April and has not heard from anyone. Can you please assist Ms. Jc in scheduling.    Thank you  Shaila

## 2022-10-03 NOTE — PROGRESS NOTES
[facility] [place]  Department of Surgery    REFERRING PROVIDER: No referring provider defined for this encounter. Parth Wolfe MD  CHIEF COMPLAINT:   Chief Complaint   Patient presents with    Follow-up     CBE       DIAGNOSIS:   This is a 52 y.o. female with a history of stage  pTis N0 M0 grade 2 ER + MA + DCIS of the left breast.    TREATMENT:   1. left partial mastectomy  performed at [facility] with focally positive medial margin and now re-excison on 11/16/2018. Final pathology showed LCIS but no residual DCIS. ALEYDA Matias M.D. Surgical Oncology  2. Radiation therapy from 12/26/2018  to 1/25/2019. LANI Vázquez M.D. Radiation Oncology   3. Adjuvant endocrine therapy started on 3/2019. DIANE Najera M.D. Medical Oncology     HISTORY OF PRESENT ILLNESS:   Lou Jc is a 52 y.o. female comes in for oncological follow up. She denies change in her breast self-exam specifically denying new masses, skin or nipple changes, or nipple discharge. Does complain of right big toe nail thickening. She states that this could be a sdie effect of the Arimidex. Also reports inhaling ROundup causing significant mouth and throat argueta. Does report a change in her voice as well. Past medical and surgical history is updated with no new changes. There have been no changes to family history. The patient denies constitutional symptoms of night sweats, chills, weight loss, new headaches, visual changes, new back or bony pain, chest pain, or shortness of breath.        Review of Systems: See HPI/Interval History for other systems reviewed.     IMAGING:   10/3/2022 Mammo Digital Screening Bilat w/ Caden     History:  Patient is 52 y.o. and is seen for a screening mammogram.     Films Compared:  Prior images (if available) were compared.     Findings:  This procedure was performed using tomosynthesis. Computer-aided detection was utilized in the interpretation of this examination.  The breasts are extremely dense, which lowers the  sensitivity of mammography.      Right  There is a focal asymmetry seen in the upper outer quadrant of the right breast in the posterior depth.      Left  There is no evidence of suspicious masses, calcifications, or other abnormal findings in the left breast.     Impression:  Right  Focal Asymmetry: Right breast focal asymmetry at the upper outer posterior position. Assessment: 0 - Incomplete. Diagnostic Mammogram and/or Ultrasound is recommended.      Left  There is no mammographic evidence of malignancy in the left breast.     BI-RADS Category:   Overall: 0 - Incomplete: Needs Additional Imaging Evaluation     MEDICATIONS/ALLERGIES:     Current Outpatient Medications   Medication Sig    amoxicillin (AMOXIL) 500 MG capsule Take 1 capsule (500 mg total) by mouth every 8 (eight) hours.    anastrozole (ARIMIDEX) 1 mg Tab Take 1 tablet by mouth once daily    anastrozole (ARIMIDEX) 1 mg Tab Take 1 tablet (1 mg total) by mouth once daily.    anastrozole (ARIMIDEX) 1 mg Tab Take 1 tablet by mouth once daily    aspirin-acetaminophen-caffeine 250-250-65 mg (EXCEDRIN MIGRAINE) 250-250-65 mg per tablet Take 1 tablet by mouth every 6 (six) hours as needed for Pain.    azithromycin (Z-SAVANNAH) 250 MG tablet Take 1 tablet (250 mg total) by mouth once daily. 1 pack, take as directed    calcium-vitamin D3 (OS-EMERALD 500 + D3) 500 mg-5 mcg (200 unit) per tablet Take 1 tablet by mouth 2 (two) times daily with meals.    carbamide peroxide (DEBROX) 6.5 % otic solution Place 5 drops into both ears 2 (two) times daily.    ciclopirox (PENLAC) 8 % Soln Apply topically nightly.    clobetasol 0.05% (TEMOVATE) 0.05 % Oint Apply topically 2 (two) times daily.    clonazePAM (KLONOPIN) 1 MG tablet Take 1 tablet (1 mg total) by mouth every evening.    clonazePAM (KLONOPIN) 1 MG tablet Take 1 tablet (1 mg total) by mouth every evening.    cloNIDine (CATAPRES) 0.1 MG tablet Take 1 tablet (0.1 mg total) by mouth 2 (two) times daily.    ergocalciferol  "(ERGOCALCIFEROL) 50,000 unit Cap Take 1 capsule by mouth once a week    hydrocortisone 2.5 % cream Apply topically 2 (two) times daily.    hydrocortisone 2.5 % ointment Apply topically 2 (two) times daily. To affected area    hydrOXYzine HCL (ATARAX) 25 MG tablet Take 1 tablet (25 mg total) by mouth every 6 (six) hours as needed for Itching.    ibuprofen (ADVIL,MOTRIN) 800 MG tablet every 6 (six) hours as needed.     ketoconazole (NIZORAL) 200 mg Tab Take 1 tablet (200 mg total) by mouth once daily.    ketoconazole, bulk, Powd 1 application by Misc.(Non-Drug; Combo Route) route 2 (two) times daily.    loratadine (CLARITIN) 10 mg tablet Take 1 tablet (10 mg total) by mouth once daily.    loratadine (CLARITIN) 10 mg tablet Take 1 tablet (10 mg total) by mouth once daily.    mupirocin (BACTROBAN) 2 % ointment Apply topically 2 (two) times daily. AAA    naproxen (NAPROSYN) 500 MG tablet TAKE 1 TABLET BY MOUTH TWICE DAILY WITH MEALS    NYSTOP powder APPLY  POWDER TOPICALLY TO AFFECTED AREA TWICE DAILY    ondansetron (ZOFRAN) 4 MG tablet TAKE 1 TABLET BY MOUTH EVERY 8 HOURS AS NEEDED FOR NAUSEA    pantoprazole (PROTONIX) 40 MG tablet Take 1 tablet (40 mg total) by mouth once daily.    permethrin (ELIMITE) 5 % cream Apply 5% cream to entire body from the neck down, then wash off after 8 to 14 hours. Repeat if symptoms have not resolved in 14 days.    traMADoL (ULTRAM) 50 mg tablet Take 1 tablet (50 mg total) by mouth every 12 (twelve) hours as needed for Pain.    alendronate (FOSAMAX) 70 MG tablet Take 1 tablet (70 mg total) by mouth every 7 days.    diclofenac sodium (VOLTAREN) 1 % Gel Apply 2 g topically 4 (four) times daily. for 10 days     No current facility-administered medications for this visit.      Review of patient's allergies indicates:  No Known Allergies    PHYSICAL EXAM:   BP (!) 164/82 (BP Location: Right arm, Patient Position: Sitting, BP Method: Medium (Automatic))   Pulse 84   Ht 4' 11" (1.499 m)   Wt " 59 kg (130 lb)   LMP 06/27/2016   BMI 26.26 kg/m²     Physical Exam   Constitutional: She is oriented to person, place, and time.   HENT:   Head: Normocephalic.   Eyes: Right eye exhibits no discharge. Left eye exhibits no discharge.   Cardiovascular:  Normal rate.            Pulmonary/Chest: Effort normal. No respiratory distress. She has no wheezes. Right breast exhibits no inverted nipple, no mass, no nipple discharge, no skin change and no tenderness. Left breast exhibits no inverted nipple, no mass, no nipple discharge, no skin change and no tenderness. No breast swelling. Breasts are symmetrical.   Abdominal: Normal appearance.   Genitourinary: No breast swelling.   Musculoskeletal: Normal range of motion.   Lymphadenopathy:     She has no cervical adenopathy.   Neurological: She is alert and oriented to person, place, and time.   Skin: Skin is warm and dry. No erythema. No pallor.       ASSESSMENT:   This is a 52 y.o. female without evidence of recurrence by exam, history or imaging.       PLAN:   1. Continue to follow up with Dr. Najera    2. Continue monthly self breast exams and call the clinic with any changes or problems.  3. Right Diagnostic Mammogram now for further evaluation of focal asymmetry in the UOQ  4. We re-discussed addition of yearly MRIs due to history of breast cancer, LCIS and extremely dense breast; patient agreeable and I will send in an Anxiolytic. MRI in 6 months   5. If follow up imaging is negative, patient can return to clinic in 1 year .    The patient is in agreement with the plan. Questions were encouraged and answered to patient's satisfaction. Lou will call our office with any questions or concerns.

## 2022-10-03 NOTE — Clinical Note
Hi again,  Can you schedule Ms. Blake for MRI in 6 months please as well as serum creatinine.  Also need a recall in 1 year for CBE with mmmg  Thanks Trinity

## 2022-10-04 ENCOUNTER — TELEPHONE (OUTPATIENT)
Dept: RADIOLOGY | Facility: HOSPITAL | Age: 52
End: 2022-10-04
Payer: MEDICAID

## 2022-10-04 NOTE — TELEPHONE ENCOUNTER
Spoke with patient and explained mammogram findings.Patient expressed understanding of results. Patient scheduled abnormal mammogram follow up appointment at [facility] on 10/4/2022.

## 2022-10-05 ENCOUNTER — HOSPITAL ENCOUNTER (OUTPATIENT)
Dept: RADIOLOGY | Facility: HOSPITAL | Age: 52
Discharge: HOME | End: 2022-10-05
Attending: SURGERY
Payer: MEDICAID

## 2022-10-05 DIAGNOSIS — R92.8 ABNORMAL FINDING ON BREAST IMAGING: ICD-10-CM

## 2022-10-05 PROCEDURE — 77065 MAMMO DIGITAL DIAGNOSTIC RIGHT WITH TOMO: ICD-10-PCS | Mod: 26,RT,, | Performed by: RADIOLOGY

## 2022-10-05 PROCEDURE — 76642 US BREAST RIGHT LIMITED: ICD-10-PCS | Mod: 26,RT,, | Performed by: RADIOLOGY

## 2022-10-05 PROCEDURE — 77061 BREAST TOMOSYNTHESIS UNI: CPT | Mod: 26,RT,, | Performed by: RADIOLOGY

## 2022-10-05 PROCEDURE — 76642 ULTRASOUND BREAST LIMITED: CPT | Mod: TC,RT

## 2022-10-05 PROCEDURE — 76642 ULTRASOUND BREAST LIMITED: CPT | Mod: 26,RT,, | Performed by: RADIOLOGY

## 2022-10-05 PROCEDURE — 77065 DX MAMMO INCL CAD UNI: CPT | Mod: TC,RT

## 2022-10-05 PROCEDURE — 77065 DX MAMMO INCL CAD UNI: CPT | Mod: 26,RT,, | Performed by: RADIOLOGY

## 2022-10-05 PROCEDURE — 77061 MAMMO DIGITAL DIAGNOSTIC RIGHT WITH TOMO: ICD-10-PCS | Mod: 26,RT,, | Performed by: RADIOLOGY

## 2022-10-16 NOTE — PROGRESS NOTES
Subjective:           Patient ID: Lou Jc is a 51 y.o. female.    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 with positive margin s/p re-excison on 11/16/18    S/p adjuvant XRT left breast under direction of Dr. Vázquez at [facility] on 1/25/2019     Arimidex 3/2019- present            Prior Hx: Pt is a  51  y.o. female seen today for follow-up  Left breast cancer. She was initially seen at [facility] ([facility] in [place], [place]). She had a BI-RADS 0 bilateral screening mammogram for architectural distortion and subsequent bilateral diagnostic mammogram and left breast ultrasound that were BI-RADS 4 and revealed focal asymmetry at the outer central position left breast She subsequently underwent ultrasound-guided core needle biopsy on 8/17/18 with pathology showing fragmented papilloma with no evidence of atypia or in-situ or invasive carcinoma. She was then seen by General Surgery at [facility] on 8/31/18 to discuss wire localization excisional biopsy of this intraductal papilloma, which ultimately occurred on 9/21/18. Final pathology from the excision biopsy upstaged to a 2.0 cm grade 2 ER+ (moderate) SD+ (moderate) ductal carcinoma in situ with focally positive medial margin with no evidence of invasive carcinoma. She subsequently underwent re-excison on 11/16/18. Final pathology showed LCIS but no residual DCIS. Margins uninvolved with closest margins are medial and inferior - 1mm, superior 6mm. She routinely performs self breast exams. She has not noted a change on breast exam. Patient denies nipple discharge. She reports she has not undergone annual routine screening mammograms. She reports history of previous breast bx in 2015- pathologically benign. She underwent [facility] testing with was negative . She is followed by Rad/OncShe has completed adjuvant XRT left breast to a dose of 5005cGy under  direction of Dr. Vázquez at [facility] on 1/25/2019 . She started on Arimidex 3/2019.     Interval Hx; No new issues  Doing well  She has intentional wt loss   No SOB/CP/cough   Appetite and weight stable  No arthralgias  Mild hot flashes      mammo diagnostic rt and US 6/28/21   BI-RADS Category: Overall: 1 - Negative      [facility], negative Integrated [facility] with [facility]      Risk Factors/Breast History Age of Menarche: 10y.o. Age of Menopause: 48. LMP 2016. History of Hormonal Therapy: OCPs x 4 years. No HRT.Nulliparous Family History of Breast Ca: Maternal aunt (age 60s). Maternal aunt (age 70s). Paternal aunt (age 70s).   No Family hx of Ovarian Cancer. Other Family History: Father with pancreatic cancer.         Social History .She is a chronic smoker. She has smoked 1ppd x 25 yrs. She drinks 2 beers/week.          Past Medical History:   Diagnosis Date    Allergy     Breast cancer 2018    Breast cyst     DCIS (ductal carcinoma in situ)     Left    Endometriosis     Papilloma of breast     Reflux          Past Surgical History:   Procedure Laterality Date    BREAST BIOPSY Left     DCIS    BREAST CYST ASPIRATION Left     BREAST LUMPECTOMY Left 2018    2 lumpectomy    diagnostic lap      TONSILLECTOMY         Review of Systems   Constitutional: Negative for appetite change, fatigue, fever and unexpected weight change.   HENT: Negative for mouth sores.    Eyes: Negative for visual disturbance.   Respiratory: Negative for cough and shortness of breath.    Cardiovascular: Negative for chest pain.   Gastrointestinal: Negative for abdominal pain and diarrhea.   Endocrine:        Mild hot flashes    Genitourinary: Negative for frequency.   Musculoskeletal: Negative for back pain.   Skin: Negative for rash.   Neurological: Negative for headaches.   Hematological: Negative for adenopathy.   Psychiatric/Behavioral: The patient is not nervous/anxious.        Objective:          Vitals:    02/28/22 1358  "  BP: 115/65   BP Location: Right arm   Patient Position: Sitting   Pulse: 96   Temp: 98.2 °F (36.8 °C)   TempSrc: Oral   SpO2: 95%   Weight: 58.9 kg (129 lb 13.6 oz)   Height: 4' 11" (1.499 m)       Physical Exam   Constitutional: She is oriented to person, place, and time. She appears well-developed and well-nourished.   HENT:   Head: Normocephalic.   Mouth/Throat: Oropharynx is clear and moist. No oropharyngeal exudate.   Eyes: Conjunctivae and lids are normal. Pupils are equal, round, and reactive to light. No scleral icterus.   Neck: Normal range of motion. Neck supple. No thyromegaly present.   Cardiovascular: Normal rate, regular rhythm and normal heart sounds.    No murmur heard.  Pulmonary/Chest: Breath sounds normal. She has no wheezes. She has no rales.   Abdominal: Soft. Bowel sounds are normal. She exhibits no distension and no mass. There is no hepatosplenomegaly. There is no tenderness. There is no rebound and no guarding.   Musculoskeletal: Normal range of motion. She exhibits no edema and no tenderness.   Right breast exhibits tenderness. Right breast exhibits no mass, no nipple discharge and no skin change. Left breast exhibits tenderness. Left breast exhibits no mass, no nipple discharge and no skin change.   Lymphadenopathy:     She has no cervical adenopathy.     She has no axillary adenopathy.        Right: No supraclavicular adenopathy present.        Left: No supraclavicular adenopathy present.   Neurological: She is alert and oriented to person, place, and time. No cranial nerve deficit. Coordination normal.   Skin: Skin is warm and dry. No ecchymosis, no petechiae and no rash noted. No erythema.   Psychiatric: She has a normal mood and affect.           Lab Results   Component Value Date    WBC 6.47 02/25/2022    HGB 11.5 (L) 02/25/2022    HCT 35.6 (L) 02/25/2022    MCV 96 02/25/2022     02/25/2022     Results for STEFFANIE JIMENEZ (MRN [id number]) as of 1/10/2019 09:20   Ref. Range " 12/6/2018 10:27   Estradiol Latest Ref Range: See Text pg/mL <10 (A)   Testosterone, Free Latest Units: pg/mL 1.8     FINAL PATHOLOGIC DIAGNOSIS 9/21/2018   Left breast, wire localized lumpectomy:  -Ductal carcinoma in situ (DCIS), Grade 2 (intermediate), focally involving the medial margin, present in 5  blocks with an estimated size of at least 20 mm (see cancer case summary below)  -Background breast tissue with fibrocystic change including dense fibrosis, papillomatosis, adenosis, cystic  dilatation, apocrine metaplasia, and fibroadenomatoid nodules  -Biopsy site changes  -No evidence of invasive carcinoma  Surgical pathology cancer case summary:  -Procedure: Excision (less than total mastectomy)  -Specimen laterality: Left  -Size (extent) of DCIS: Estimated size of DCIS: At least 20 mm: Number blocks with DCIS: 5  -Histologic type: Ductal carcinoma in situ  -Architectural patterns: Solid  -Nuclear grade: Grade 2 (intermediate)  -Necrosis: Not identified  -Margins: Medial margin focally positive for DCIS  -Regional lymph nodes: No lymph nodes submitted or found  -Pathologic stage classification (pTNM):  Primary tumor (pT): pTis (DCIS): Ductal carcinoma in situ  Regional lymph nodes (pN): pNX: Regional lymph nodes cannot be assessed  -Microcalcifications: Present in DCIS and nonneoplastic tissue     Hormone receptor results (performed with appropriate controls):  ER - Positive (moderate)  AL - Positive (moderate)           FINAL PATHOLOGIC DIAGNOSIS  11/16/2018    1. LEFT BREAST, RE-EXCISION:  No residual ductal carcinoma in-situ  Lobular carcinoma in-situ  Margins negative for neoplasia or malignancy (closest margins are medial and inferior - 1mm, superior 6mm)  Atypical lobular hyperplasia  Intraductal papilloma  Negative for malignancy  Extensive previous procedure related changes present  (specimen has been submitted in it's entirety)  2. NEW ANTERIOR MARGIN, EXCISION:  Benign skin and subcutaneous tissue  with extensive inflammation and procedure related changes  Negative for neoplasia or malignancy  (specimen has been submitted in its entirety)  Part1.  Immunostain E-Cadherin has been performed (with appropriate controls) on two sections #1C AND 1G and is  negative, supporting the above diagnosis.  Hormone receptors performed for LCIS (Part 1)  ER - Positive (90% of the tumor nuclei, moderate to strong)  NV - Positive (30% of the tumor nuclei, weak)  HER2 - Indeterminate (2+), specimen will be sent out for FISH analysis  Ki67 - 5%, positive tumor nuclei  [id number],ER,PGR        Bone Density  1/18/2018   Osteopenia.  Ten year probability of major osteoporotic fracture is 3.9%, and hip fracture is 0.2%.      mammo screening 6/15/21   Impression:  Right  Architectural Distortion: Right breast architectural distortion at the upper position. Assessment: 0 - Incomplete.      Left  There is no mammographic evidence of malignancy in the left breast.     BI-RADS Category:   Overall: 0 - Incomplete: Needs Additional Imaging Evaluation     Recommendation:  Diagnostic mammogram with possible ultrasound (if indicated) is recommended.        Results for STEFFANIE JIMENEZ (MRN [id number]) as of 10/18/2021 15:16   Ref. Range 10/15/2021 09:19   Vit D, 25-Hydroxy Latest Ref Range: 30 - 96 ng/mL 62       Assessment:       1. Ductal carcinoma in situ (DCIS) of left breast    2. Hot flashes related to aromatase inhibitor therapy    3. Osteoporosis without current pathological fracture, unspecified osteoporosis type    4. Health care maintenance        Plan:       1.,2.  Patient is a 50 y.o. female with Stage 0 pTis N0 M0 grade 2 ER + NV + DCIS of the left breast status post left partial mastectomy with focally positive medial margin s/p re-excison on 11/16/18. Final pathology showed LCIS but no residual DCIS with negative with closest margins are medial and inferior - 1mm   S/p  adjuvant XRT  under the direction of Rad/Onc at [facility]  completed 1/25/2019   [employer] [facility], negative Integrated [facility]ACAnalysis with [facility]   Cont with Arimidex    She will continue with monthly self-breast exams    Follow-up screening Mammo  BI RADS 0 6/15/21 -further eval   Follow-up Diagnostic Mammo and US mammo diagnostic rt and US 6/28/21 BI-RADS Category: Overall: 1 - Negative  Cont  Clonidine 0.1mg po bid prn hot flashes   Follow-up Mammo planned 6/2022     3. Bone density 2019 recent reveals osteoporosis  Cont Ca and Vit D   Fosamax Rx refill  Follow-up bone density 9/2022  Continue weight bearing exercises  Avoidance of smoking    4. Ambulatory Referral to GI ,screening colonoscopy    She will follow-up in 4  Mos with cbc,cmp prior to f/u     .    Advance Care Planning     Power of   I previously  initiated the process of advance care planning today and explained the importance of this process to the patient.  I introduced the concept of advance directives to the patient, as well. Then the patient received detailed information about the importance of designating a Health Care Power of  (HCPOA). She was also instructed to communicate with this person about their wishes for future healthcare, should she become sick and lose decision-making capacity. The patient has not previously appointed a HCPOA. After our discussion, the patient has decided to complete a HCPOA and has appointed her brother  Xiang Trinidad [phone] and NAME:  Leon Lay [phone]  I spent a total time of 16  minutes discussing this issue with the patient.    Advance Care Planning     Living Will  During previous visit, I engaged the patient in the advance care planning process.  The patient and I reviewed the role for advance directives and their purpose in directing future healthcare if the patient's unable to speak for him/herself.  At this point in time, the patient does have full decision-making capacity.  We discussed different extreme health states  that she could experience, and reviewed what kind of medical care she would want in those situations.  The patient communicated that if she were comatose and had little chance of a meaningful recovery, she would not want machines/life-sustaining treatments used.    The patient has completed a living will to reflect these preferences.  The patient  has already designated a healthcare power of  to make decisions on her behalf.   I spent a total of  16  minutes engaging the patient in this advance care planning discussion.           Cc: MD Maryjo Pagan MD                                  Yes

## 2022-10-24 ENCOUNTER — OFFICE VISIT (OUTPATIENT)
Dept: PODIATRY | Facility: CLINIC | Age: 52
End: 2022-10-24
Payer: MEDICAID

## 2022-10-24 VITALS — HEIGHT: 59 IN | WEIGHT: 128.06 LBS | BODY MASS INDEX: 25.82 KG/M2

## 2022-10-24 DIAGNOSIS — B35.3 TINEA PEDIS, UNSPECIFIED LATERALITY: ICD-10-CM

## 2022-10-24 DIAGNOSIS — B35.1 ONYCHOMYCOSIS: Primary | ICD-10-CM

## 2022-10-24 PROCEDURE — 1159F PR MEDICATION LIST DOCUMENTED IN MEDICAL RECORD: ICD-10-PCS | Mod: CPTII,,, | Performed by: PODIATRIST

## 2022-10-24 PROCEDURE — 99215 OFFICE O/P EST HI 40 MIN: CPT | Mod: PBBFAC,PO | Performed by: PODIATRIST

## 2022-10-24 PROCEDURE — 99999 PR PBB SHADOW E&M-EST. PATIENT-LVL V: ICD-10-PCS | Mod: PBBFAC,,, | Performed by: PODIATRIST

## 2022-10-24 PROCEDURE — 99214 PR OFFICE/OUTPT VISIT, EST, LEVL IV, 30-39 MIN: ICD-10-PCS | Mod: S$PBB,,, | Performed by: PODIATRIST

## 2022-10-24 PROCEDURE — 3044F HG A1C LEVEL LT 7.0%: CPT | Mod: CPTII,,, | Performed by: PODIATRIST

## 2022-10-24 PROCEDURE — 99214 OFFICE O/P EST MOD 30 MIN: CPT | Mod: S$PBB,,, | Performed by: PODIATRIST

## 2022-10-24 PROCEDURE — 99999 PR PBB SHADOW E&M-EST. PATIENT-LVL V: CPT | Mod: PBBFAC,,, | Performed by: PODIATRIST

## 2022-10-24 PROCEDURE — 1159F MED LIST DOCD IN RCRD: CPT | Mod: CPTII,,, | Performed by: PODIATRIST

## 2022-10-24 PROCEDURE — 3044F PR MOST RECENT HEMOGLOBIN A1C LEVEL <7.0%: ICD-10-PCS | Mod: CPTII,,, | Performed by: PODIATRIST

## 2022-10-24 RX ORDER — CICLOPIROX 80 MG/ML
SOLUTION TOPICAL NIGHTLY
Qty: 6.6 ML | Refills: 3 | Status: SHIPPED | OUTPATIENT
Start: 2022-10-24

## 2022-10-24 RX ORDER — KETOCONAZOLE 20 MG/G
CREAM TOPICAL DAILY
Qty: 60 G | Refills: 3 | Status: SHIPPED | OUTPATIENT
Start: 2022-10-24

## 2022-10-24 RX ORDER — GENTIAN VIOLET 1% 10 MG/ML
0.5 LIQUID TOPICAL 4 TIMES DAILY
Qty: 59 ML | Refills: 0 | Status: SHIPPED | OUTPATIENT
Start: 2022-10-24

## 2022-10-24 NOTE — PATIENT INSTRUCTIONS
Kerasal for fungal nails apply daily to all toenails.  Can be found at [facility]

## 2022-10-25 NOTE — PROGRESS NOTES
Subjective:      Patient ID: Lou Jc is a 52 y.o. female.    Chief Complaint: Nail Problem    Lou is a 52 y.o. female who presents to the clinic complaining of thick and discolored toenails on the right foot. Lou is inquiring about treatment options.      Review of Systems   Constitutional: Negative for chills.   Cardiovascular:  Negative for chest pain and claudication.   Respiratory:  Negative for cough.    Skin:  Positive for color change, dry skin and nail changes.   Musculoskeletal:  Positive for joint pain.   Gastrointestinal:  Negative for nausea.   Neurological:  Positive for paresthesias. Negative for numbness.   Psychiatric/Behavioral:  The patient is not nervous/anxious.          Objective:      Physical Exam  Constitutional:       Appearance: She is well-developed.      Comments: Oriented to time, place, and person.   Cardiovascular:      Comments: DP and PT pulses are palpable bilaterally. 3 sec capillary refill time and toes and feet are warm to touch proximally .  There is  hair growth on the feet and toes b/l. There is no edema b/l. No spider veins or varicosities present b/l.     Musculoskeletal:      Comments: Equinus noted b/l ankles with < 10 deg DF noted. MMT 5/5 in DF/PF/Inv/Ev resistance with no reproduction of pain in any direction. Passive range of motion of ankle and pedal joints is painless b/l.     Feet:      Right foot:      Skin integrity: No callus or dry skin.      Left foot:      Skin integrity: No callus or dry skin.   Lymphadenopathy:      Comments: Negative lymphadenopathy bilateral popliteal fossa and tarsal tunnel.   Skin:     Comments: Toenails 1-5 bilaterally are elongated by 2-3 mm, thickened by 2-3 mm, discolored/yellowed, dystrophic, brittle with subungual debris.    Scaling dryness in a moccasin distribution is noted to the bilateral lower extremities with associated erythema.           Neurological:      Mental Status: She is alert.      Comments: Light  touch, proprioception, and sharp/dull sensation are all intact bilaterally. Protective threshold with the Martin-Wienstein monofilament is intact bilaterally.    Psychiatric:         Behavior: Behavior is cooperative.             Assessment:       Encounter Diagnoses   Name Primary?    Tinea pedis, unspecified laterality     Onychomycosis Yes         Plan:       Lou was seen today for nail problem.    Diagnoses and all orders for this visit:    Onychomycosis  -     ciclopirox (PENLAC) 8 % Soln; Apply topically nightly.  -     ketoconazole (NIZORAL) 2 % cream; Apply topically once daily.    Tinea pedis, unspecified laterality  -     Ambulatory referral/consult to Podiatry    Other orders  -     gentian violet 1 % topical solution; Use as directed 0.5 mLs in the mouth or throat 4 (four) times daily.    I counseled the patient on her conditions, their implications and medical management.      At patient's request, I discussed different treatments for toenail fungus. We discussed oral antifungals but I did not recommend them as a first line treatment since the medication is taken internally and can have side effects such as rash, taste disturbances, and liver enzyme elevation. We discussed topical Penlac to be applied daily and removed weekly. Pt. Expresses understanding and would like to try the Penlac. Rx sent to the pharmacy.     Ketoconazole 2% topical cream prescribed for treatment of aforementioned tinea pedis. Patient will use this medication as directed in addition to thourougly drying between toes daily, and applying powder as needed    Rx. Gentian violet    Instructed patient on the importance of keeping feet dry. Patient instructed to use absorbent cotton socks and change them if they become sweaty; or wear an open-toe shoe or sandal. Wash the feet at least once a day with soap and water. Patient instructed to use lysol or over-the-counter antifungal powders or sprays to shoes daily and allow them to air  dry, switching shoes from every other day would be optimal. Patient is to avoid barefoot walking in high-risk environments (public showers, gyms and locker rooms) may prevent future infections.     RTC PRN

## 2022-10-26 ENCOUNTER — TELEPHONE (OUTPATIENT)
Dept: PODIATRY | Facility: CLINIC | Age: 52
End: 2022-10-26
Payer: MEDICAID

## 2022-12-05 ENCOUNTER — HOSPITAL ENCOUNTER (OUTPATIENT)
Dept: RADIOLOGY | Facility: CLINIC | Age: 52
Discharge: HOME | End: 2022-12-05
Attending: INTERNAL MEDICINE
Payer: MEDICAID

## 2022-12-05 DIAGNOSIS — Z79.811 LONG TERM (CURRENT) USE OF AROMATASE INHIBITORS: ICD-10-CM

## 2022-12-05 PROCEDURE — 77080 DXA BONE DENSITY AXIAL: CPT | Mod: TC,PO

## 2022-12-05 PROCEDURE — 77080 DEXA BONE DENSITY SPINE HIP: ICD-10-PCS | Mod: 26,,, | Performed by: INTERNAL MEDICINE

## 2022-12-05 PROCEDURE — 77080 DXA BONE DENSITY AXIAL: CPT | Mod: 26,,, | Performed by: INTERNAL MEDICINE

## 2023-01-06 ENCOUNTER — LAB VISIT (OUTPATIENT)
Dept: LAB | Facility: HOSPITAL | Age: 53
End: 2023-01-06
Attending: INTERNAL MEDICINE
Payer: MEDICAID

## 2023-01-06 DIAGNOSIS — D05.12 DUCTAL CARCINOMA IN SITU (DCIS) OF LEFT BREAST: ICD-10-CM

## 2023-01-06 LAB
ALBUMIN SERPL BCP-MCNC: 3.9 G/DL (ref 3.5–5.2)
ALP SERPL-CCNC: 105 U/L (ref 55–135)
ALT SERPL W/O P-5'-P-CCNC: 27 U/L (ref 10–44)
ANION GAP SERPL CALC-SCNC: 8 MMOL/L (ref 8–16)
AST SERPL-CCNC: 21 U/L (ref 10–40)
BASOPHILS # BLD AUTO: 0.03 K/UL (ref 0–0.2)
BASOPHILS NFR BLD: 0.4 % (ref 0–1.9)
BILIRUB SERPL-MCNC: 0.2 MG/DL (ref 0.1–1)
BUN SERPL-MCNC: 13 MG/DL (ref 6–20)
CALCIUM SERPL-MCNC: 9.4 MG/DL (ref 8.7–10.5)
CHLORIDE SERPL-SCNC: 108 MMOL/L (ref 95–110)
CO2 SERPL-SCNC: 25 MMOL/L (ref 23–29)
CREAT SERPL-MCNC: 0.8 MG/DL (ref 0.5–1.4)
DIFFERENTIAL METHOD: NORMAL
EOSINOPHIL # BLD AUTO: 0.2 K/UL (ref 0–0.5)
EOSINOPHIL NFR BLD: 3.1 % (ref 0–8)
ERYTHROCYTE [DISTWIDTH] IN BLOOD BY AUTOMATED COUNT: 14 % (ref 11.5–14.5)
EST. GFR  (NO RACE VARIABLE): >60 ML/MIN/1.73 M^2
GLUCOSE SERPL-MCNC: 118 MG/DL (ref 70–110)
HCT VFR BLD AUTO: 38.7 % (ref 37–48.5)
HGB BLD-MCNC: 12.9 G/DL (ref 12–16)
IMM GRANULOCYTES # BLD AUTO: 0.02 K/UL (ref 0–0.04)
IMM GRANULOCYTES NFR BLD AUTO: 0.3 % (ref 0–0.5)
LYMPHOCYTES # BLD AUTO: 2 K/UL (ref 1–4.8)
LYMPHOCYTES NFR BLD: 26.8 % (ref 18–48)
MCH RBC QN AUTO: 30.1 PG (ref 27–31)
MCHC RBC AUTO-ENTMCNC: 33.3 G/DL (ref 32–36)
MCV RBC AUTO: 90 FL (ref 82–98)
MONOCYTES # BLD AUTO: 0.4 K/UL (ref 0.3–1)
MONOCYTES NFR BLD: 5.4 % (ref 4–15)
NEUTROPHILS # BLD AUTO: 4.8 K/UL (ref 1.8–7.7)
NEUTROPHILS NFR BLD: 64 % (ref 38–73)
NRBC BLD-RTO: 0 /100 WBC
PLATELET # BLD AUTO: 235 K/UL (ref 150–450)
PMV BLD AUTO: 11.3 FL (ref 9.2–12.9)
POTASSIUM SERPL-SCNC: 4 MMOL/L (ref 3.5–5.1)
PROT SERPL-MCNC: 6.6 G/DL (ref 6–8.4)
RBC # BLD AUTO: 4.28 M/UL (ref 4–5.4)
SODIUM SERPL-SCNC: 141 MMOL/L (ref 136–145)
WBC # BLD AUTO: 7.47 K/UL (ref 3.9–12.7)

## 2023-01-06 PROCEDURE — 36415 COLL VENOUS BLD VENIPUNCTURE: CPT | Performed by: INTERNAL MEDICINE

## 2023-01-06 PROCEDURE — 80053 COMPREHEN METABOLIC PANEL: CPT | Performed by: INTERNAL MEDICINE

## 2023-01-06 PROCEDURE — 85025 COMPLETE CBC W/AUTO DIFF WBC: CPT | Performed by: INTERNAL MEDICINE

## 2023-01-13 DIAGNOSIS — D05.12 DUCTAL CARCINOMA IN SITU (DCIS) OF LEFT BREAST: Primary | ICD-10-CM

## 2023-01-19 ENCOUNTER — OFFICE VISIT (OUTPATIENT)
Dept: HEMATOLOGY/ONCOLOGY | Facility: CLINIC | Age: 53
End: 2023-01-19
Payer: MEDICAID

## 2023-01-19 VITALS
DIASTOLIC BLOOD PRESSURE: 89 MMHG | SYSTOLIC BLOOD PRESSURE: 146 MMHG | WEIGHT: 129.44 LBS | OXYGEN SATURATION: 97 % | HEART RATE: 92 BPM | BODY MASS INDEX: 26.09 KG/M2 | HEIGHT: 59 IN

## 2023-01-19 DIAGNOSIS — R23.2 HOT FLASHES RELATED TO AROMATASE INHIBITOR THERAPY: ICD-10-CM

## 2023-01-19 DIAGNOSIS — T45.1X5A HOT FLASHES RELATED TO AROMATASE INHIBITOR THERAPY: ICD-10-CM

## 2023-01-19 DIAGNOSIS — M81.0 OSTEOPOROSIS WITHOUT CURRENT PATHOLOGICAL FRACTURE, UNSPECIFIED OSTEOPOROSIS TYPE: ICD-10-CM

## 2023-01-19 DIAGNOSIS — Z79.811 LONG TERM (CURRENT) USE OF AROMATASE INHIBITORS: ICD-10-CM

## 2023-01-19 DIAGNOSIS — D05.12 DUCTAL CARCINOMA IN SITU (DCIS) OF LEFT BREAST: Primary | ICD-10-CM

## 2023-01-19 PROCEDURE — 3077F PR MOST RECENT SYSTOLIC BLOOD PRESSURE >= 140 MM HG: ICD-10-PCS | Mod: CPTII,,, | Performed by: INTERNAL MEDICINE

## 2023-01-19 PROCEDURE — 99214 OFFICE O/P EST MOD 30 MIN: CPT | Mod: S$PBB,,, | Performed by: INTERNAL MEDICINE

## 2023-01-19 PROCEDURE — 99999 PR PBB SHADOW E&M-EST. PATIENT-LVL II: ICD-10-PCS | Mod: PBBFAC,,, | Performed by: INTERNAL MEDICINE

## 2023-01-19 PROCEDURE — 3008F PR BODY MASS INDEX (BMI) DOCUMENTED: ICD-10-PCS | Mod: CPTII,,, | Performed by: INTERNAL MEDICINE

## 2023-01-19 PROCEDURE — 3008F BODY MASS INDEX DOCD: CPT | Mod: CPTII,,, | Performed by: INTERNAL MEDICINE

## 2023-01-19 PROCEDURE — 3079F DIAST BP 80-89 MM HG: CPT | Mod: CPTII,,, | Performed by: INTERNAL MEDICINE

## 2023-01-19 PROCEDURE — 3079F PR MOST RECENT DIASTOLIC BLOOD PRESSURE 80-89 MM HG: ICD-10-PCS | Mod: CPTII,,, | Performed by: INTERNAL MEDICINE

## 2023-01-19 PROCEDURE — 99999 PR PBB SHADOW E&M-EST. PATIENT-LVL II: CPT | Mod: PBBFAC,,, | Performed by: INTERNAL MEDICINE

## 2023-01-19 PROCEDURE — 3077F SYST BP >= 140 MM HG: CPT | Mod: CPTII,,, | Performed by: INTERNAL MEDICINE

## 2023-01-19 PROCEDURE — 99212 OFFICE O/P EST SF 10 MIN: CPT | Mod: PBBFAC | Performed by: INTERNAL MEDICINE

## 2023-01-19 PROCEDURE — 99214 PR OFFICE/OUTPT VISIT, EST, LEVL IV, 30-39 MIN: ICD-10-PCS | Mod: S$PBB,,, | Performed by: INTERNAL MEDICINE

## 2023-01-19 RX ORDER — CLONIDINE HYDROCHLORIDE 0.1 MG/1
0.1 TABLET ORAL 2 TIMES DAILY
Qty: 60 TABLET | Refills: 3 | Status: SHIPPED | OUTPATIENT
Start: 2023-01-19 | End: 2023-08-17

## 2023-01-19 NOTE — PROGRESS NOTES
Subjective:           Patient ID: Lou Jc is a 52 y.o. female.    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 with positive margin s/p re-excison on 11/16/18    S/p adjuvant XRT left breast under direction of Dr. Vázquez at [facility] on 1/25/2019     Arimidex 3/2019- present            Prior Hx: Pt is a  52  y.o. female seen today for follow-up  Left breast cancer. She was initially seen at [facility] ([facility] in [place], [place]). She had a BI-RADS 0 bilateral screening mammogram for architectural distortion and subsequent bilateral diagnostic mammogram and left breast ultrasound that were BI-RADS 4 and revealed focal asymmetry at the outer central position left breast She subsequently underwent ultrasound-guided core needle biopsy on 8/17/18 with pathology showing fragmented papilloma with no evidence of atypia or in-situ or invasive carcinoma. She was then seen by General Surgery at [facility] on 8/31/18 to discuss wire localization excisional biopsy of this intraductal papilloma, which ultimately occurred on 9/21/18. Final pathology from the excision biopsy upstaged to a 2.0 cm grade 2 ER+ (moderate) OK+ (moderate) ductal carcinoma in situ with focally positive medial margin with no evidence of invasive carcinoma. She subsequently underwent re-excison on 11/16/18. Final pathology showed LCIS but no residual DCIS. Margins uninvolved with closest margins are medial and inferior - 1mm, superior 6mm. She routinely performs self breast exams. She has not noted a change on breast exam. Patient denies nipple discharge. She reports she has not undergone annual routine screening mammograms. She reports history of previous breast bx in 2015- pathologically benign. She underwent [facility] testing with was negative . She is followed by Rad/OncShe has completed adjuvant XRT left breast to a dose of 5005cGy under  direction of Dr. Vázquez at [facility] on 1/25/2019 . She started on Arimidex 3/2019.     Interval Hx; She is tearful   She reports family related stressors  Appetite and weight stable  No arthralgias  Mild hot flashes      mammo diagnostic rt and US 6/28/21   BI-RADS Category: Overall: 1 - Negative      [facility], negative [facility] with [facility]      Risk Factors/Breast History Age of Menarche: 10y.o. Age of Menopause: 48. LMP 2016. History of Hormonal Therapy: OCPs x 4 years. No HRT.Nulliparous Family History of Breast Ca: Maternal aunt (age 60s). Maternal aunt (age 70s). Paternal aunt (age 70s).   No Family hx of Ovarian Cancer. Other Family History: Father with pancreatic cancer.         Social History .She is a chronic smoker. She has smoked 1ppd x 25 yrs. She drinks 2 beers/week.          Past Medical History:   Diagnosis Date    Allergy     Breast cancer 2018    Breast cyst     DCIS (ductal carcinoma in situ)     Left    Endometriosis     Papilloma of breast     Reflux          Past Surgical History:   Procedure Laterality Date    BREAST BIOPSY Left     DCIS    BREAST CYST ASPIRATION Left     BREAST LUMPECTOMY Left 2018    2 lumpectomy    diagnostic lap      TONSILLECTOMY         Review of Systems   Constitutional:  Negative for appetite change, fatigue, fever and unexpected weight change.   HENT:  Negative for mouth sores.    Eyes:  Negative for visual disturbance.   Respiratory:  Positive for cough (improved). Negative for shortness of breath.    Cardiovascular:  Negative for chest pain.   Gastrointestinal:  Negative for abdominal pain and diarrhea.   Endocrine:        Mild hot flashes    Genitourinary:  Negative for frequency.   Musculoskeletal:  Negative for back pain.   Skin:  Negative for rash.   Neurological:  Negative for headaches.   Hematological:  Negative for adenopathy.   Psychiatric/Behavioral:  The patient is nervous/anxious.      Objective:          Vitals:    01/19/23 1035  "  BP: (!) 146/89   BP Location: Left arm   Patient Position: Sitting   BP Method: Large (Automatic)   Pulse: 92   SpO2: 97%   Weight: 58.7 kg (129 lb 6.6 oz)   Height: 4' 11" (1.499 m)       Physical Exam   Constitutional: She is oriented to person, place, and time. She appears well-developed and well-nourished.   HENT:   Head: Normocephalic.   Mouth/Throat: Oropharynx is clear and moist. No oropharyngeal exudate.   Eyes: Conjunctivae and lids are normal. Pupils are equal, round, and reactive to light. No scleral icterus.   Neck: Normal range of motion. Neck supple. No thyromegaly present.   Cardiovascular: Normal rate, regular rhythm and normal heart sounds.    No murmur heard.  Pulmonary/Chest: Breath sounds normal. She has no wheezes. She has no rales.   Abdominal: Soft. Bowel sounds are normal. She exhibits no distension and no mass. There is no hepatosplenomegaly. There is no tenderness. There is no rebound and no guarding.   Musculoskeletal: Normal range of motion. She exhibits no edema and no tenderness.   Right breast exhibits tenderness. Right breast exhibits no mass, no nipple discharge and no skin change. Left breast exhibits tenderness. Left breast exhibits no mass, no nipple discharge and no skin change.   Lymphadenopathy:     She has no cervical adenopathy.     She has no axillary adenopathy.        Right: No supraclavicular adenopathy present.        Left: No supraclavicular adenopathy present.   Neurological: She is alert and oriented to person, place, and time. No cranial nerve deficit. Coordination normal.   Skin: Skin is warm and dry. No ecchymosis, no petechiae and no rash noted. No erythema.   Psychiatric: She has a normal mood and affect.           Lab Results   Component Value Date    WBC 7.47 01/06/2023    HGB 12.9 01/06/2023    HCT 38.7 01/06/2023    MCV 90 01/06/2023     01/06/2023     Results for STEFFANIE JIMENEZ (MRN [id number]) as of 1/10/2019 09:20   Ref. Range 12/6/2018 10:27 "   Estradiol Latest Ref Range: See Text pg/mL <10 (A)   Testosterone, Free Latest Units: pg/mL 1.8     FINAL PATHOLOGIC DIAGNOSIS 9/21/2018   Left breast, wire localized lumpectomy:  -Ductal carcinoma in situ (DCIS), Grade 2 (intermediate), focally involving the medial margin, present in 5  blocks with an estimated size of at least 20 mm (see cancer case summary below)  -Background breast tissue with fibrocystic change including dense fibrosis, papillomatosis, adenosis, cystic  dilatation, apocrine metaplasia, and fibroadenomatoid nodules  -Biopsy site changes  -No evidence of invasive carcinoma  Surgical pathology cancer case summary:  -Procedure: Excision (less than total mastectomy)  -Specimen laterality: Left  -Size (extent) of DCIS: Estimated size of DCIS: At least 20 mm: Number blocks with DCIS: 5  -Histologic type: Ductal carcinoma in situ  -Architectural patterns: Solid  -Nuclear grade: Grade 2 (intermediate)  -Necrosis: Not identified  -Margins: Medial margin focally positive for DCIS  -Regional lymph nodes: No lymph nodes submitted or found  -Pathologic stage classification (pTNM):  Primary tumor (pT): pTis (DCIS): Ductal carcinoma in situ  Regional lymph nodes (pN): pNX: Regional lymph nodes cannot be assessed  -Microcalcifications: Present in DCIS and nonneoplastic tissue     Hormone receptor results (performed with appropriate controls):  ER - Positive (moderate)  NV - Positive (moderate)           FINAL PATHOLOGIC DIAGNOSIS  11/16/2018    1. LEFT BREAST, RE-EXCISION:  No residual ductal carcinoma in-situ  Lobular carcinoma in-situ  Margins negative for neoplasia or malignancy (closest margins are medial and inferior - 1mm, superior 6mm)  Atypical lobular hyperplasia  Intraductal papilloma  Negative for malignancy  Extensive previous procedure related changes present  (specimen has been submitted in it's entirety)  2. NEW ANTERIOR MARGIN, EXCISION:  Benign skin and subcutaneous tissue with extensive  inflammation and procedure related changes  Negative for neoplasia or malignancy  (specimen has been submitted in its entirety)  Part1.  Immunostain E-Cadherin has been performed (with appropriate controls) on two sections #1C AND 1G and is  negative, supporting the above diagnosis.  Hormone receptors performed for LCIS (Part 1)  ER - Positive (90% of the tumor nuclei, moderate to strong)  W[place] - Positive (30% of the tumor nuclei, weak)  HER2 - Indeterminate (2+), specimen will be sent out for FISH analysis  Ki67 - 5%, positive tumor nuclei  MYN0XLA,ER,PGR        Bone Density  1/18/2018   Osteopenia.  Ten year probability of major osteoporotic fracture is 3.9%, and hip fracture is 0.2%.      mammo screening 6/15/21   Impression:  Right  Architectural Distortion: Right breast architectural distortion at the upper position. Assessment: 0 - Incomplete.      Left  There is no mammographic evidence of malignancy in the left breast.     BI-RADS Category:   Overall: 0 - Incomplete: Needs Additional Imaging Evaluation     Recommendation:  Diagnostic mammogram with possible ultrasound (if indicated) is recommended.        Results for STEFFANIE JIMENEZ (MRN [id number]) as of 10/18/2021 15:16   Ref. Range 10/15/2021 09:19   Vit D, 25-Hydroxy Latest Ref Range: 30 - 96 ng/mL 62         Mammo 10/2022  BI-RADS Category 1: Negative    Assessment:       1. Ductal carcinoma in situ (DCIS) of left breast    2. Long term (current) use of aromatase inhibitors    3. Hot flashes related to aromatase inhibitor therapy    4. Osteoporosis without current pathological fracture, unspecified osteoporosis type            Plan:       1.,2.  Patient is a 50 y.o. female with Stage 0 pTis N0 M0 grade 2 ER + [place] + DCIS of the left breast status post left partial mastectomy with focally positive medial margin s/p re-excison on 11/16/18. Final pathology showed LCIS but no residual DCIS with negative with closest margins are medial and inferior - 1mm   S/p   adjuvant XRT  under the direction of Rad/Onc at [facility] completed 1/25/2019   [facility], negative Integrated [facility] with [facility]   Cont with Arimidex    She will continue with monthly self-breast exams    Follow-up screening Mammo  BI RADS 0 6/15/21 -further eval   Follow-up Diagnostic Mammo and US mammo diagnostic rt and US 6/28/21 BI-RADS Category: Overall: 1 - Negative  Cont  Clonidine 0.1mg po bid prn hot flashes   Follow-up Mammo 10/2022 BI-RADS Category 1: Negative      3 Cont  Clonidine 0.1mg po bid prn hot flashes   4. . Bone density 2022 recent reveals osteoporosis  Cont Ca and Vit D   Fosamax Rx refill  Continue weight bearing exercises  Avoidance of smoking    She will follow-up in 6  Mos with cbc,cmp, Vit D  prior to f/u     .    Advance Care Planning     Power of   I previously  initiated the process of advance care planning today and explained the importance of this process to the patient.  I introduced the concept of advance directives to the patient, as well. Then the patient received detailed information about the importance of designating a Health Care Power of  (HCPOA). She was also instructed to communicate with this person about their wishes for future healthcare, should she become sick and lose decision-making capacity. The patient has not previously appointed a HCPOA. After our discussion, the patient has decided to complete a HCPOA and has appointed her brother  Xiang Trinidad [phone] and NAME:  Leon Lay [phone]  I spent a total time of 16  minutes discussing this issue with the patient.    Advance Care Planning     Living Will  During previous visit, I engaged the patient in the advance care planning process.  The patient and I reviewed the role for advance directives and their purpose in directing future healthcare if the patient's unable to speak for him/herself.  At this point in time, the patient does have full decision-making  capacity.  We discussed different extreme health states that she could experience, and reviewed what kind of medical care she would want in those situations.  The patient communicated that if she were comatose and had little chance of a meaningful recovery, she would not want machines/life-sustaining treatments used.    The patient has completed a living will to reflect these preferences.  The patient  has already designated a healthcare power of  to make decisions on her behalf.   I spent a total of  16  minutes engaging the patient in this advance care planning discussion.           Cc: MD Maryjo Pagan MD

## 2023-02-20 ENCOUNTER — HOSPITAL ENCOUNTER (EMERGENCY)
Facility: HOSPITAL | Age: 53
Discharge: HOME | End: 2023-02-20
Attending: EMERGENCY MEDICINE
Payer: MEDICAID

## 2023-02-20 VITALS
HEART RATE: 69 BPM | SYSTOLIC BLOOD PRESSURE: 160 MMHG | WEIGHT: 127 LBS | RESPIRATION RATE: 16 BRPM | BODY MASS INDEX: 25.6 KG/M2 | OXYGEN SATURATION: 97 % | HEIGHT: 59 IN | DIASTOLIC BLOOD PRESSURE: 83 MMHG | TEMPERATURE: 98 F

## 2023-02-20 DIAGNOSIS — S46.812A SUPRASPINATUS SPRAIN, LEFT, INITIAL ENCOUNTER: Primary | ICD-10-CM

## 2023-02-20 PROCEDURE — 99284 EMERGENCY DEPT VISIT MOD MDM: CPT

## 2023-02-20 PROCEDURE — 96372 THER/PROPH/DIAG INJ SC/IM: CPT | Performed by: PHYSICIAN ASSISTANT

## 2023-02-20 PROCEDURE — 63600175 PHARM REV CODE 636 W HCPCS: Performed by: PHYSICIAN ASSISTANT

## 2023-02-20 RX ORDER — KETOROLAC TROMETHAMINE 30 MG/ML
30 INJECTION, SOLUTION INTRAMUSCULAR; INTRAVENOUS
Status: COMPLETED | OUTPATIENT
Start: 2023-02-20 | End: 2023-02-20

## 2023-02-20 RX ORDER — MELOXICAM 7.5 MG/1
7.5 TABLET ORAL DAILY
Qty: 12 TABLET | Refills: 0 | Status: SHIPPED | OUTPATIENT
Start: 2023-02-20 | End: 2023-02-28 | Stop reason: SDUPTHER

## 2023-02-20 RX ORDER — CYCLOBENZAPRINE HCL 10 MG
10 TABLET ORAL 3 TIMES DAILY PRN
Qty: 12 TABLET | Refills: 0 | Status: SHIPPED | OUTPATIENT
Start: 2023-02-20 | End: 2023-02-28 | Stop reason: SDUPTHER

## 2023-02-20 RX ADMIN — KETOROLAC TROMETHAMINE 30 MG: 30 INJECTION, SOLUTION INTRAMUSCULAR; INTRAVENOUS at 12:02

## 2023-02-20 NOTE — DISCHARGE INSTRUCTIONS

## 2023-02-20 NOTE — ED TRIAGE NOTES
Shoulder Pain (Pt c/o left shoulder pain which presented 4 days ago. Pt denies heavy lifting, strenuous activity, or trauma. Pt states she took tylenol this morning with some relief )

## 2023-02-20 NOTE — ED PROVIDER NOTES
Encounter Date: 2/20/2023    SCRIBE #1 NOTE: IFatou, am scribing for, and in the presence of,  Paul Plascencia PA-C. I have scribed the following portions of the note - Other sections scribed: HPI, ROS.     History     Chief Complaint   Patient presents with    Shoulder Pain     Pt c/o left shoulder pain which presented 4 days ago. Pt denies heavy lifting, strenuous activity, or trauma. Pt states she took tylenol this morning with some relief      This 52 y.o female, with a medical history of Breast cancer, Endometriosis, and Reflux, presents to the ED c/o left shoulder pain for the last 4 days. Pt reports that she initially began to experience pain to the left shoulder while in the store on Friday. She states that she awoke the following morning with increased pain and took OTC Tylenol for treatment with only temporary improvement. Pt notes that she has also taken Tylenol #3, Ultram, Ibuprofen and Icy Hot for treatment, however, the pain has progressively worsened and is persisting. She states that the pain is exacerbated with movement, noting that she is able to lift her left arm up completely. The symptoms are acute, constant and severe (8/10). She is right hand dominant. No previous episodes of similar symptoms. No recent heavy lifting or unordinary movements. She denies shortness of breath, chest pain, or any other associated symptoms.     The history is provided by the patient.   Review of patient's allergies indicates:  No Known Allergies  Past Medical History:   Diagnosis Date    Allergy     Breast cancer 2018    Breast cyst     DCIS (ductal carcinoma in situ)     Left    Endometriosis     Papilloma of breast     Reflux      Past Surgical History:   Procedure Laterality Date    BREAST BIOPSY Left     DCIS    BREAST CYST ASPIRATION Left     BREAST LUMPECTOMY Left 2018    2 lumpectomy    diagnostic lap      TONSILLECTOMY       Family History   Problem Relation Age of Onset    Diabetes Mother      Heart disease Mother     COPD Mother     Hypertension Mother     Kidney disease Mother     Cancer Mother     Throat cancer Mother     Depression Mother     Hearing loss Mother     Heart failure Mother     Pacemaker/defibrilator Mother     Heart disease Father     Cancer Father         Pancreatic    Diabetes Father     Bone cancer Father     Pacemaker/defibrilator Father     Breast cancer Maternal Aunt     Breast cancer Paternal Aunt     Breast cancer Maternal Aunt     Heart attack Maternal Grandfather     Pacemaker/defibrilator Maternal Grandfather     Colon cancer Neg Hx     Ovarian cancer Neg Hx      Social History     Tobacco Use    Smoking status: Former     Packs/day: 0.50     Years: 20.00     Pack years: 10.00     Types: Cigarettes     Quit date: 2017     Years since quittin.4    Smokeless tobacco: Never   Substance Use Topics    Alcohol use: Yes     Alcohol/week: 1.0 standard drink     Types: 1 Cans of beer per week     Comment: weekly    Drug use: Not Currently     Review of Systems   Constitutional:  Negative for fever.   HENT:  Negative for sore throat.    Respiratory:  Negative for shortness of breath.    Cardiovascular:  Negative for chest pain.   Gastrointestinal:  Negative for nausea.   Genitourinary:  Negative for dysuria.   Musculoskeletal:  Positive for arthralgias (left shoulder pain). Negative for back pain.   Skin:  Negative for rash.   Neurological:  Negative for weakness.     Physical Exam     Initial Vitals [23 1141]   BP Pulse Resp Temp SpO2   (!) 157/76 88 17 98.3 °F (36.8 °C) 100 %      MAP       --         Physical Exam    Nursing note and vitals reviewed.  Constitutional: She appears well-developed and well-nourished. She is not diaphoretic. No distress.   HENT:   Head: Atraumatic.   Right Ear: External ear normal.   Left Ear: External ear normal.   Eyes: Conjunctivae and EOM are normal.   Neck: No tracheal deviation present.   Normal range of motion.  Cardiovascular:   Normal rate and regular rhythm.           Pulmonary/Chest: No accessory muscle usage or stridor. No tachypnea. No respiratory distress.   Musculoskeletal:         General: No edema. Normal range of motion.      Cervical back: Normal range of motion.      Comments: Pinpoint tenderness over the left supraspinatus musculature that is also reproducible with movement.  No bony tenderness to the left shoulder region or C-spine tenderness.  No overlying skin changes.  Slightly reduced ROM of left shoulder secondary to pain of the left supraspinatus muscle.  Radial pulses 2+ and equal.     Neurological: She is alert and oriented to person, place, and time. She displays no tremor. She displays no seizure activity. Coordination and gait normal.   Skin: Skin is intact. Capillary refill takes less than 2 seconds. No rash noted. No erythema.       ED Course   Procedures  Labs Reviewed - No data to display       Imaging Results    None          Medications   ketorolac injection 30 mg (30 mg Intramuscular Given 2/20/23 1258)     Medical Decision Making:   History:   Old Medical Records: I decided to obtain old medical records.  ED Management:  Myofascial spasm.  No history of trauma.  No convincing evidence for septic joint.  No vascular compromise.  No overlying shingles or cellulitis.  No radiculopathy.  I shared decision making with patient for screening for potential occult cardiac process; declines.        Scribe Attestation:   Scribe #1: I performed the above scribed service and the documentation accurately describes the services I performed. I attest to the accuracy of the note.                 I, Paul Plascencia PA-C, personally performed the services described in this documentation. All medical record entries made by the scribe were at my direction and in my presence. I have reviewed the chart and agree that the record reflects my personal performance and is accurate and complete.   Clinical Impression:   Final  diagnoses:  [S43.130A] Supraspinatus sprain, left, initial encounter (Primary)        ED Disposition Condition    Discharge Stable          ED Prescriptions       Medication Sig Dispense Start Date End Date Auth. Provider    cyclobenzaprine (FLEXERIL) 10 MG tablet Take 1 tablet (10 mg total) by mouth 3 (three) times daily as needed for Muscle spasms. 12 tablet 2/20/2023 3/2/2023 Paul Plascencia PA-C    meloxicam (MOBIC) 7.5 MG tablet Take 1 tablet (7.5 mg total) by mouth once daily. 12 tablet 2/20/2023 -- Paul Plascencia PA-C          Follow-up Information       Follow up With Specialties Details Why Contact Info    Parth Wolfe MD Family Medicine Schedule an appointment as soon as possible for a visit in 1 day For re-evaluation [address] [facility] [address] [phone]      [facility] - Emergency Dept Emergency Medicine Go to  If symptoms worsen [address]  [facility] [address] [phone]             Paul Plascencia PA-C  02/20/23 8622

## 2023-03-18 DIAGNOSIS — M81.0 OSTEOPOROSIS WITHOUT CURRENT PATHOLOGICAL FRACTURE, UNSPECIFIED OSTEOPOROSIS TYPE: ICD-10-CM

## 2023-03-20 RX ORDER — ALENDRONATE SODIUM 70 MG/1
TABLET ORAL
Qty: 4 TABLET | Refills: 0 | OUTPATIENT
Start: 2023-03-20

## 2023-04-03 ENCOUNTER — LAB VISIT (OUTPATIENT)
Dept: LAB | Facility: HOSPITAL | Age: 53
End: 2023-04-03
Attending: NURSE PRACTITIONER
Payer: MEDICAID

## 2023-04-03 DIAGNOSIS — Z85.3 PERSONAL HISTORY OF BREAST CANCER: ICD-10-CM

## 2023-04-03 DIAGNOSIS — Z91.89 AT HIGH RISK FOR BREAST CANCER: ICD-10-CM

## 2023-04-03 DIAGNOSIS — D05.12 DUCTAL CARCINOMA IN SITU (DCIS) OF LEFT BREAST: ICD-10-CM

## 2023-04-03 LAB
CREAT SERPL-MCNC: 0.8 MG/DL (ref 0.5–1.4)
EST. GFR  (NO RACE VARIABLE): >60 ML/MIN/1.73 M^2

## 2023-04-03 PROCEDURE — 82565 ASSAY OF CREATININE: CPT | Performed by: NURSE PRACTITIONER

## 2023-04-03 PROCEDURE — 36415 COLL VENOUS BLD VENIPUNCTURE: CPT | Performed by: NURSE PRACTITIONER

## 2023-04-03 NOTE — TELEPHONE ENCOUNTER
Patient called for a refill on anastrozole. Medication was last filled on 6/16/22. Patient was last seen on 1/19/23 and has a 6months recall in.

## 2023-04-04 RX ORDER — ANASTROZOLE 1 MG/1
1 TABLET ORAL DAILY
Qty: 30 TABLET | Refills: 6 | Status: SHIPPED | OUTPATIENT
Start: 2023-04-04 | End: 2024-01-05

## 2023-04-05 ENCOUNTER — HOSPITAL ENCOUNTER (OUTPATIENT)
Dept: RADIOLOGY | Facility: HOSPITAL | Age: 53
Discharge: HOME | End: 2023-04-05
Attending: NURSE PRACTITIONER
Payer: MEDICAID

## 2023-04-05 DIAGNOSIS — Z85.3 PERSONAL HISTORY OF BREAST CANCER: ICD-10-CM

## 2023-04-05 DIAGNOSIS — Z91.89 AT HIGH RISK FOR BREAST CANCER: ICD-10-CM

## 2023-04-05 PROCEDURE — A9577 INJ MULTIHANCE: HCPCS | Performed by: NURSE PRACTITIONER

## 2023-04-05 PROCEDURE — 25500020 PHARM REV CODE 255: Performed by: NURSE PRACTITIONER

## 2023-04-05 PROCEDURE — 77049 MRI BREAST C-+ W/CAD BI: CPT | Mod: TC

## 2023-04-05 PROCEDURE — 77049 MRI BREAST W/WO CONTRAST, W/CAD, BILATERAL: ICD-10-PCS | Mod: 26,,, | Performed by: RADIOLOGY

## 2023-04-05 PROCEDURE — 77049 MRI BREAST C-+ W/CAD BI: CPT | Mod: 26,,, | Performed by: RADIOLOGY

## 2023-04-05 RX ADMIN — GADOBENATE DIMEGLUMINE 13 ML: 529 INJECTION, SOLUTION INTRAVENOUS at 11:04

## 2023-04-13 ENCOUNTER — TELEPHONE (OUTPATIENT)
Dept: SURGERY | Facility: CLINIC | Age: 53
End: 2023-04-13
Payer: MEDICAID

## 2023-04-14 ENCOUNTER — TELEPHONE (OUTPATIENT)
Dept: SURGERY | Facility: CLINIC | Age: 53
End: 2023-04-14
Payer: MEDICAID

## 2023-06-13 DIAGNOSIS — M81.0 OSTEOPOROSIS WITHOUT CURRENT PATHOLOGICAL FRACTURE, UNSPECIFIED OSTEOPOROSIS TYPE: ICD-10-CM

## 2023-06-13 RX ORDER — ALENDRONATE SODIUM 70 MG/1
TABLET ORAL
Qty: 4 TABLET | Refills: 0 | Status: SHIPPED | OUTPATIENT
Start: 2023-06-13 | End: 2023-07-10

## 2023-08-29 ENCOUNTER — LAB VISIT (OUTPATIENT)
Dept: LAB | Facility: HOSPITAL | Age: 53
End: 2023-08-29
Attending: INTERNAL MEDICINE
Payer: MEDICAID

## 2023-08-29 DIAGNOSIS — D05.12 DUCTAL CARCINOMA IN SITU (DCIS) OF LEFT BREAST: ICD-10-CM

## 2023-08-29 DIAGNOSIS — M81.0 OSTEOPOROSIS WITHOUT CURRENT PATHOLOGICAL FRACTURE, UNSPECIFIED OSTEOPOROSIS TYPE: ICD-10-CM

## 2023-08-29 LAB
25(OH)D3+25(OH)D2 SERPL-MCNC: 40 NG/ML (ref 30–96)
ALBUMIN SERPL BCP-MCNC: 4.2 G/DL (ref 3.5–5.2)
ALP SERPL-CCNC: 95 U/L (ref 55–135)
ALT SERPL W/O P-5'-P-CCNC: 26 U/L (ref 10–44)
ANION GAP SERPL CALC-SCNC: 9 MMOL/L (ref 8–16)
AST SERPL-CCNC: 20 U/L (ref 10–40)
BASOPHILS # BLD AUTO: 0.04 K/UL (ref 0–0.2)
BASOPHILS NFR BLD: 0.5 % (ref 0–1.9)
BILIRUB SERPL-MCNC: 0.3 MG/DL (ref 0.1–1)
BUN SERPL-MCNC: 14 MG/DL (ref 6–20)
CALCIUM SERPL-MCNC: 10.4 MG/DL (ref 8.7–10.5)
CHLORIDE SERPL-SCNC: 104 MMOL/L (ref 95–110)
CO2 SERPL-SCNC: 28 MMOL/L (ref 23–29)
CREAT SERPL-MCNC: 0.8 MG/DL (ref 0.5–1.4)
DIFFERENTIAL METHOD: NORMAL
EOSINOPHIL # BLD AUTO: 0.2 K/UL (ref 0–0.5)
EOSINOPHIL NFR BLD: 2.3 % (ref 0–8)
ERYTHROCYTE [DISTWIDTH] IN BLOOD BY AUTOMATED COUNT: 13.4 % (ref 11.5–14.5)
EST. GFR  (NO RACE VARIABLE): >60 ML/MIN/1.73 M^2
GLUCOSE SERPL-MCNC: 82 MG/DL (ref 70–110)
HCT VFR BLD AUTO: 41.9 % (ref 37–48.5)
HGB BLD-MCNC: 13.6 G/DL (ref 12–16)
IMM GRANULOCYTES # BLD AUTO: 0.02 K/UL (ref 0–0.04)
IMM GRANULOCYTES NFR BLD AUTO: 0.3 % (ref 0–0.5)
LYMPHOCYTES # BLD AUTO: 2.2 K/UL (ref 1–4.8)
LYMPHOCYTES NFR BLD: 29.7 % (ref 18–48)
MCH RBC QN AUTO: 29.4 PG (ref 27–31)
MCHC RBC AUTO-ENTMCNC: 32.5 G/DL (ref 32–36)
MCV RBC AUTO: 91 FL (ref 82–98)
MONOCYTES # BLD AUTO: 0.5 K/UL (ref 0.3–1)
MONOCYTES NFR BLD: 7 % (ref 4–15)
NEUTROPHILS # BLD AUTO: 4.5 K/UL (ref 1.8–7.7)
NEUTROPHILS NFR BLD: 60.2 % (ref 38–73)
NRBC BLD-RTO: 0 /100 WBC
PLATELET # BLD AUTO: 254 K/UL (ref 150–450)
PMV BLD AUTO: 10.7 FL (ref 9.2–12.9)
POTASSIUM SERPL-SCNC: 4.1 MMOL/L (ref 3.5–5.1)
PROT SERPL-MCNC: 7 G/DL (ref 6–8.4)
RBC # BLD AUTO: 4.63 M/UL (ref 4–5.4)
SODIUM SERPL-SCNC: 141 MMOL/L (ref 136–145)
WBC # BLD AUTO: 7.53 K/UL (ref 3.9–12.7)

## 2023-08-29 PROCEDURE — 80053 COMPREHEN METABOLIC PANEL: CPT | Performed by: INTERNAL MEDICINE

## 2023-08-29 PROCEDURE — 36415 COLL VENOUS BLD VENIPUNCTURE: CPT | Performed by: INTERNAL MEDICINE

## 2023-08-29 PROCEDURE — 82306 VITAMIN D 25 HYDROXY: CPT | Performed by: INTERNAL MEDICINE

## 2023-08-29 PROCEDURE — 85025 COMPLETE CBC W/AUTO DIFF WBC: CPT | Performed by: INTERNAL MEDICINE

## 2023-08-31 ENCOUNTER — OFFICE VISIT (OUTPATIENT)
Dept: HEMATOLOGY/ONCOLOGY | Facility: CLINIC | Age: 53
End: 2023-08-31
Payer: MEDICAID

## 2023-08-31 VITALS
BODY MASS INDEX: 25.52 KG/M2 | DIASTOLIC BLOOD PRESSURE: 82 MMHG | HEIGHT: 59 IN | HEART RATE: 73 BPM | SYSTOLIC BLOOD PRESSURE: 123 MMHG | WEIGHT: 126.56 LBS | OXYGEN SATURATION: 98 %

## 2023-08-31 DIAGNOSIS — T45.1X5A HOT FLASHES RELATED TO AROMATASE INHIBITOR THERAPY: ICD-10-CM

## 2023-08-31 DIAGNOSIS — D05.12 DUCTAL CARCINOMA IN SITU (DCIS) OF LEFT BREAST: Primary | ICD-10-CM

## 2023-08-31 DIAGNOSIS — R23.2 HOT FLASHES RELATED TO AROMATASE INHIBITOR THERAPY: ICD-10-CM

## 2023-08-31 DIAGNOSIS — Z79.811 LONG TERM (CURRENT) USE OF AROMATASE INHIBITORS: ICD-10-CM

## 2023-08-31 DIAGNOSIS — M81.0 OSTEOPOROSIS WITHOUT CURRENT PATHOLOGICAL FRACTURE, UNSPECIFIED OSTEOPOROSIS TYPE: ICD-10-CM

## 2023-08-31 PROCEDURE — 3074F SYST BP LT 130 MM HG: CPT | Mod: CPTII,,, | Performed by: INTERNAL MEDICINE

## 2023-08-31 PROCEDURE — 99213 OFFICE O/P EST LOW 20 MIN: CPT | Mod: PBBFAC | Performed by: INTERNAL MEDICINE

## 2023-08-31 PROCEDURE — 3079F PR MOST RECENT DIASTOLIC BLOOD PRESSURE 80-89 MM HG: ICD-10-PCS | Mod: CPTII,,, | Performed by: INTERNAL MEDICINE

## 2023-08-31 PROCEDURE — 3008F PR BODY MASS INDEX (BMI) DOCUMENTED: ICD-10-PCS | Mod: CPTII,,, | Performed by: INTERNAL MEDICINE

## 2023-08-31 PROCEDURE — 99999 PR PBB SHADOW E&M-EST. PATIENT-LVL III: CPT | Mod: PBBFAC,,, | Performed by: INTERNAL MEDICINE

## 2023-08-31 PROCEDURE — 3008F BODY MASS INDEX DOCD: CPT | Mod: CPTII,,, | Performed by: INTERNAL MEDICINE

## 2023-08-31 PROCEDURE — 99999 PR PBB SHADOW E&M-EST. PATIENT-LVL III: ICD-10-PCS | Mod: PBBFAC,,, | Performed by: INTERNAL MEDICINE

## 2023-08-31 PROCEDURE — 99214 PR OFFICE/OUTPT VISIT, EST, LEVL IV, 30-39 MIN: ICD-10-PCS | Mod: S$PBB,,, | Performed by: INTERNAL MEDICINE

## 2023-08-31 PROCEDURE — 99214 OFFICE O/P EST MOD 30 MIN: CPT | Mod: S$PBB,,, | Performed by: INTERNAL MEDICINE

## 2023-08-31 PROCEDURE — 3079F DIAST BP 80-89 MM HG: CPT | Mod: CPTII,,, | Performed by: INTERNAL MEDICINE

## 2023-08-31 PROCEDURE — 3074F PR MOST RECENT SYSTOLIC BLOOD PRESSURE < 130 MM HG: ICD-10-PCS | Mod: CPTII,,, | Performed by: INTERNAL MEDICINE

## 2023-08-31 RX ORDER — ALENDRONATE SODIUM 70 MG/1
70 TABLET ORAL
Qty: 4 TABLET | Refills: 1 | Status: SHIPPED | OUTPATIENT
Start: 2023-08-31 | End: 2023-10-15

## 2023-08-31 RX ORDER — ERGOCALCIFEROL 1.25 MG/1
50000 CAPSULE ORAL DAILY
Qty: 120 CAPSULE | Refills: 2 | Status: CANCELLED | OUTPATIENT
Start: 2023-08-31 | End: 2024-08-30

## 2023-08-31 NOTE — PROGRESS NOTES
Subjective:           Patient ID: Lou Jc is a 53 y.o. female.    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 with positive margin s/p re-excison on 11/16/18    S/p adjuvant XRT left breast under direction of Dr. Vázquez at [facility] on 1/25/2019     Arimidex 3/2019- present            Prior Hx: Pt is a  52  y.o. female seen today for follow-up  Left breast cancer. She was initially seen at [facility] ([facility] in [place], [place]). She had a BI-RADS 0 bilateral screening mammogram for architectural distortion and subsequent bilateral diagnostic mammogram and left breast ultrasound that were BI-RADS 4 and revealed focal asymmetry at the outer central position left breast She subsequently underwent ultrasound-guided core needle biopsy on 8/17/18 with pathology showing fragmented papilloma with no evidence of atypia or in-situ or invasive carcinoma. She was then seen by General Surgery at [facility] on 8/31/18 to discuss wire localization excisional biopsy of this intraductal papilloma, which ultimately occurred on 9/21/18. Final pathology from the excision biopsy upstaged to a 2.0 cm grade 2 ER+ (moderate) MD+ (moderate) ductal carcinoma in situ with focally positive medial margin with no evidence of invasive carcinoma. She subsequently underwent re-excison on 11/16/18. Final pathology showed LCIS but no residual DCIS. Margins uninvolved with closest margins are medial and inferior - 1mm, superior 6mm. She routinely performs self breast exams. She has not noted a change on breast exam. Patient denies nipple discharge. She reports she has not undergone annual routine screening mammograms. She reports history of previous breast bx in 2015- pathologically benign. She underwent [facility] testing with was negative . She is followed by Rad/OncShe has completed adjuvant XRT left breast to a dose of 5005cGy under  direction of Dr. Vázquez at [facility] on 1/25/2019 . She started on Arimidex 3/2019.     Interval Hx; No new issues  She reports family related stressors  Appetite and weight stable  No SOB/CP    mammo diagnostic rt and US 6/28/21   BI-RADS Category: Overall: 1 - Negative      [facility], negative Integrated [facility] with [facility]      Risk Factors/Breast History Age of Menarche: 10y.o. Age of Menopause: 48. LMP 2016. History of Hormonal Therapy: OCPs x 4 years. No HRT.Nulliparous Family History of Breast Ca: Maternal aunt (age 60s). Maternal aunt (age 70s). Paternal aunt (age 70s).   No Family hx of Ovarian Cancer. Other Family History: Father with pancreatic cancer.         Social History .She is a chronic smoker. She has smoked 1ppd x 25 yrs. She drinks 2 beers/week.          Past Medical History:   Diagnosis Date    Allergy     Breast cancer 2018    Breast cyst     DCIS (ductal carcinoma in situ)     Left    Endometriosis     Papilloma of breast     Reflux          Past Surgical History:   Procedure Laterality Date    BREAST BIOPSY Left     DCIS    BREAST CYST ASPIRATION Left     BREAST LUMPECTOMY Left 2018    2 lumpectomy    diagnostic lap      TONSILLECTOMY         Review of Systems   Constitutional:  Negative for appetite change, fatigue, fever and unexpected weight change.   HENT:  Negative for mouth sores.    Eyes:  Negative for visual disturbance.   Respiratory:  Negative for cough and shortness of breath.    Cardiovascular:  Negative for chest pain.   Gastrointestinal:  Negative for abdominal pain and diarrhea.   Endocrine:        Mild hot flashes    Genitourinary:  Negative for frequency.   Musculoskeletal:  Negative for back pain.   Skin:  Negative for rash.   Neurological:  Negative for headaches.   Hematological:  Negative for adenopathy.   Psychiatric/Behavioral:  The patient is nervous/anxious.        Objective:          Vitals:    08/31/23 1305   BP: 123/82   Pulse: 73   SpO2: 98%   Weight:  "57.4 kg (126 lb 8.7 oz)   Height: 4' 11" (1.499 m)       Physical Exam   Constitutional: She is oriented to person, place, and time. She appears well-developed and well-nourished.   HENT:   Head: Normocephalic.   Mouth/Throat: Oropharynx is clear and moist. No oropharyngeal exudate.   Eyes: Conjunctivae and lids are normal. Pupils are equal, round, and reactive to light. No scleral icterus.   Neck: Normal range of motion. Neck supple. No thyromegaly present.   Cardiovascular: Normal rate, regular rhythm and normal heart sounds.    No murmur heard.  Pulmonary/Chest: Breath sounds normal. She has no wheezes. She has no rales.   Abdominal: Soft. Bowel sounds are normal. She exhibits no distension and no mass. There is no hepatosplenomegaly. There is no tenderness. There is no rebound and no guarding.   Musculoskeletal: Normal range of motion. She exhibits no edema and no tenderness.   Right breast exhibits tenderness. Right breast exhibits no mass, no nipple discharge and no skin change. Left breast exhibits tenderness. Left breast exhibits no mass, no nipple discharge and no skin change.   Lymphadenopathy:     She has no cervical adenopathy.     She has no axillary adenopathy.        Right: No supraclavicular adenopathy present.        Left: No supraclavicular adenopathy present.   Neurological: She is alert and oriented to person, place, and time. No cranial nerve deficit. Coordination normal.   Skin: Skin is warm and dry. No ecchymosis, no petechiae and no rash noted. No erythema.   Psychiatric: She has a normal mood and affect.           Lab Results   Component Value Date    WBC 7.53 08/29/2023    HGB 13.6 08/29/2023    HCT 41.9 08/29/2023    MCV 91 08/29/2023     08/29/2023     Results for STEFFANIE JIMENEZ (MRN [id number]) as of 1/10/2019 09:20   Ref. Range 12/6/2018 10:27   Estradiol Latest Ref Range: See Text pg/mL <10 (A)   Testosterone, Free Latest Units: pg/mL 1.8     FINAL PATHOLOGIC DIAGNOSIS " 9/21/2018   Left breast, wire localized lumpectomy:  -Ductal carcinoma in situ (DCIS), Grade 2 (intermediate), focally involving the medial margin, present in 5  blocks with an estimated size of at least 20 mm (see cancer case summary below)  -Background breast tissue with fibrocystic change including dense fibrosis, papillomatosis, adenosis, cystic  dilatation, apocrine metaplasia, and fibroadenomatoid nodules  -Biopsy site changes  -No evidence of invasive carcinoma  Surgical pathology cancer case summary:  -Procedure: Excision (less than total mastectomy)  -Specimen laterality: Left  -Size (extent) of DCIS: Estimated size of DCIS: At least 20 mm: Number blocks with DCIS: 5  -Histologic type: Ductal carcinoma in situ  -Architectural patterns: Solid  -Nuclear grade: Grade 2 (intermediate)  -Necrosis: Not identified  -Margins: Medial margin focally positive for DCIS  -Regional lymph nodes: No lymph nodes submitted or found  -Pathologic stage classification (pTNM):  Primary tumor (pT): pTis (DCIS): Ductal carcinoma in situ  Regional lymph nodes (pN): pNX: Regional lymph nodes cannot be assessed  -Microcalcifications: Present in DCIS and nonneoplastic tissue     Hormone receptor results (performed with appropriate controls):  ER - Positive (moderate)  NV - Positive (moderate)           FINAL PATHOLOGIC DIAGNOSIS  11/16/2018    1. LEFT BREAST, RE-EXCISION:  No residual ductal carcinoma in-situ  Lobular carcinoma in-situ  Margins negative for neoplasia or malignancy (closest margins are medial and inferior - 1mm, superior 6mm)  Atypical lobular hyperplasia  Intraductal papilloma  Negative for malignancy  Extensive previous procedure related changes present  (specimen has been submitted in it's entirety)  2. NEW ANTERIOR MARGIN, EXCISION:  Benign skin and subcutaneous tissue with extensive inflammation and procedure related changes  Negative for neoplasia or malignancy  (specimen has been submitted in its  entirety)  Part1.  Immunostain E-Cadherin has been performed (with appropriate controls) on two sections #1C AND 1G and is  negative, supporting the above diagnosis.  Hormone receptors performed for LCIS (Part 1)  ER - Positive (90% of the tumor nuclei, moderate to strong)  CT - Positive (30% of the tumor nuclei, weak)  HER2 - Indeterminate (2+), specimen will be sent out for FISH analysis  Ki67 - 5%, positive tumor nuclei  [id number],ER,PGR        Bone Density  1/18/2018   Osteopenia.  Ten year probability of major osteoporotic fracture is 3.9%, and hip fracture is 0.2%.      mammo screening 6/15/21   Impression:  Right  Architectural Distortion: Right breast architectural distortion at the upper position. Assessment: 0 - Incomplete.      Left  There is no mammographic evidence of malignancy in the left breast.     BI-RADS Category:   Overall: 0 - Incomplete: Needs Additional Imaging Evaluation     Recommendation:  Diagnostic mammogram with possible ultrasound (if indicated) is recommended.        Results for STEFFANIE JIMENEZ (MRN [id number]) as of 10/18/2021 15:16   Ref. Range 10/15/2021 09:19   Vit D, 25-Hydroxy Latest Ref Range: 30 - 96 ng/mL 62         Mammo 10/2022 BI-RADS Category 1: Negative    Assessment:       1. Ductal carcinoma in situ (DCIS) of left breast    2. Long term (current) use of aromatase inhibitors    3. Hot flashes related to aromatase inhibitor therapy              Plan:       1.,2.  Patient is a 50 y.o. female with Stage 0 pTis N0 M0 grade 2 ER + CT + DCIS of the left breast status post left partial mastectomy with focally positive medial margin s/p re-excison on 11/16/18. Final pathology showed LCIS but no residual DCIS with negative with closest margins are medial and inferior - 1mm   S/p  adjuvant XRT  under the direction of Rad/Onc at [facility] completed 1/25/2019   [facility], negative Integrated [facility]ACAnalysis with [facility]   Cont with Arimidex    She will continue with monthly  self-breast exams    Follow-up screening Mammo  BI RADS 0 6/15/21 -further eval   Follow-up Diagnostic Mammo and US mammo diagnostic rt and US 6/28/21 BI-RADS Category: Overall: 1 - Negative  Cont  Clonidine 0.1mg po bid prn hot flashes   Follow-up Mammo 10/2022 BI-RADS Category 1: Negative    Plan follow up Mammo 10/2023    3 Cont  Clonidine 0.1mg po bid prn hot flashes     4. . Bone density 2022 recent reveals osteoporosis  Cont Ca and Vit D   Fosamax Rx refill provided  Continue weight bearing exercises  Avoidance of smoking    She will follow-up in 6  Mos with cbc,cmp,   prior to f/u     .    Advance Care Planning     Power of   I previously  initiated the process of advance care planning today and explained the importance of this process to the patient.  I introduced the concept of advance directives to the patient, as well. Then the patient received detailed information about the importance of designating a Health Care Power of  (HCPOA). She was also instructed to communicate with this person about their wishes for future healthcare, should she become sick and lose decision-making capacity. The patient has not previously appointed a HCPOA. After our discussion, the patient has decided to complete a HCPOA and has appointed her brother  Xiang Trinidad [phone] and NAME:  Leon Lay [phone]  I spent a total time of 16  minutes discussing this issue with the patient.    Advance Care Planning     Living Will  During previous visit, I engaged the patient in the advance care planning process.  The patient and I reviewed the role for advance directives and their purpose in directing future healthcare if the patient's unable to speak for him/herself.  At this point in time, the patient does have full decision-making capacity.  We discussed different extreme health states that she could experience, and reviewed what kind of medical care she would want in those situations.  The  patient communicated that if she were comatose and had little chance of a meaningful recovery, she would not want machines/life-sustaining treatments used.    The patient has completed a living will to reflect these preferences.  The patient  has already designated a healthcare power of  to make decisions on her behalf.   I spent a total of  16  minutes engaging the patient in this advance care planning discussion.           Cc: MD Maryjo Pagan MD

## 2023-10-13 DIAGNOSIS — M81.0 OSTEOPOROSIS WITHOUT CURRENT PATHOLOGICAL FRACTURE, UNSPECIFIED OSTEOPOROSIS TYPE: ICD-10-CM

## 2023-10-15 RX ORDER — ALENDRONATE SODIUM 70 MG/1
70 TABLET ORAL
Qty: 4 TABLET | Refills: 0 | Status: SHIPPED | OUTPATIENT
Start: 2023-10-15 | End: 2023-12-04

## 2023-10-30 ENCOUNTER — HOSPITAL ENCOUNTER (OUTPATIENT)
Dept: RADIOLOGY | Facility: HOSPITAL | Age: 53
Discharge: HOME | End: 2023-10-30
Attending: INTERNAL MEDICINE
Payer: MEDICAID

## 2023-10-30 DIAGNOSIS — D05.12 DUCTAL CARCINOMA IN SITU (DCIS) OF LEFT BREAST: ICD-10-CM

## 2023-10-30 PROCEDURE — 77067 SCR MAMMO BI INCL CAD: CPT | Mod: TC

## 2023-10-30 PROCEDURE — 77067 SCR MAMMO BI INCL CAD: CPT | Mod: 26,,, | Performed by: RADIOLOGY

## 2023-10-30 PROCEDURE — 77067 MAMMO DIGITAL SCREENING BILAT WITH TOMO: ICD-10-PCS | Mod: 26,,, | Performed by: RADIOLOGY

## 2023-10-30 PROCEDURE — 77063 BREAST TOMOSYNTHESIS BI: CPT | Mod: 26,,, | Performed by: RADIOLOGY

## 2023-10-30 PROCEDURE — 77063 MAMMO DIGITAL SCREENING BILAT WITH TOMO: ICD-10-PCS | Mod: 26,,, | Performed by: RADIOLOGY

## 2023-11-10 DIAGNOSIS — R92.8 ABNORMAL FINDING ON BREAST IMAGING: Primary | ICD-10-CM

## 2023-11-13 ENCOUNTER — TELEPHONE (OUTPATIENT)
Dept: RADIOLOGY | Facility: HOSPITAL | Age: 53
End: 2023-11-13
Payer: MEDICAID

## 2023-11-13 NOTE — TELEPHONE ENCOUNTER
Spoke with patient and explained mammogram findings.Patient expressed understanding of results. Patient scheduled abnormal mammogram follow up appointment at [facility] on 11/14/2023.

## 2023-11-14 ENCOUNTER — HOSPITAL ENCOUNTER (OUTPATIENT)
Dept: RADIOLOGY | Facility: HOSPITAL | Age: 53
Discharge: HOME | End: 2023-11-14
Attending: INTERNAL MEDICINE
Payer: MEDICAID

## 2023-11-14 DIAGNOSIS — R92.8 ABNORMAL FINDING ON BREAST IMAGING: ICD-10-CM

## 2023-11-14 PROCEDURE — 77065 DX MAMMO INCL CAD UNI: CPT | Mod: 26,RT,, | Performed by: RADIOLOGY

## 2023-11-14 PROCEDURE — 77061 MAMMO DIGITAL DIAGNOSTIC RIGHT WITH TOMO: ICD-10-PCS | Mod: 26,RT,, | Performed by: RADIOLOGY

## 2023-11-14 PROCEDURE — 77065 DX MAMMO INCL CAD UNI: CPT | Mod: TC,RT

## 2023-11-14 PROCEDURE — 77065 MAMMO DIGITAL DIAGNOSTIC RIGHT WITH TOMO: ICD-10-PCS | Mod: 26,RT,, | Performed by: RADIOLOGY

## 2023-11-14 PROCEDURE — 77061 BREAST TOMOSYNTHESIS UNI: CPT | Mod: 26,RT,, | Performed by: RADIOLOGY

## 2023-12-01 DIAGNOSIS — M81.0 OSTEOPOROSIS WITHOUT CURRENT PATHOLOGICAL FRACTURE, UNSPECIFIED OSTEOPOROSIS TYPE: ICD-10-CM

## 2023-12-04 RX ORDER — ALENDRONATE SODIUM 70 MG/1
70 TABLET ORAL
Qty: 4 TABLET | Refills: 0 | Status: SHIPPED | OUTPATIENT
Start: 2023-12-04 | End: 2023-12-28

## 2023-12-28 DIAGNOSIS — M81.0 OSTEOPOROSIS WITHOUT CURRENT PATHOLOGICAL FRACTURE, UNSPECIFIED OSTEOPOROSIS TYPE: ICD-10-CM

## 2023-12-28 RX ORDER — ALENDRONATE SODIUM 70 MG/1
70 TABLET ORAL
Qty: 4 TABLET | Refills: 0 | Status: SHIPPED | OUTPATIENT
Start: 2023-12-28 | End: 2024-01-29

## 2024-01-05 DIAGNOSIS — D05.12 DUCTAL CARCINOMA IN SITU (DCIS) OF LEFT BREAST: ICD-10-CM

## 2024-01-05 RX ORDER — ANASTROZOLE 1 MG/1
1 TABLET ORAL
Qty: 30 TABLET | Refills: 0 | Status: SHIPPED | OUTPATIENT
Start: 2024-01-05 | End: 2024-01-29

## 2024-01-28 DIAGNOSIS — M81.0 OSTEOPOROSIS WITHOUT CURRENT PATHOLOGICAL FRACTURE, UNSPECIFIED OSTEOPOROSIS TYPE: ICD-10-CM

## 2024-01-29 DIAGNOSIS — D05.12 DUCTAL CARCINOMA IN SITU (DCIS) OF LEFT BREAST: ICD-10-CM

## 2024-01-29 RX ORDER — ALENDRONATE SODIUM 70 MG/1
70 TABLET ORAL
Qty: 4 TABLET | Refills: 0 | Status: SHIPPED | OUTPATIENT
Start: 2024-01-29 | End: 2024-02-22

## 2024-01-29 RX ORDER — ANASTROZOLE 1 MG/1
1 TABLET ORAL
Qty: 30 TABLET | Refills: 0 | Status: SHIPPED | OUTPATIENT
Start: 2024-01-29 | End: 2024-02-26

## 2024-02-22 DIAGNOSIS — M81.0 OSTEOPOROSIS WITHOUT CURRENT PATHOLOGICAL FRACTURE, UNSPECIFIED OSTEOPOROSIS TYPE: ICD-10-CM

## 2024-02-22 RX ORDER — ALENDRONATE SODIUM 70 MG/1
70 TABLET ORAL
Qty: 4 TABLET | Refills: 0 | Status: SHIPPED | OUTPATIENT
Start: 2024-02-22

## 2024-02-25 DIAGNOSIS — D05.12 DUCTAL CARCINOMA IN SITU (DCIS) OF LEFT BREAST: ICD-10-CM

## 2024-02-26 RX ORDER — ANASTROZOLE 1 MG/1
1 TABLET ORAL
Qty: 30 TABLET | Refills: 0 | Status: SHIPPED | OUTPATIENT
Start: 2024-02-26 | End: 2024-04-21

## 2024-02-29 ENCOUNTER — LAB VISIT (OUTPATIENT)
Dept: LAB | Facility: HOSPITAL | Age: 54
End: 2024-02-29
Attending: INTERNAL MEDICINE
Payer: MEDICAID

## 2024-02-29 DIAGNOSIS — D05.12 DUCTAL CARCINOMA IN SITU (DCIS) OF LEFT BREAST: ICD-10-CM

## 2024-02-29 LAB
ALBUMIN SERPL BCP-MCNC: 3.9 G/DL (ref 3.5–5.2)
ALP SERPL-CCNC: 109 U/L (ref 55–135)
ALT SERPL W/O P-5'-P-CCNC: 35 U/L (ref 10–44)
ANION GAP SERPL CALC-SCNC: 6 MMOL/L (ref 8–16)
AST SERPL-CCNC: 26 U/L (ref 10–40)
BASOPHILS # BLD AUTO: 0.03 K/UL (ref 0–0.2)
BASOPHILS NFR BLD: 0.4 % (ref 0–1.9)
BILIRUB SERPL-MCNC: 0.2 MG/DL (ref 0.1–1)
BUN SERPL-MCNC: 10 MG/DL (ref 6–20)
CALCIUM SERPL-MCNC: 9.4 MG/DL (ref 8.7–10.5)
CHLORIDE SERPL-SCNC: 109 MMOL/L (ref 95–110)
CO2 SERPL-SCNC: 25 MMOL/L (ref 23–29)
CREAT SERPL-MCNC: 0.8 MG/DL (ref 0.5–1.4)
DIFFERENTIAL METHOD BLD: NORMAL
EOSINOPHIL # BLD AUTO: 0.2 K/UL (ref 0–0.5)
EOSINOPHIL NFR BLD: 2.3 % (ref 0–8)
ERYTHROCYTE [DISTWIDTH] IN BLOOD BY AUTOMATED COUNT: 13.2 % (ref 11.5–14.5)
EST. GFR  (NO RACE VARIABLE): >60 ML/MIN/1.73 M^2
GLUCOSE SERPL-MCNC: 113 MG/DL (ref 70–110)
HCT VFR BLD AUTO: 39.5 % (ref 37–48.5)
HGB BLD-MCNC: 13.2 G/DL (ref 12–16)
IMM GRANULOCYTES # BLD AUTO: 0.02 K/UL (ref 0–0.04)
IMM GRANULOCYTES NFR BLD AUTO: 0.2 % (ref 0–0.5)
LYMPHOCYTES # BLD AUTO: 2.1 K/UL (ref 1–4.8)
LYMPHOCYTES NFR BLD: 25.2 % (ref 18–48)
MCH RBC QN AUTO: 30.4 PG (ref 27–31)
MCHC RBC AUTO-ENTMCNC: 33.4 G/DL (ref 32–36)
MCV RBC AUTO: 91 FL (ref 82–98)
MONOCYTES # BLD AUTO: 0.5 K/UL (ref 0.3–1)
MONOCYTES NFR BLD: 6.4 % (ref 4–15)
NEUTROPHILS # BLD AUTO: 5.5 K/UL (ref 1.8–7.7)
NEUTROPHILS NFR BLD: 65.5 % (ref 38–73)
NRBC BLD-RTO: 0 /100 WBC
PLATELET # BLD AUTO: 234 K/UL (ref 150–450)
PMV BLD AUTO: 11 FL (ref 9.2–12.9)
POTASSIUM SERPL-SCNC: 3.4 MMOL/L (ref 3.5–5.1)
PROT SERPL-MCNC: 6.9 G/DL (ref 6–8.4)
RBC # BLD AUTO: 4.34 M/UL (ref 4–5.4)
SODIUM SERPL-SCNC: 140 MMOL/L (ref 136–145)
WBC # BLD AUTO: 8.34 K/UL (ref 3.9–12.7)

## 2024-02-29 PROCEDURE — 36415 COLL VENOUS BLD VENIPUNCTURE: CPT | Performed by: INTERNAL MEDICINE

## 2024-02-29 PROCEDURE — 85025 COMPLETE CBC W/AUTO DIFF WBC: CPT | Performed by: INTERNAL MEDICINE

## 2024-02-29 PROCEDURE — 80053 COMPREHEN METABOLIC PANEL: CPT | Performed by: INTERNAL MEDICINE

## 2024-03-04 ENCOUNTER — OFFICE VISIT (OUTPATIENT)
Dept: HEMATOLOGY/ONCOLOGY | Facility: CLINIC | Age: 54
End: 2024-03-04
Payer: MEDICAID

## 2024-03-04 VITALS
OXYGEN SATURATION: 97 % | BODY MASS INDEX: 26.71 KG/M2 | SYSTOLIC BLOOD PRESSURE: 116 MMHG | HEIGHT: 59 IN | DIASTOLIC BLOOD PRESSURE: 64 MMHG | HEART RATE: 94 BPM | WEIGHT: 132.5 LBS

## 2024-03-04 DIAGNOSIS — R23.2 HOT FLASHES RELATED TO AROMATASE INHIBITOR THERAPY: ICD-10-CM

## 2024-03-04 DIAGNOSIS — Z79.811 LONG TERM (CURRENT) USE OF AROMATASE INHIBITORS: ICD-10-CM

## 2024-03-04 DIAGNOSIS — T45.1X5A HOT FLASHES RELATED TO AROMATASE INHIBITOR THERAPY: ICD-10-CM

## 2024-03-04 DIAGNOSIS — D05.12 DUCTAL CARCINOMA IN SITU (DCIS) OF LEFT BREAST: Primary | ICD-10-CM

## 2024-03-04 DIAGNOSIS — M81.0 OSTEOPOROSIS WITHOUT CURRENT PATHOLOGICAL FRACTURE, UNSPECIFIED OSTEOPOROSIS TYPE: ICD-10-CM

## 2024-03-04 PROCEDURE — 3078F DIAST BP <80 MM HG: CPT | Mod: CPTII,,, | Performed by: INTERNAL MEDICINE

## 2024-03-04 PROCEDURE — 99214 OFFICE O/P EST MOD 30 MIN: CPT | Mod: S$PBB,,, | Performed by: INTERNAL MEDICINE

## 2024-03-04 PROCEDURE — 99215 OFFICE O/P EST HI 40 MIN: CPT | Mod: PBBFAC | Performed by: INTERNAL MEDICINE

## 2024-03-04 PROCEDURE — 99999 PR PBB SHADOW E&M-EST. PATIENT-LVL V: CPT | Mod: PBBFAC,,, | Performed by: INTERNAL MEDICINE

## 2024-03-04 PROCEDURE — 1159F MED LIST DOCD IN RCRD: CPT | Mod: CPTII,,, | Performed by: INTERNAL MEDICINE

## 2024-03-04 PROCEDURE — 3008F BODY MASS INDEX DOCD: CPT | Mod: CPTII,,, | Performed by: INTERNAL MEDICINE

## 2024-03-04 PROCEDURE — 3074F SYST BP LT 130 MM HG: CPT | Mod: CPTII,,, | Performed by: INTERNAL MEDICINE

## 2024-03-04 NOTE — PROGRESS NOTES
Subjective:           Patient ID: Lou Jc is a 53 y.o. female.    Chief Complaint: No chief complaint on file.         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 with positive margin s/p re-excison on 11/16/18    S/p adjuvant XRT left breast under direction of Dr. Vázquez at [facility] on 1/25/2019     Arimidex 3/2019- present            Prior Hx: Pt is a  52  y.o. female seen today for follow-up  Left breast cancer. She was initially seen at [facility] ([facility] in [place], [place]). She had a BI-RADS 0 bilateral screening mammogram for architectural distortion and subsequent bilateral diagnostic mammogram and left breast ultrasound that were BI-RADS 4 and revealed focal asymmetry at the outer central position left breast She subsequently underwent ultrasound-guided core needle biopsy on 8/17/18 with pathology showing fragmented papilloma with no evidence of atypia or in-situ or invasive carcinoma. She was then seen by General Surgery at [facility] on 8/31/18 to discuss wire localization excisional biopsy of this intraductal papilloma, which ultimately occurred on 9/21/18. Final pathology from the excision biopsy upstaged to a 2.0 cm grade 2 ER+ (moderate) UT+ (moderate) ductal carcinoma in situ with focally positive medial margin with no evidence of invasive carcinoma. She subsequently underwent re-excison on 11/16/18. Final pathology showed LCIS but no residual DCIS. Margins uninvolved with closest margins are medial and inferior - 1mm, superior 6mm. She routinely performs self breast exams. She has not noted a change on breast exam. Patient denies nipple discharge. She reports she has not undergone annual routine screening mammograms. She reports history of previous breast bx in 2015- pathologically benign. She underwent [facility] testing with was negative . She is followed by Rad/OncShe has completed adjuvant XRT left breast to a dose of  5005cGy under direction of Dr. Vázquez at [facility] on 1/25/2019 . She started on Arimidex 3/2019.     Interval Hx; No new issues  Remains on AI  Appetite and weight stable  No SOB/CP    mammo diagnostic rt and US 6/28/21   BI-RADS Category: Overall: 1 - Negative      [facility], negative Integrated [facility] with [facility]      Risk Factors/Breast History Age of Menarche: 10y.o. Age of Menopause: 48. LMP 2016. History of Hormonal Therapy: OCPs x 4 years. No HRT.Nulliparous Family History of Breast Ca: Maternal aunt (age 60s). Maternal aunt (age 70s). Paternal aunt (age 70s).   No Family hx of Ovarian Cancer. Other Family History: Father with pancreatic cancer.         Social History .She is a chronic smoker. She has smoked 1ppd x 25 yrs. She drinks 2 beers/week.          Past Medical History:   Diagnosis Date    Allergy     Breast cancer 2018    Breast cyst     DCIS (ductal carcinoma in situ)     Left    Endometriosis     Papilloma of breast     Reflux          Past Surgical History:   Procedure Laterality Date    BREAST BIOPSY Left     DCIS    BREAST CYST ASPIRATION Left     BREAST LUMPECTOMY Left 2018    2 lumpectomy    diagnostic lap      TONSILLECTOMY         Review of Systems   Constitutional:  Negative for appetite change, fatigue, fever and unexpected weight change.   HENT:  Negative for mouth sores.    Eyes:  Negative for visual disturbance.   Respiratory:  Negative for cough and shortness of breath.    Cardiovascular:  Negative for chest pain.   Gastrointestinal:  Negative for abdominal pain and diarrhea.   Endocrine:        Mild hot flashes    Genitourinary:  Negative for frequency.   Musculoskeletal:  Negative for back pain.   Skin:  Negative for rash.   Neurological:  Negative for headaches.   Hematological:  Negative for adenopathy.   Psychiatric/Behavioral:  The patient is nervous/anxious.        Objective:          Vitals:    03/04/24 1427   BP: 116/64   Pulse: 94   SpO2: 97%   Weight: 60.1 kg (132  "lb 7.9 oz)   Height: 4' 11" (1.499 m)           Physical Exam   Constitutional: She is oriented to person, place, and time. She appears well-developed and well-nourished.   HENT:   Head: Normocephalic.   Mouth/Throat: Oropharynx is clear and moist. No oropharyngeal exudate.   Eyes: Conjunctivae and lids are normal. Pupils are equal, round, and reactive to light. No scleral icterus.   Neck: Normal range of motion. Neck supple. No thyromegaly present.   Cardiovascular: Normal rate, regular rhythm and normal heart sounds.    No murmur heard.  Pulmonary/Chest: Breath sounds normal. She has no wheezes. She has no rales.   Abdominal: Soft. Bowel sounds are normal. She exhibits no distension and no mass. There is no hepatosplenomegaly. There is no tenderness. There is no rebound and no guarding.   Musculoskeletal: Normal range of motion. She exhibits no edema and no tenderness.   Right breast exhibits tenderness. Right breast exhibits no mass, no nipple discharge and no skin change. Left breast exhibits tenderness. Left breast exhibits no mass, no nipple discharge and no skin change.   Lymphadenopathy:     She has no cervical adenopathy.     She has no axillary adenopathy.        Right: No supraclavicular adenopathy present.        Left: No supraclavicular adenopathy present.   Neurological: She is alert and oriented to person, place, and time. No cranial nerve deficit. Coordination normal.   Skin: Skin is warm and dry. No ecchymosis, no petechiae and no rash noted. No erythema.   Psychiatric: She has a normal mood and affect.           Lab Results   Component Value Date    WBC 8.34 02/29/2024    HGB 13.2 02/29/2024    HCT 39.5 02/29/2024    MCV 91 02/29/2024     02/29/2024     Results for STEFFANIE JIMENEZ (MRN [id number]) as of 1/10/2019 09:20   Ref. Range 12/6/2018 10:27   Estradiol Latest Ref Range: See Text pg/mL <10 (A)   Testosterone, Free Latest Units: pg/mL 1.8     FINAL PATHOLOGIC DIAGNOSIS 9/21/2018 "   Left breast, wire localized lumpectomy:  -Ductal carcinoma in situ (DCIS), Grade 2 (intermediate), focally involving the medial margin, present in 5  blocks with an estimated size of at least 20 mm (see cancer case summary below)  -Background breast tissue with fibrocystic change including dense fibrosis, papillomatosis, adenosis, cystic  dilatation, apocrine metaplasia, and fibroadenomatoid nodules  -Biopsy site changes  -No evidence of invasive carcinoma  Surgical pathology cancer case summary:  -Procedure: Excision (less than total mastectomy)  -Specimen laterality: Left  -Size (extent) of DCIS: Estimated size of DCIS: At least 20 mm: Number blocks with DCIS: 5  -Histologic type: Ductal carcinoma in situ  -Architectural patterns: Solid  -Nuclear grade: Grade 2 (intermediate)  -Necrosis: Not identified  -Margins: Medial margin focally positive for DCIS  -Regional lymph nodes: No lymph nodes submitted or found  -Pathologic stage classification (pTNM):  Primary tumor (pT): pTis (DCIS): Ductal carcinoma in situ  Regional lymph nodes (pN): pNX: Regional lymph nodes cannot be assessed  -Microcalcifications: Present in DCIS and nonneoplastic tissue     Hormone receptor results (performed with appropriate controls):  ER - Positive (moderate)  LA - Positive (moderate)           FINAL PATHOLOGIC DIAGNOSIS  11/16/2018    1. LEFT BREAST, RE-EXCISION:  No residual ductal carcinoma in-situ  Lobular carcinoma in-situ  Margins negative for neoplasia or malignancy (closest margins are medial and inferior - 1mm, superior 6mm)  Atypical lobular hyperplasia  Intraductal papilloma  Negative for malignancy  Extensive previous procedure related changes present  (specimen has been submitted in it's entirety)  2. NEW ANTERIOR MARGIN, EXCISION:  Benign skin and subcutaneous tissue with extensive inflammation and procedure related changes  Negative for neoplasia or malignancy  (specimen has been submitted in its  entirety)  Part1.  Immunostain E-Cadherin has been performed (with appropriate controls) on two sections #1C AND 1G and is  negative, supporting the above diagnosis.  Hormone receptors performed for LCIS (Part 1)  ER - Positive (90% of the tumor nuclei, moderate to strong)  SD - Positive (30% of the tumor nuclei, weak)  HER2 - Indeterminate (2+), specimen will be sent out for FISH analysis  Ki67 - 5%, positive tumor nuclei  [id number],PGR        Bone Density  1/18/2018   Osteopenia.  Ten year probability of major osteoporotic fracture is 3.9%, and hip fracture is 0.2%.      mammo screening 6/15/21   Impression:  Right  Architectural Distortion: Right breast architectural distortion at the upper position. Assessment: 0 - Incomplete.      Left  There is no mammographic evidence of malignancy in the left breast.     BI-RADS Category:   Overall: 0 - Incomplete: Needs Additional Imaging Evaluation     Recommendation:  Diagnostic mammogram with possible ultrasound (if indicated) is recommended.              Mammo 11/14/23 BI-RADS Category 1: Negative     Recommendation:  Annual screening mammography, next due October 2024.    Annual screening bilateral breast MRI with IV contrast, next due April 2024.      Assessment:       1. Ductal carcinoma in situ (DCIS) of left breast    2. Long term (current) use of aromatase inhibitors    3. Hot flashes related to aromatase inhibitor therapy    4. Osteoporosis without current pathological fracture, unspecified osteoporosis type                Plan:       1.,2.  Patient is a 53 y.o. female with Stage 0 pTis N0 M0 grade 2 ER + SD + DCIS of the left breast status post left partial mastectomy with focally positive medial margin s/p re-excison on 11/16/18. Final pathology showed LCIS but no residual DCIS with negative with closest margins are medial and inferior - 1mm   S/p  adjuvant XRT  under the direction of Rad/Onc at [facility] completed 1/25/2019   [facility], negative  Integrated [facility]Analysis with [facility]   Cont with Arimidex  Mammo 11/14/23 BI-RADS Category 1: Negative   Annual screening mammography, next due October 2024.    Annual screening bilateral breast MRI with IV contrast, next due April 2024.  She will continue with monthly self-breast exams      3 improved  No longer taking clonidine    4. . Bone density 2022 recent reveals osteoporosis  Cont Ca and Vit D   Fosamax Rx refill provided  Continue weight bearing exercises  Pt former smoker    Bone Density prior to f/u   MRI breast April 2024  She will follow-up in 6  Mos with cbc,cmp, vit D  prior to f/u     .    Advance Care Planning     Power of   I previously  initiated the process of advance care planning today and explained the importance of this process to the patient.  I introduced the concept of advance directives to the patient, as well. Then the patient received detailed information about the importance of designating a Health Care Power of  (HCPOA). She was also instructed to communicate with this person about their wishes for future healthcare, should she become sick and lose decision-making capacity. The patient has not previously appointed a HCPOA. After our discussion, the patient has decided to complete a HCPOA and has appointed her brother  Xiang Trinidad [phone] and NAME:  Leon Lay [phone]  I spent a total time of 16  minutes discussing this issue with the patient.    Advance Care Planning     Living Will  During previous visit, I engaged the patient in the advance care planning process.  The patient and I reviewed the role for advance directives and their purpose in directing future healthcare if the patient's unable to speak for him/herself.  At this point in time, the patient does have full decision-making capacity.  We discussed different extreme health states that she could experience, and reviewed what kind of medical care she would want in those situations.   The patient communicated that if she were comatose and had little chance of a meaningful recovery, she would not want machines/life-sustaining treatments used.    The patient has completed a living will to reflect these preferences.  The patient  has already designated a healthcare power of  to make decisions on her behalf.   I spent a total of  16  minutes engaging the patient in this advance care planning discussion.           Cc: MD Maryjo Pagan MD

## 2024-04-18 DIAGNOSIS — D05.12 DUCTAL CARCINOMA IN SITU (DCIS) OF LEFT BREAST: ICD-10-CM

## 2024-04-21 RX ORDER — ANASTROZOLE 1 MG/1
1 TABLET ORAL
Qty: 30 TABLET | Refills: 0 | Status: SHIPPED | OUTPATIENT
Start: 2024-04-21 | End: 2024-06-10

## 2024-04-29 ENCOUNTER — HOSPITAL ENCOUNTER (OUTPATIENT)
Dept: RADIOLOGY | Facility: HOSPITAL | Age: 54
Discharge: HOME | End: 2024-04-29
Attending: INTERNAL MEDICINE
Payer: MEDICAID

## 2024-04-29 DIAGNOSIS — D05.12 DUCTAL CARCINOMA IN SITU (DCIS) OF LEFT BREAST: ICD-10-CM

## 2024-04-29 PROCEDURE — A9577 INJ MULTIHANCE: HCPCS | Performed by: INTERNAL MEDICINE

## 2024-04-29 PROCEDURE — 77049 MRI BREAST C-+ W/CAD BI: CPT | Mod: 26,,, | Performed by: RADIOLOGY

## 2024-04-29 PROCEDURE — 25500020 PHARM REV CODE 255: Performed by: INTERNAL MEDICINE

## 2024-04-29 PROCEDURE — 77049 MRI BREAST C-+ W/CAD BI: CPT | Mod: TC

## 2024-04-29 RX ADMIN — GADOBENATE DIMEGLUMINE 12 ML: 529 INJECTION, SOLUTION INTRAVENOUS at 11:04

## 2024-05-02 ENCOUNTER — TELEPHONE (OUTPATIENT)
Dept: OBSTETRICS AND GYNECOLOGY | Facility: CLINIC | Age: 54
End: 2024-05-02
Payer: MEDICAID

## 2024-05-02 NOTE — TELEPHONE ENCOUNTER
----- Message from Michelle Grayson sent at 5/2/2024 11:39 AM CDT -----  Regarding: appt/ medicad  Name of Who is Calling:pt          What is the request in detail: pt is calling to make a wwe appt. She has medicaid and I am unable to schedule. Please call to schedule.           Can the clinic reply by [facility]:          What Number to Call Back [facility] not [facility]:[phone]

## 2024-05-15 ENCOUNTER — TELEPHONE (OUTPATIENT)
Dept: OPTOMETRY | Facility: CLINIC | Age: 54
End: 2024-05-15
Payer: MEDICAID

## 2024-05-15 NOTE — TELEPHONE ENCOUNTER
----- Message from Addis Foster sent at 5/15/2024  2:48 PM CDT -----  Contact: pt @ [phone]  Lou Jc calling regarding Appointment Access  (message) for #pt is calling to get np appt, pt has referral in chart asking for call back

## 2024-06-07 DIAGNOSIS — D05.12 DUCTAL CARCINOMA IN SITU (DCIS) OF LEFT BREAST: ICD-10-CM

## 2024-06-10 RX ORDER — ANASTROZOLE 1 MG/1
1 TABLET ORAL
Qty: 30 TABLET | Refills: 0 | Status: SHIPPED | OUTPATIENT
Start: 2024-06-10

## 2024-06-27 ENCOUNTER — OFFICE VISIT (OUTPATIENT)
Dept: OBSTETRICS AND GYNECOLOGY | Facility: CLINIC | Age: 54
End: 2024-06-27
Attending: OBSTETRICS & GYNECOLOGY
Payer: MEDICAID

## 2024-06-27 ENCOUNTER — OFFICE VISIT (OUTPATIENT)
Dept: OPTOMETRY | Facility: CLINIC | Age: 54
End: 2024-06-27
Payer: MEDICAID

## 2024-06-27 VITALS
BODY MASS INDEX: 27.21 KG/M2 | SYSTOLIC BLOOD PRESSURE: 147 MMHG | HEIGHT: 59 IN | WEIGHT: 135 LBS | HEART RATE: 91 BPM | DIASTOLIC BLOOD PRESSURE: 80 MMHG

## 2024-06-27 DIAGNOSIS — H52.7 REFRACTIVE ERROR: ICD-10-CM

## 2024-06-27 DIAGNOSIS — Z01.00 ROUTINE EYE EXAM: Primary | ICD-10-CM

## 2024-06-27 DIAGNOSIS — Z01.419 ENCOUNTER FOR GYNECOLOGICAL EXAMINATION WITHOUT ABNORMAL FINDING: ICD-10-CM

## 2024-06-27 DIAGNOSIS — R45.89 FEELING OF SADNESS: ICD-10-CM

## 2024-06-27 DIAGNOSIS — Z12.4 SCREENING FOR MALIGNANT NEOPLASM OF THE CERVIX: Primary | ICD-10-CM

## 2024-06-27 DIAGNOSIS — D05.12 DUCTAL CARCINOMA IN SITU (DCIS) OF LEFT BREAST: ICD-10-CM

## 2024-06-27 PROCEDURE — 99213 OFFICE O/P EST LOW 20 MIN: CPT | Mod: PBBFAC,PO | Performed by: OPTOMETRIST

## 2024-06-27 PROCEDURE — 1160F RVW MEDS BY RX/DR IN RCRD: CPT | Mod: CPTII,,, | Performed by: OPTOMETRIST

## 2024-06-27 PROCEDURE — 1159F MED LIST DOCD IN RCRD: CPT | Mod: CPTII,,, | Performed by: OPTOMETRIST

## 2024-06-27 PROCEDURE — 92015 DETERMINE REFRACTIVE STATE: CPT | Mod: ,,, | Performed by: OPTOMETRIST

## 2024-06-27 PROCEDURE — 99999 PR PBB SHADOW E&M-EST. PATIENT-LVL III: CPT | Mod: PBBFAC,,, | Performed by: OPTOMETRIST

## 2024-06-27 PROCEDURE — 99215 OFFICE O/P EST HI 40 MIN: CPT | Mod: PBBFAC,27 | Performed by: OBSTETRICS & GYNECOLOGY

## 2024-06-27 PROCEDURE — 88175 CYTOPATH C/V AUTO FLUID REDO: CPT | Performed by: OBSTETRICS & GYNECOLOGY

## 2024-06-27 PROCEDURE — 92004 COMPRE OPH EXAM NEW PT 1/>: CPT | Mod: S$PBB,,, | Performed by: OPTOMETRIST

## 2024-06-27 PROCEDURE — 87624 HPV HI-RISK TYP POOLED RSLT: CPT | Performed by: OBSTETRICS & GYNECOLOGY

## 2024-06-27 PROCEDURE — 99999 PR PBB SHADOW E&M-EST. PATIENT-LVL V: CPT | Mod: PBBFAC,,, | Performed by: OBSTETRICS & GYNECOLOGY

## 2024-06-27 NOTE — PROGRESS NOTES
Subjective:       Patient ID: Lou Jc is a 54 y.o. female      Chief Complaint   Patient presents with    Concerns About Ocular Health     History of Present Illness  Dls: ? Yrs    55 y/o female presents today with c/o blurry vision at distance and near ou.  Pt wears otc readers.     No tearing  No itching  No burning  No pain  No ha's  + ou  floaters  No flashes    Eye meds  None    Pohx:   None    Fohx:   None         Assessment/Plan:     1. Routine eye exam  Good ocular health OU    2. Refractive error  Educated patient on refractive error and discussed lens options. Dispensed updated spectacle Rx. Educated about adaptation period to new specs.      Eyeglass Final Rx       Eyeglass Final Rx         Sphere Cylinder Add    Right +1.00 Sphere +2.00    Left +1.25 Sphere +2.00      Expiration Date: 6/27/2025                      Follow up in about 1 year (around 6/27/2025).

## 2024-06-27 NOTE — PROGRESS NOTES
SUBJECTIVE:   54 y.o. female   for annual routine Pap and checkup. Patient's last menstrual period was 2016..  She is tearful today. She reports that she has nto had sex with her boyfriend in 5 years. She reports that he treats her well but does not have sex with her. This makes her feel not wanted. She is tearful today.         Past Medical History:   Diagnosis Date    Allergy     Breast cancer 2018    Breast cyst     DCIS (ductal carcinoma in situ)     Left    Endometriosis     Papilloma of breast     Reflux      Past Surgical History:   Procedure Laterality Date    BREAST BIOPSY Left     DCIS    BREAST CYST ASPIRATION Left     BREAST LUMPECTOMY Left 2018    2 lumpectomy    diagnostic lap      TONSILLECTOMY       Social History     Socioeconomic History    Marital status:     Years of education: unknown   Tobacco Use    Smoking status: Former     Current packs/day: 0.00     Average packs/day: 0.5 packs/day for 20.0 years (10.0 ttl pk-yrs)     Types: Cigarettes     Start date: 1997     Quit date: 2017     Years since quittin.8    Smokeless tobacco: Never   Substance and Sexual Activity    Alcohol use: Yes     Alcohol/week: 1.0 standard drink of alcohol     Types: 1 Cans of beer per week     Comment: weekly    Drug use: Not Currently    Sexual activity: Not Currently     Partners: Male     Birth control/protection: None     Family History   Problem Relation Name Age of Onset    Cataracts Maternal Grandmother      Heart attack Maternal Grandfather      Pacemaker/defibrilator Maternal Grandfather      Cataracts Maternal Grandfather      Heart disease Father      Cancer Father          Pancreatic    Diabetes Father      Bone cancer Father      Pacemaker/defibrilator Father      Diabetes Mother      Heart disease Mother      COPD Mother      Hypertension Mother      Kidney disease Mother      Cancer Mother      Throat cancer Mother      Depression Mother      Hearing loss Mother       Heart failure Mother      Pacemaker/defibrilator Mother      Cataracts Mother      Breast cancer Maternal Aunt Ty     Breast cancer Maternal Aunt      Breast cancer Paternal Aunt Raina     Colon cancer Neg Hx      Ovarian cancer Neg Hx      Uterine cancer Neg Hx      Cervical cancer Neg Hx       OB History    Para Term  AB Living   0 0 0 0 0 0   SAB IAB Ectopic Multiple Live Births   0 0 0 0             Current Outpatient Medications   Medication Sig Dispense Refill    alendronate (FOSAMAX) 70 MG tablet Take 1 tablet by mouth once a week 4 tablet 0    ALLERGY RELIEF, LORATADINE, 10 mg tablet Take 1 tablet by mouth once daily 30 tablet 0    amoxicillin (AMOXIL) 500 MG capsule Take 1 capsule (500 mg total) by mouth every 8 (eight) hours. 15 capsule 0    amoxicillin-clavulanate 875-125mg (AUGMENTIN) 875-125 mg per tablet Take 1 tablet by mouth every 12 (twelve) hours. 14 tablet 0    anastrozole (ARIMIDEX) 1 mg Tab Take 1 tablet (1 mg total) by mouth once daily. 30 tablet 3    anastrozole (ARIMIDEX) 1 mg Tab Take 1 tablet by mouth once daily 30 tablet 2    anastrozole (ARIMIDEX) 1 mg Tab Take 1 tablet by mouth once daily 30 tablet 0    aspirin-acetaminophen-caffeine 250-250-65 mg (EXCEDRIN MIGRAINE) 250-250-65 mg per tablet Take 1 tablet by mouth every 6 (six) hours as needed for Pain.  0    azithromycin (Z-SAVANNAH) 250 MG tablet Take 1 tablet (250 mg total) by mouth once daily. 1 pack, take as directed 6 tablet 0    azithromycin (Z-SAVANNAH) 250 MG tablet Take 1 tablet (250 mg total) by mouth once daily. 1 pack, take as directed 6 tablet 0    calcium-vitamin D3 (OS-EMERALD 500 + D3) 500 mg-5 mcg (200 unit) per tablet Take 1 tablet by mouth 2 (two) times daily with meals.      carbamide peroxide (DEBROX) 6.5 % otic solution Place 5 drops into both ears 2 (two) times daily. 18 mL 0    ciclopirox (PENLAC) 8 % Soln Apply topically nightly. 1 Bottle 3    ciclopirox (PENLAC) 8 % Soln Apply topically nightly. 6.6 mL 3     clobetasol 0.05% (TEMOVATE) 0.05 % Oint Apply topically 2 (two) times daily. 30 g 1    clonazePAM (KLONOPIN) 1 MG tablet Take 1 tablet (1 mg total) by mouth every evening. 30 tablet 1    cloNIDine (CATAPRES) 0.1 MG tablet Take 1 tablet by mouth twice daily 60 tablet 2    diclofenac sodium (VOLTAREN) 1 % Gel Apply 2 g topically 4 (four) times daily. 100 g 0    diclofenac sodium (VOLTAREN) 1 % Gel APPLY 2 GRAMS TOPICALLY 4 TIMES DAILY 100 g 0    ergocalciferol (ERGOCALCIFEROL) 50,000 unit Cap Take 1 capsule by mouth once a week 4 capsule 0    fluticasone propionate (FLONASE) 50 mcg/actuation nasal spray 1 spray (50 mcg total) by Each Nostril route once daily. 18 g 0    fluticasone propionate (FLONASE) 50 mcg/actuation nasal spray 1 spray (50 mcg total) by Each Nostril route once daily. 18.2 g 0    gentian violet 1 % topical solution Use as directed 0.5 mLs in the mouth or throat 4 (four) times daily. 59 mL 0    hydrocortisone 2.5 % cream Apply topically 2 (two) times daily. 20 g 1    hydrocortisone 2.5 % ointment Apply topically 2 (two) times daily. To affected area 20 g 0    hydrOXYzine HCL (ATARAX) 25 MG tablet Take 1 tablet (25 mg total) by mouth every 6 (six) hours as needed for Itching. 30 tablet 0    ibuprofen (ADVIL,MOTRIN) 800 MG tablet every 6 (six) hours as needed.       ketoconazole (NIZORAL) 2 % cream Apply topically once daily. 60 g 3    ketoconazole (NIZORAL) 200 mg Tab Take 1 tablet (200 mg total) by mouth once daily. 7 tablet 0    ketoconazole, bulk, Powd 1 application by Misc.(Non-Drug; Combo Route) route 2 (two) times daily. 100 g 0    loratadine (CLARITIN) 10 mg tablet Take 1 tablet (10 mg total) by mouth once daily. 30 tablet 0    loratadine (CLARITIN) 10 mg tablet Take 1 tablet by mouth once daily 30 tablet 0    LORazepam (ATIVAN) 0.5 MG tablet Take 1 tablet (0.5 mg total) by mouth On call Procedure for Anxiety. 2 tablet 0    meloxicam (MOBIC) 7.5 MG tablet Take 1 tablet (7.5 mg total) by mouth  once daily. 30 tablet 1    mupirocin (BACTROBAN) 2 % ointment Apply topically 2 (two) times daily. AAA 20 g 0    mupirocin (BACTROBAN) 2 % ointment Apply topically 3 (three) times daily. 22 g 0    naproxen (NAPROSYN) 500 MG tablet TAKE 1 TABLET BY MOUTH TWICE DAILY WITH MEALS 90 tablet 0    naproxen (NAPROSYN) 500 MG tablet Take 1 tablet by mouth twice daily 60 tablet 0    nirmatrelvir-ritonavir (PAXLOVID, EUA,) 150-100 mg DsPk Take 1 tablet by mouth 2 (two) times daily. 10 tablet 0    NYSTOP powder APPLY  POWDER TOPICALLY TO AFFECTED AREA TWICE DAILY 60 g 0    ondansetron (ZOFRAN) 4 MG tablet TAKE 1 TABLET BY MOUTH EVERY 8 HOURS AS NEEDED FOR NAUSEA 60 tablet 0    pantoprazole (PROTONIX) 40 MG tablet Take 1 tablet (40 mg total) by mouth once daily. 30 tablet 2    permethrin (ELIMITE) 5 % cream Apply 5% cream to entire body from the neck down, then wash off after 8 to 14 hours. Repeat if symptoms have not resolved in 14 days. 60 g 0    promethazine-dextromethorphan (PROMETHAZINE-DM) 6.25-15 mg/5 mL Syrp TAKE 5 ML BY MOUTH  IN THE EVENING FOR 10 DAYS 120 mL 0    sulfamethoxazole-trimethoprim 800-160mg (BACTRIM DS) 800-160 mg Tab Take 1 tablet by mouth 2 (two) times daily. 8 tablet 0    sulfamethoxazole-trimethoprim 800-160mg (BACTRIM DS) 800-160 mg Tab Take 1 tablet by mouth 2 (two) times daily. 6 tablet 0    traMADoL (ULTRAM) 50 mg tablet Take 1 tablet (50 mg total) by mouth every 12 (twelve) hours as needed for Pain. 30 tablet 0    traMADoL (ULTRAM) 50 mg tablet Take 1 tablet (50 mg total) by mouth every 12 (twelve) hours as needed for Pain. 22 tablet 0    traMADoL (ULTRAM) 50 mg tablet Take 1 tablet (50 mg total) by mouth every 24 hours as needed for Pain. 15 tablet 0     No current facility-administered medications for this visit.     Allergies: Patient has no known allergies.     The 10-year ASCVD risk score (Ana HANDY, et al., 2019) is: 2.4%    Values used to calculate the score:      Age: 54 years      Sex:  Female      Is Non- : No      Diabetic: No      Tobacco smoker: No      Systolic Blood Pressure: 147 mmHg      Is BP treated: No      HDL Cholesterol: 59 mg/dL      Total Cholesterol: 177 mg/dL      ROS:  Constitutional: no weight loss, weight gain, fever, fatigue  Eyes:  No vision changes, glasses/contacts  ENT/Mouth: No ulcers, sinus problems, ears ringing, headache  Cardiovascular: No inability to lie flat, chest pain, exercise intolerance, swelling, heart palpitations  Respiratory: No wheezing, coughing blood, shortness of breath, or cough  Gastrointestinal: No diarrhea, bloody stool, nausea/vomiting, constipation, gas, hemorrhoids  Genitourinary: No blood in urine, painful urination, urgency of urination, frequency of urination, incomplete emptying, incontinence, abnormal bleeding, painful periods, heavy periods, vaginal discharge, vaginal odor, painful intercourse, sexual problems, bleeding after intercourse.  Musculoskeletal: No muscle weakness  Skin/Breast: No painful breasts, nipple discharge, masses, rash, ulcers  Neurological: No passing out, seizures, numbness, headache  Endocrine: No diabetes, hypothyroid, hyperthyroid, hot flashes, hair loss, abnormal hair growth, acne  Psychiatric: No depression, +crying  Hematologic: No bruises, bleeding, swollen lymph nodes, anemia.      Physical Exam:   Constitutional: She is oriented to person, place, and time. She appears well-developed and well-nourished.      Neck: No tracheal deviation present. No thyromegaly present.    Cardiovascular:       Exam reveals no edema.        Pulmonary/Chest: Effort normal. She exhibits no mass, no tenderness, no deformity and no retraction. Right breast exhibits no inverted nipple, no mass, no nipple discharge, no skin change, no tenderness, presence, no bleeding and no swelling. Left breast exhibits no inverted nipple, no mass, no nipple discharge, no skin change, no tenderness, presence, no bleeding and  no swelling. Breasts are symmetrical.        Abdominal: Soft. She exhibits no distension and no mass. There is no abdominal tenderness. There is no rebound and no guarding. No hernia. Hernia confirmed negative in the left inguinal area.     Genitourinary:    Vagina and uterus normal.   Rectum:      No external hemorrhoid.   There is no rash, tenderness or lesion on the right labia. There is no rash, tenderness or lesion on the left labia. Cervix is normal. No no adexnal prolapse. Right adnexum displays no mass, no tenderness and no fullness. Left adnexum displays no mass, no tenderness and no fullness. No vaginal discharge, tenderness, bleeding, rectocele, cystocele or prolapse of vaginal walls in the vagina. Cervix exhibits no motion tenderness, no discharge and no friability. Uterus is not deviated.           Musculoskeletal: Normal range of motion and moves all extremeties. No edema.       Neurological: She is alert and oriented to person, place, and time.    Skin: No rash noted. No erythema. No pallor.    Psychiatric: She has a normal mood and affect. Her behavior is normal. Judgment and thought content normal.     Left lumpectomy    ASSESSMENT:   well woman  History of breast cancer  Sadness  PLAN:   mammogram  pap smear  Referral to psychology at cancer center to discuss feeling unworthy and relationship issues  return annually or prn    FEMALE CHApERONE PRESENT FOR ENTIRE PROCEDURE

## 2024-06-29 LAB
CLINICAL INFO: NORMAL
DATE OF PREVIOUS PAP: NORMAL
DATE PREVIOUS BX: NO
LMP START DATE: NORMAL
SPECIMEN SOURCE CVX/VAG CYTO: NORMAL

## 2024-07-01 ENCOUNTER — TELEPHONE (OUTPATIENT)
Dept: PSYCHIATRY | Facility: CLINIC | Age: 54
End: 2024-07-01
Payer: MEDICAID

## 2024-07-08 DIAGNOSIS — D05.12 DUCTAL CARCINOMA IN SITU (DCIS) OF LEFT BREAST: ICD-10-CM

## 2024-07-09 ENCOUNTER — TELEPHONE (OUTPATIENT)
Dept: PSYCHIATRY | Facility: CLINIC | Age: 54
End: 2024-07-09
Payer: MEDICAID

## 2024-07-09 RX ORDER — ANASTROZOLE 1 MG/1
1 TABLET ORAL
Qty: 30 TABLET | Refills: 0 | Status: SHIPPED | OUTPATIENT
Start: 2024-07-09

## 2024-07-23 ENCOUNTER — TELEPHONE (OUTPATIENT)
Dept: PSYCHIATRY | Facility: CLINIC | Age: 54
End: 2024-07-23
Payer: MEDICAID

## 2024-08-02 ENCOUNTER — TELEPHONE (OUTPATIENT)
Dept: HEMATOLOGY/ONCOLOGY | Facility: CLINIC | Age: 54
End: 2024-08-02
Payer: MEDICAID

## 2024-08-02 NOTE — TELEPHONE ENCOUNTER
Spoke w/ pt in regards to r/s appt with Dr. Silva. Pt apologize for having to r/s and appt to schedule for Tuesday 8/6 @ 1PM, in person.      [place], [place] ext [phone]

## 2024-08-03 DIAGNOSIS — D05.12 DUCTAL CARCINOMA IN SITU (DCIS) OF LEFT BREAST: ICD-10-CM

## 2024-08-06 ENCOUNTER — OFFICE VISIT (OUTPATIENT)
Dept: PSYCHIATRY | Facility: CLINIC | Age: 54
End: 2024-08-06
Payer: MEDICAID

## 2024-08-06 DIAGNOSIS — R45.89 FEELING OF SADNESS: ICD-10-CM

## 2024-08-06 DIAGNOSIS — F43.29 STRESS AND ADJUSTMENT REACTION: Primary | ICD-10-CM

## 2024-08-06 PROCEDURE — 90791 PSYCH DIAGNOSTIC EVALUATION: CPT | Mod: ,,, | Performed by: PSYCHOLOGIST

## 2024-08-06 PROCEDURE — 99999 PR PBB SHADOW E&M-EST. PATIENT-LVL II: CPT | Mod: PBBFAC,,, | Performed by: PSYCHOLOGIST

## 2024-08-06 PROCEDURE — 99212 OFFICE O/P EST SF 10 MIN: CPT | Mod: PBBFAC | Performed by: PSYCHOLOGIST

## 2024-08-06 PROCEDURE — 1159F MED LIST DOCD IN RCRD: CPT | Mod: CPTII,,, | Performed by: PSYCHOLOGIST

## 2024-08-07 ENCOUNTER — TELEPHONE (OUTPATIENT)
Dept: HEMATOLOGY/ONCOLOGY | Facility: CLINIC | Age: 54
End: 2024-08-07
Payer: MEDICAID

## 2024-08-07 RX ORDER — ANASTROZOLE 1 MG/1
1 TABLET ORAL
Qty: 30 TABLET | Refills: 0 | OUTPATIENT
Start: 2024-08-07

## 2024-08-14 ENCOUNTER — TELEPHONE (OUTPATIENT)
Dept: HEMATOLOGY/ONCOLOGY | Facility: CLINIC | Age: 54
End: 2024-08-14
Payer: MEDICAID

## 2024-08-14 ENCOUNTER — LAB VISIT (OUTPATIENT)
Dept: LAB | Facility: HOSPITAL | Age: 54
End: 2024-08-14
Attending: INTERNAL MEDICINE
Payer: MEDICAID

## 2024-08-14 ENCOUNTER — OFFICE VISIT (OUTPATIENT)
Dept: HEMATOLOGY/ONCOLOGY | Facility: CLINIC | Age: 54
End: 2024-08-14
Payer: MEDICAID

## 2024-08-14 VITALS
OXYGEN SATURATION: 97 % | DIASTOLIC BLOOD PRESSURE: 77 MMHG | BODY MASS INDEX: 27.03 KG/M2 | RESPIRATION RATE: 18 BRPM | SYSTOLIC BLOOD PRESSURE: 111 MMHG | HEART RATE: 83 BPM | HEIGHT: 59 IN | WEIGHT: 134.06 LBS

## 2024-08-14 DIAGNOSIS — Z79.811 LONG TERM (CURRENT) USE OF AROMATASE INHIBITORS: ICD-10-CM

## 2024-08-14 DIAGNOSIS — M81.0 OSTEOPOROSIS WITHOUT CURRENT PATHOLOGICAL FRACTURE, UNSPECIFIED OSTEOPOROSIS TYPE: ICD-10-CM

## 2024-08-14 DIAGNOSIS — N95.1 HOT FLASHES DUE TO MENOPAUSE: ICD-10-CM

## 2024-08-14 DIAGNOSIS — D05.12 DUCTAL CARCINOMA IN SITU (DCIS) OF LEFT BREAST: ICD-10-CM

## 2024-08-14 DIAGNOSIS — D05.12 DUCTAL CARCINOMA IN SITU (DCIS) OF LEFT BREAST: Primary | ICD-10-CM

## 2024-08-14 LAB
ALBUMIN SERPL BCP-MCNC: 3.9 G/DL (ref 3.5–5.2)
ALBUMIN SERPL BCP-MCNC: 3.9 G/DL (ref 3.5–5.2)
ALP SERPL-CCNC: 123 U/L (ref 55–135)
ALP SERPL-CCNC: 123 U/L (ref 55–135)
ALT SERPL W/O P-5'-P-CCNC: 32 U/L (ref 10–44)
ALT SERPL W/O P-5'-P-CCNC: 32 U/L (ref 10–44)
ANION GAP SERPL CALC-SCNC: 10 MMOL/L (ref 8–16)
ANION GAP SERPL CALC-SCNC: 10 MMOL/L (ref 8–16)
AST SERPL-CCNC: 23 U/L (ref 10–40)
AST SERPL-CCNC: 23 U/L (ref 10–40)
BASOPHILS # BLD AUTO: 0.03 K/UL (ref 0–0.2)
BASOPHILS NFR BLD: 0.4 % (ref 0–1.9)
BILIRUB SERPL-MCNC: 0.2 MG/DL (ref 0.1–1)
BILIRUB SERPL-MCNC: 0.2 MG/DL (ref 0.1–1)
BUN SERPL-MCNC: 10 MG/DL (ref 6–20)
BUN SERPL-MCNC: 10 MG/DL (ref 6–20)
CALCIUM SERPL-MCNC: 10 MG/DL (ref 8.7–10.5)
CALCIUM SERPL-MCNC: 10 MG/DL (ref 8.7–10.5)
CHLORIDE SERPL-SCNC: 107 MMOL/L (ref 95–110)
CHLORIDE SERPL-SCNC: 107 MMOL/L (ref 95–110)
CO2 SERPL-SCNC: 23 MMOL/L (ref 23–29)
CO2 SERPL-SCNC: 23 MMOL/L (ref 23–29)
CREAT SERPL-MCNC: 0.8 MG/DL (ref 0.5–1.4)
CREAT SERPL-MCNC: 0.8 MG/DL (ref 0.5–1.4)
DIFFERENTIAL METHOD BLD: NORMAL
EOSINOPHIL # BLD AUTO: 0.2 K/UL (ref 0–0.5)
EOSINOPHIL NFR BLD: 2.5 % (ref 0–8)
ERYTHROCYTE [DISTWIDTH] IN BLOOD BY AUTOMATED COUNT: 12.9 % (ref 11.5–14.5)
EST. GFR  (NO RACE VARIABLE): >60 ML/MIN/1.73 M^2
EST. GFR  (NO RACE VARIABLE): >60 ML/MIN/1.73 M^2
GLUCOSE SERPL-MCNC: 97 MG/DL (ref 70–110)
GLUCOSE SERPL-MCNC: 97 MG/DL (ref 70–110)
HBV CORE AB SERPL QL IA: NORMAL
HCT VFR BLD AUTO: 41.1 % (ref 37–48.5)
HGB BLD-MCNC: 13.3 G/DL (ref 12–16)
IMM GRANULOCYTES # BLD AUTO: 0.03 K/UL (ref 0–0.04)
IMM GRANULOCYTES NFR BLD AUTO: 0.4 % (ref 0–0.5)
LYMPHOCYTES # BLD AUTO: 2.2 K/UL (ref 1–4.8)
LYMPHOCYTES NFR BLD: 30.9 % (ref 18–48)
MCH RBC QN AUTO: 30.4 PG (ref 27–31)
MCHC RBC AUTO-ENTMCNC: 32.4 G/DL (ref 32–36)
MCV RBC AUTO: 94 FL (ref 82–98)
MONOCYTES # BLD AUTO: 0.4 K/UL (ref 0.3–1)
MONOCYTES NFR BLD: 5.4 % (ref 4–15)
NEUTROPHILS # BLD AUTO: 4.4 K/UL (ref 1.8–7.7)
NEUTROPHILS NFR BLD: 60.4 % (ref 38–73)
NRBC BLD-RTO: 0 /100 WBC
PLATELET # BLD AUTO: 254 K/UL (ref 150–450)
PMV BLD AUTO: 11 FL (ref 9.2–12.9)
POTASSIUM SERPL-SCNC: 3.8 MMOL/L (ref 3.5–5.1)
POTASSIUM SERPL-SCNC: 3.8 MMOL/L (ref 3.5–5.1)
PROT SERPL-MCNC: 6.8 G/DL (ref 6–8.4)
PROT SERPL-MCNC: 6.8 G/DL (ref 6–8.4)
RBC # BLD AUTO: 4.37 M/UL (ref 4–5.4)
SODIUM SERPL-SCNC: 140 MMOL/L (ref 136–145)
SODIUM SERPL-SCNC: 140 MMOL/L (ref 136–145)
WBC # BLD AUTO: 7.21 K/UL (ref 3.9–12.7)

## 2024-08-14 PROCEDURE — 3074F SYST BP LT 130 MM HG: CPT | Mod: CPTII,,, | Performed by: INTERNAL MEDICINE

## 2024-08-14 PROCEDURE — 99213 OFFICE O/P EST LOW 20 MIN: CPT | Mod: PBBFAC | Performed by: INTERNAL MEDICINE

## 2024-08-14 PROCEDURE — 3078F DIAST BP <80 MM HG: CPT | Mod: CPTII,,, | Performed by: INTERNAL MEDICINE

## 2024-08-14 PROCEDURE — 85025 COMPLETE CBC W/AUTO DIFF WBC: CPT | Performed by: INTERNAL MEDICINE

## 2024-08-14 PROCEDURE — 3008F BODY MASS INDEX DOCD: CPT | Mod: CPTII,,, | Performed by: INTERNAL MEDICINE

## 2024-08-14 PROCEDURE — 99214 OFFICE O/P EST MOD 30 MIN: CPT | Mod: S$PBB,,, | Performed by: INTERNAL MEDICINE

## 2024-08-14 PROCEDURE — 80053 COMPREHEN METABOLIC PANEL: CPT | Performed by: INTERNAL MEDICINE

## 2024-08-14 PROCEDURE — 36415 COLL VENOUS BLD VENIPUNCTURE: CPT | Performed by: INTERNAL MEDICINE

## 2024-08-14 PROCEDURE — 99999 PR PBB SHADOW E&M-EST. PATIENT-LVL III: CPT | Mod: PBBFAC,,, | Performed by: INTERNAL MEDICINE

## 2024-08-14 PROCEDURE — 86704 HEP B CORE ANTIBODY TOTAL: CPT | Performed by: INTERNAL MEDICINE

## 2024-08-14 RX ORDER — FEZOLINETANT 45 MG/1
45 TABLET, FILM COATED ORAL DAILY
Qty: 30 TABLET | Refills: 2 | Status: SHIPPED | OUTPATIENT
Start: 2024-08-14 | End: 2024-09-13

## 2024-08-14 NOTE — TELEPHONE ENCOUNTER
----- Message from Katelyn Najera MD sent at 8/14/2024  9:20 AM CDT -----  ReSched Bone Density prior to f/u   Cbc,cmp, pharmacogenimics. Hep b core ab TODAY  Cmp monthly x 2  Televisit in 2month     ReSched Bone Density prior to f/u   Cbc,cmp, pharmacogenimics. Hep b core ab TODAY  Cmp monthly x 2  Mammo end of October/first week of nov  Televisit in 2month

## 2024-08-14 NOTE — PROGRESS NOTES
Subjective:           Patient ID: Lou Jc is a 54 y.o. female.    Chief Complaint: Medication Management (Patient d/c anastrazole. 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 with positive margin s/p re-excison on 11/16/18    S/p adjuvant XRT left breast under direction of Dr. Vázquez at [facility] on 1/25/2019     Arimidex 3/2019- present            Prior Hx: Pt is a  54  y.o. female seen today for follow-up  Left breast cancer. She was initially seen at [facility] ([facility] in [place], [place]). She had a BI-RADS 0 bilateral screening mammogram for architectural distortion and subsequent bilateral diagnostic mammogram and left breast ultrasound that were BI-RADS 4 and revealed focal asymmetry at the outer central position left breast She subsequently underwent ultrasound-guided core needle biopsy on 8/17/18 with pathology showing fragmented papilloma with no evidence of atypia or in-situ or invasive carcinoma. She was then seen by General Surgery at [facility] on 8/31/18 to discuss wire localization excisional biopsy of this intraductal papilloma, which ultimately occurred on 9/21/18. Final pathology from the excision biopsy upstaged to a 2.0 cm grade 2 ER+ (moderate) MT+ (moderate) ductal carcinoma in situ with focally positive medial margin with no evidence of invasive carcinoma. She subsequently underwent re-excison on 11/16/18. Final pathology showed LCIS but no residual DCIS. Margins uninvolved with closest margins are medial and inferior - 1mm, superior 6mm. She routinely performs self breast exams. She has not noted a change on breast exam. Patient denies nipple discharge. She reports she has not undergone annual routine screening mammograms. She reports history of previous breast bx in 2015- pathologically benign. She underwent [facility] testing with was negative . She is followed by Rad/OncShe has completed  adjuvant XRT left breast to a dose of 5005cGy under direction of Dr. Vázquez at [facility] on 1/25/2019 . She started on Arimidex 3/2019.     Interval Hx; Reports worsening hot flashes  Remains on AI  Appetite and weight stable  No SOB/CP        [facility], negative [facility] with [facility]      Risk Factors/Breast History Age of Menarche: 10y.o. Age of Menopause: 48. LMP 2016. History of Hormonal Therapy: OCPs x 4 years. No HRT.Nulliparous Family History of Breast Ca: Maternal aunt (age 60s). Maternal aunt (age 70s). Paternal aunt (age 70s).   No Family hx of Ovarian Cancer. Other Family History: Father with pancreatic cancer.         Social History .She is a chronic smoker. She has smoked 1ppd x 25 yrs. She drinks 2 beers/week.          Past Medical History:   Diagnosis Date    Allergy     Breast cancer 2018    Breast cyst     DCIS (ductal carcinoma in situ)     Left    Endometriosis     Papilloma of breast     Reflux          Past Surgical History:   Procedure Laterality Date    BREAST BIOPSY Left     DCIS    BREAST CYST ASPIRATION Left     BREAST LUMPECTOMY Left 2018    2 lumpectomy    diagnostic lap      TONSILLECTOMY         Review of Systems   Constitutional:  Negative for appetite change, fatigue, fever and unexpected weight change.   HENT:  Negative for mouth sores.    Eyes:  Negative for visual disturbance.   Respiratory:  Negative for cough and shortness of breath.    Cardiovascular:  Negative for chest pain.   Gastrointestinal:  Negative for abdominal pain and diarrhea.   Endocrine:        Worsening  hot flashes    Genitourinary:  Negative for frequency.   Musculoskeletal:  Negative for back pain.   Skin:  Negative for rash.   Neurological:  Negative for headaches.   Hematological:  Negative for adenopathy.   Psychiatric/Behavioral:  The patient is nervous/anxious.        Objective:              Vitals:    08/14/24 0844   BP: 111/77   Pulse: 83   Resp: 18   SpO2: 97%   Weight: 60.8 kg (134 lb  "0.6 oz)   Height: 4' 11" (1.499 m)           Physical Exam   Constitutional: She is oriented to person, place, and time. She appears well-developed and well-nourished.   HENT:   Head: Normocephalic.   Mouth/Throat: Oropharynx is clear and moist. No oropharyngeal exudate.   Eyes: Conjunctivae and lids are normal.  No scleral icterus.   Neck: Normal range of motion. Neck supple. No thyromegaly present.   Cardiovascular: Normal rate, regular rhythm and normal heart sounds.    No murmur heard.  Pulmonary/Chest: Breath sounds normal. She has no wheezes. She has no rales.   Abdominal: Soft. Bowel sounds are normal. She exhibits no distension and no mass. There is no hepatosplenomegaly. There is no tenderness. There is no rebound and no guarding.   Musculoskeletal: Normal range of motion. She exhibits no edema and no tenderness.   Right breast exhibits tenderness. Right breast exhibits no mass, no nipple discharge and no skin change. Left breast exhibits tenderness. Left breast exhibits no mass, no nipple discharge and no skin change.   Lymphadenopathy:     She has no cervical adenopathy.     She has no axillary adenopathy.        Right: No supraclavicular adenopathy present.        Left: No supraclavicular adenopathy present.   Neurological: She is alert and oriented to person, place, and time. No cranial nerve deficit. Coordination normal.   Skin: Skin is warm and dry. No ecchymosis, no petechiae and no rash noted. No erythema.   Psychiatric: She has a normal mood and affect.           Lab Results   Component Value Date    WBC 8.34 02/29/2024    HGB 13.2 02/29/2024    HCT 39.5 02/29/2024    MCV 91 02/29/2024     02/29/2024     Results for STEFFANIE JIMENEZ (MRN [id number]) as of 1/10/2019 09:20   Ref. Range 12/6/2018 10:27   Estradiol Latest Ref Range: See Text pg/mL <10 (A)   Testosterone, Free Latest Units: pg/mL 1.8     FINAL PATHOLOGIC DIAGNOSIS 9/21/2018   Left breast, wire localized lumpectomy:  -Ductal " carcinoma in situ (DCIS), Grade 2 (intermediate), focally involving the medial margin, present in 5  blocks with an estimated size of at least 20 mm (see cancer case summary below)  -Background breast tissue with fibrocystic change including dense fibrosis, papillomatosis, adenosis, cystic  dilatation, apocrine metaplasia, and fibroadenomatoid nodules  -Biopsy site changes  -No evidence of invasive carcinoma  Surgical pathology cancer case summary:  -Procedure: Excision (less than total mastectomy)  -Specimen laterality: Left  -Size (extent) of DCIS: Estimated size of DCIS: At least 20 mm: Number blocks with DCIS: 5  -Histologic type: Ductal carcinoma in situ  -Architectural patterns: Solid  -Nuclear grade: Grade 2 (intermediate)  -Necrosis: Not identified  -Margins: Medial margin focally positive for DCIS  -Regional lymph nodes: No lymph nodes submitted or found  -Pathologic stage classification (pTNM):  Primary tumor (pT): pTis (DCIS): Ductal carcinoma in situ  Regional lymph nodes (pN): pNX: Regional lymph nodes cannot be assessed  -Microcalcifications: Present in DCIS and nonneoplastic tissue     Hormone receptor results (performed with appropriate controls):  ER - Positive (moderate)  SD - Positive (moderate)           FINAL PATHOLOGIC DIAGNOSIS  11/16/2018    1. LEFT BREAST, RE-EXCISION:  No residual ductal carcinoma in-situ  Lobular carcinoma in-situ  Margins negative for neoplasia or malignancy (closest margins are medial and inferior - 1mm, superior 6mm)  Atypical lobular hyperplasia  Intraductal papilloma  Negative for malignancy  Extensive previous procedure related changes present  (specimen has been submitted in it's entirety)  2. NEW ANTERIOR MARGIN, EXCISION:  Benign skin and subcutaneous tissue with extensive inflammation and procedure related changes  Negative for neoplasia or malignancy  (specimen has been submitted in its entirety)  Part1.  Immunostain E-Cadherin has been performed (with  appropriate controls) on two sections #1C AND 1G and is  negative, supporting the above diagnosis.  Hormone receptors performed for LCIS (Part 1)  ER - Positive (90% of the tumor nuclei, moderate to strong)  MN - Positive (30% of the tumor nuclei, weak)  HER2 - Indeterminate (2+), specimen will be sent out for FISH analysis  Ki67 - 5%, positive tumor nuclei  [id number],PGR        Bone Density  1/18/2018   Osteopenia.  Ten year probability of major osteoporotic fracture is 3.9%, and hip fracture is 0.2%.      mammo screening 6/15/21   Impression:  Right  Architectural Distortion: Right breast architectural distortion at the upper position. Assessment: 0 - Incomplete.      Left  There is no mammographic evidence of malignancy in the left breast.     BI-RADS Category:   Overall: 0 - Incomplete: Needs Additional Imaging Evaluation     Recommendation:  Diagnostic mammogram with possible ultrasound (if indicated) is recommended.          Mammo 11/14/23 BI-RADS Category 1: Negative   Recommendation:  Annual screening mammography, next due October 2024.    Annual screening bilateral breast MRI with IV contrast, next due April 2024.      MRI breast w w/out con 4/30/24 BI-RADS Category:  Overall: 2 - Benign          Latest Reference Range & Units 08/29/23 10:29 12/07/23 09:39   Vitamin D 30.0 - 100.0 ng/mL 40 35.0           Assessment:       1. Ductal carcinoma in situ (DCIS) of left breast    2. Long term (current) use of aromatase inhibitors    3. Hot flashes related to aromatase inhibitor therapy    4. Osteoporosis without current pathological fracture, unspecified osteoporosis type          Plan:       1.,2.  Patient is a 53 y.o. female with Stage 0 pTis N0 M0 grade 2 ER + MN + DCIS of the left breast status post left partial mastectomy with focally positive medial margin s/p re-excison on 11/16/18. Final pathology showed LCIS but no residual DCIS with negative with closest margins are medial and inferior - 1mm   S/p   adjuvant XRT  under the direction of Rad/Onc at [facility] completed 1/25/2019   [facility], negative Integrated [facility] with [facility]   Cont with Arimidex  Mammo 11/14/23 BI-RADS Category 1: Negative   MRI breast w w/out con 4/30/24 BI-RADS Category:  Overall: 2 - Benign  Plan Annual screening mammography, next due October 2024.  She will continue with monthly self-breast exams  Clinically no evidence of disease recurrence    3 Rx for fezolinetant 45 mg po qd  Cmp today and monthly x 2   Televisit 2 mos -assess tolerability    4. . Bone density 2022 recent reveals osteoporosis  Cont Ca and Vit D   Fosamax Rx refill provided  Continue weight bearing exercises  Pt former smoker  Pt did not undergo Bone density\      ReSched Bone Density prior to f/u   Cbc,cmp, pharmacogenimics. Hep b core ab TODAY  Cmp monthly x 2  Mammo end of October/first week of nov  Televisit in 2month       .    Advance Care Planning     Power of   I previously  initiated the process of advance care planning today and explained the importance of this process to the patient.  I introduced the concept of advance directives to the patient, as well. Then the patient received detailed information about the importance of designating a Health Care Power of  (HCPOA). She was also instructed to communicate with this person about their wishes for future healthcare, should she become sick and lose decision-making capacity. The patient has not previously appointed a HCPOA. After our discussion, the patient has decided to complete a HCPOA and has appointed her brother  Xiang Trinidad [phone] and NAME:  Leon Lay [phone]  I spent a total time of 16  minutes discussing this issue with the patient.    Advance Care Planning     Living Will  During previous visit, I engaged the patient in the advance care planning process.  The patient and I reviewed the role for advance directives and their purpose in directing  future healthcare if the patient's unable to speak for him/herself.  At this point in time, the patient does have full decision-making capacity.  We discussed different extreme health states that she could experience, and reviewed what kind of medical care she would want in those situations.  The patient communicated that if she were comatose and had little chance of a meaningful recovery, she would not want machines/life-sustaining treatments used.    The patient has completed a living will to reflect these preferences.  The patient  has already designated a healthcare power of  to make decisions on her behalf.   I spent a total of  16  minutes engaging the patient in this advance care planning discussion.           Cc: MD Maryjo Pagan MD

## 2024-08-14 NOTE — Clinical Note
ReSched Bone Density prior to f/u  Cbc,cmp, pharmacogenimics. Hep b core ab TODAY Cmp monthly x 2 Televisit in 2month   ReSched Bone Density prior to f/u  Cbc,cmp, pharmacogenimics. Hep b core ab TODAY Cmp monthly x 2 Mammo end of October/first week of nov Televisit in 2month

## 2024-08-14 NOTE — TELEPHONE ENCOUNTER
I left a message for this patient in reference of adding another set of labs prior to office visit with Dr. Najrea. Informed that her last set of labs would be scheduled after mammogram at the main campus. Informed to return call to the office.

## 2024-08-22 ENCOUNTER — HOSPITAL ENCOUNTER (OUTPATIENT)
Dept: RADIOLOGY | Facility: CLINIC | Age: 54
Discharge: HOME | End: 2024-08-22
Attending: INTERNAL MEDICINE
Payer: MEDICAID

## 2024-08-22 DIAGNOSIS — M81.0 OSTEOPOROSIS WITHOUT CURRENT PATHOLOGICAL FRACTURE, UNSPECIFIED OSTEOPOROSIS TYPE: ICD-10-CM

## 2024-08-22 PROCEDURE — 77080 DXA BONE DENSITY AXIAL: CPT | Mod: TC,PO

## 2024-08-23 LAB
ONEOME COMMENT: NORMAL
ONEOME METHOD: NORMAL

## 2024-09-04 ENCOUNTER — TELEPHONE (OUTPATIENT)
Dept: PSYCHIATRY | Facility: CLINIC | Age: 54
End: 2024-09-04
Payer: MEDICAID

## 2024-09-04 ENCOUNTER — LAB VISIT (OUTPATIENT)
Dept: LAB | Facility: HOSPITAL | Age: 54
End: 2024-09-04
Attending: INTERNAL MEDICINE
Payer: MEDICAID

## 2024-09-04 DIAGNOSIS — M81.0 OSTEOPOROSIS WITHOUT CURRENT PATHOLOGICAL FRACTURE, UNSPECIFIED OSTEOPOROSIS TYPE: ICD-10-CM

## 2024-09-04 DIAGNOSIS — D05.12 DUCTAL CARCINOMA IN SITU (DCIS) OF LEFT BREAST: ICD-10-CM

## 2024-09-04 LAB
25(OH)D3+25(OH)D2 SERPL-MCNC: 32 NG/ML (ref 30–96)
ALBUMIN SERPL BCP-MCNC: 4.1 G/DL (ref 3.5–5.2)
ALP SERPL-CCNC: 110 U/L (ref 55–135)
ALT SERPL W/O P-5'-P-CCNC: 31 U/L (ref 10–44)
ANION GAP SERPL CALC-SCNC: 11 MMOL/L (ref 8–16)
AST SERPL-CCNC: 20 U/L (ref 10–40)
BASOPHILS # BLD AUTO: 0.03 K/UL (ref 0–0.2)
BASOPHILS NFR BLD: 0.4 % (ref 0–1.9)
BILIRUB SERPL-MCNC: 0.3 MG/DL (ref 0.1–1)
BUN SERPL-MCNC: 10 MG/DL (ref 6–20)
CALCIUM SERPL-MCNC: 9.6 MG/DL (ref 8.7–10.5)
CHLORIDE SERPL-SCNC: 106 MMOL/L (ref 95–110)
CO2 SERPL-SCNC: 22 MMOL/L (ref 23–29)
CREAT SERPL-MCNC: 0.8 MG/DL (ref 0.5–1.4)
DIFFERENTIAL METHOD BLD: NORMAL
EOSINOPHIL # BLD AUTO: 0.1 K/UL (ref 0–0.5)
EOSINOPHIL NFR BLD: 1.6 % (ref 0–8)
ERYTHROCYTE [DISTWIDTH] IN BLOOD BY AUTOMATED COUNT: 13.1 % (ref 11.5–14.5)
EST. GFR  (NO RACE VARIABLE): >60 ML/MIN/1.73 M^2
GLUCOSE SERPL-MCNC: 124 MG/DL (ref 70–110)
HCT VFR BLD AUTO: 43.1 % (ref 37–48.5)
HGB BLD-MCNC: 13.8 G/DL (ref 12–16)
IMM GRANULOCYTES # BLD AUTO: 0.01 K/UL (ref 0–0.04)
IMM GRANULOCYTES NFR BLD AUTO: 0.1 % (ref 0–0.5)
LYMPHOCYTES # BLD AUTO: 2.2 K/UL (ref 1–4.8)
LYMPHOCYTES NFR BLD: 27.3 % (ref 18–48)
MCH RBC QN AUTO: 30.2 PG (ref 27–31)
MCHC RBC AUTO-ENTMCNC: 32 G/DL (ref 32–36)
MCV RBC AUTO: 94 FL (ref 82–98)
MONOCYTES # BLD AUTO: 0.6 K/UL (ref 0.3–1)
MONOCYTES NFR BLD: 7.3 % (ref 4–15)
NEUTROPHILS # BLD AUTO: 5.2 K/UL (ref 1.8–7.7)
NEUTROPHILS NFR BLD: 63.3 % (ref 38–73)
NRBC BLD-RTO: 0 /100 WBC
PLATELET # BLD AUTO: 207 K/UL (ref 150–450)
PMV BLD AUTO: 11.4 FL (ref 9.2–12.9)
POTASSIUM SERPL-SCNC: 3.5 MMOL/L (ref 3.5–5.1)
PROT SERPL-MCNC: 6.7 G/DL (ref 6–8.4)
RBC # BLD AUTO: 4.57 M/UL (ref 4–5.4)
SODIUM SERPL-SCNC: 139 MMOL/L (ref 136–145)
WBC # BLD AUTO: 8.21 K/UL (ref 3.9–12.7)

## 2024-09-04 PROCEDURE — 80053 COMPREHEN METABOLIC PANEL: CPT | Performed by: INTERNAL MEDICINE

## 2024-09-04 PROCEDURE — 82306 VITAMIN D 25 HYDROXY: CPT | Performed by: INTERNAL MEDICINE

## 2024-09-04 PROCEDURE — 85025 COMPLETE CBC W/AUTO DIFF WBC: CPT | Performed by: INTERNAL MEDICINE

## 2024-09-04 PROCEDURE — 36415 COLL VENOUS BLD VENIPUNCTURE: CPT | Performed by: INTERNAL MEDICINE

## 2024-09-06 ENCOUNTER — TELEPHONE (OUTPATIENT)
Dept: PSYCHIATRY | Facility: CLINIC | Age: 54
End: 2024-09-06
Payer: MEDICAID

## 2024-09-16 ENCOUNTER — TELEPHONE (OUTPATIENT)
Dept: PSYCHIATRY | Facility: CLINIC | Age: 54
End: 2024-09-16
Payer: MEDICAID

## 2024-10-04 ENCOUNTER — LAB VISIT (OUTPATIENT)
Dept: LAB | Facility: HOSPITAL | Age: 54
End: 2024-10-04
Attending: INTERNAL MEDICINE
Payer: MEDICAID

## 2024-10-04 DIAGNOSIS — D05.12 DUCTAL CARCINOMA IN SITU (DCIS) OF LEFT BREAST: ICD-10-CM

## 2024-10-04 LAB
ALBUMIN SERPL BCP-MCNC: 4.2 G/DL (ref 3.5–5.2)
ALP SERPL-CCNC: 136 U/L (ref 55–135)
ALT SERPL W/O P-5'-P-CCNC: 37 U/L (ref 10–44)
ANION GAP SERPL CALC-SCNC: 10 MMOL/L (ref 8–16)
AST SERPL-CCNC: 25 U/L (ref 10–40)
BASOPHILS # BLD AUTO: 0.04 K/UL (ref 0–0.2)
BASOPHILS NFR BLD: 0.5 % (ref 0–1.9)
BILIRUB SERPL-MCNC: 0.3 MG/DL (ref 0.1–1)
BUN SERPL-MCNC: 10 MG/DL (ref 6–20)
CALCIUM SERPL-MCNC: 9.6 MG/DL (ref 8.7–10.5)
CHLORIDE SERPL-SCNC: 109 MMOL/L (ref 95–110)
CO2 SERPL-SCNC: 21 MMOL/L (ref 23–29)
CREAT SERPL-MCNC: 0.8 MG/DL (ref 0.5–1.4)
DIFFERENTIAL METHOD BLD: NORMAL
EOSINOPHIL # BLD AUTO: 0.1 K/UL (ref 0–0.5)
EOSINOPHIL NFR BLD: 1.8 % (ref 0–8)
ERYTHROCYTE [DISTWIDTH] IN BLOOD BY AUTOMATED COUNT: 13.1 % (ref 11.5–14.5)
EST. GFR  (NO RACE VARIABLE): >60 ML/MIN/1.73 M^2
GLUCOSE SERPL-MCNC: 114 MG/DL (ref 70–110)
HCT VFR BLD AUTO: 45.1 % (ref 37–48.5)
HGB BLD-MCNC: 14.6 G/DL (ref 12–16)
IMM GRANULOCYTES # BLD AUTO: 0.03 K/UL (ref 0–0.04)
IMM GRANULOCYTES NFR BLD AUTO: 0.4 % (ref 0–0.5)
LYMPHOCYTES # BLD AUTO: 2.2 K/UL (ref 1–4.8)
LYMPHOCYTES NFR BLD: 28 % (ref 18–48)
MCH RBC QN AUTO: 30 PG (ref 27–31)
MCHC RBC AUTO-ENTMCNC: 32.4 G/DL (ref 32–36)
MCV RBC AUTO: 93 FL (ref 82–98)
MONOCYTES # BLD AUTO: 0.5 K/UL (ref 0.3–1)
MONOCYTES NFR BLD: 6.5 % (ref 4–15)
NEUTROPHILS # BLD AUTO: 5 K/UL (ref 1.8–7.7)
NEUTROPHILS NFR BLD: 62.8 % (ref 38–73)
NRBC BLD-RTO: 0 /100 WBC
PLATELET # BLD AUTO: 251 K/UL (ref 150–450)
PMV BLD AUTO: 10.9 FL (ref 9.2–12.9)
POTASSIUM SERPL-SCNC: 3.7 MMOL/L (ref 3.5–5.1)
PROT SERPL-MCNC: 7.4 G/DL (ref 6–8.4)
RBC # BLD AUTO: 4.87 M/UL (ref 4–5.4)
SODIUM SERPL-SCNC: 140 MMOL/L (ref 136–145)
WBC # BLD AUTO: 7.9 K/UL (ref 3.9–12.7)

## 2024-10-04 PROCEDURE — 36415 COLL VENOUS BLD VENIPUNCTURE: CPT | Performed by: INTERNAL MEDICINE

## 2024-10-04 PROCEDURE — 80053 COMPREHEN METABOLIC PANEL: CPT | Performed by: INTERNAL MEDICINE

## 2024-10-04 PROCEDURE — 85025 COMPLETE CBC W/AUTO DIFF WBC: CPT | Performed by: INTERNAL MEDICINE

## 2024-10-07 ENCOUNTER — TELEPHONE (OUTPATIENT)
Dept: PSYCHIATRY | Facility: CLINIC | Age: 54
End: 2024-10-07
Payer: MEDICAID

## 2024-11-05 ENCOUNTER — HOSPITAL ENCOUNTER (OUTPATIENT)
Dept: RADIOLOGY | Facility: HOSPITAL | Age: 54
Discharge: HOME | End: 2024-11-05
Attending: INTERNAL MEDICINE
Payer: MEDICAID

## 2024-11-05 DIAGNOSIS — D05.12 DUCTAL CARCINOMA IN SITU (DCIS) OF LEFT BREAST: ICD-10-CM

## 2024-11-05 PROCEDURE — 77067 SCR MAMMO BI INCL CAD: CPT | Mod: 26,,, | Performed by: RADIOLOGY

## 2024-11-05 PROCEDURE — 77063 BREAST TOMOSYNTHESIS BI: CPT | Mod: 26,,, | Performed by: RADIOLOGY

## 2024-11-05 PROCEDURE — 77063 BREAST TOMOSYNTHESIS BI: CPT | Mod: TC

## 2024-11-11 ENCOUNTER — OFFICE VISIT (OUTPATIENT)
Dept: HEMATOLOGY/ONCOLOGY | Facility: CLINIC | Age: 54
End: 2024-11-11
Payer: MEDICAID

## 2024-11-11 VITALS
WEIGHT: 130.06 LBS | HEART RATE: 91 BPM | DIASTOLIC BLOOD PRESSURE: 90 MMHG | SYSTOLIC BLOOD PRESSURE: 141 MMHG | BODY MASS INDEX: 26.22 KG/M2 | OXYGEN SATURATION: 96 % | HEIGHT: 59 IN

## 2024-11-11 DIAGNOSIS — M81.0 OSTEOPOROSIS WITHOUT CURRENT PATHOLOGICAL FRACTURE, UNSPECIFIED OSTEOPOROSIS TYPE: ICD-10-CM

## 2024-11-11 DIAGNOSIS — D05.12 DUCTAL CARCINOMA IN SITU (DCIS) OF LEFT BREAST: Primary | ICD-10-CM

## 2024-11-11 DIAGNOSIS — N95.1 HOT FLASHES DUE TO MENOPAUSE: ICD-10-CM

## 2024-11-11 PROCEDURE — 3080F DIAST BP >= 90 MM HG: CPT | Mod: CPTII,,, | Performed by: INTERNAL MEDICINE

## 2024-11-11 PROCEDURE — 3008F BODY MASS INDEX DOCD: CPT | Mod: CPTII,,, | Performed by: INTERNAL MEDICINE

## 2024-11-11 PROCEDURE — 3077F SYST BP >= 140 MM HG: CPT | Mod: CPTII,,, | Performed by: INTERNAL MEDICINE

## 2024-11-11 PROCEDURE — G2211 COMPLEX E/M VISIT ADD ON: HCPCS | Mod: S$PBB,,, | Performed by: INTERNAL MEDICINE

## 2024-11-11 PROCEDURE — 99214 OFFICE O/P EST MOD 30 MIN: CPT | Mod: PBBFAC | Performed by: INTERNAL MEDICINE

## 2024-11-11 PROCEDURE — 1159F MED LIST DOCD IN RCRD: CPT | Mod: CPTII,,, | Performed by: INTERNAL MEDICINE

## 2024-11-11 PROCEDURE — 99214 OFFICE O/P EST MOD 30 MIN: CPT | Mod: S$PBB,,, | Performed by: INTERNAL MEDICINE

## 2024-11-11 PROCEDURE — 99999 PR PBB SHADOW E&M-EST. PATIENT-LVL IV: CPT | Mod: PBBFAC,,, | Performed by: INTERNAL MEDICINE

## 2024-11-12 DIAGNOSIS — D05.12 DUCTAL CARCINOMA IN SITU (DCIS) OF LEFT BREAST: ICD-10-CM

## 2024-11-12 RX ORDER — ANASTROZOLE 1 MG/1
1 TABLET ORAL
Qty: 30 TABLET | Refills: 0 | OUTPATIENT
Start: 2024-11-12

## 2024-11-15 DIAGNOSIS — M81.0 OSTEOPOROSIS WITHOUT CURRENT PATHOLOGICAL FRACTURE, UNSPECIFIED OSTEOPOROSIS TYPE: ICD-10-CM

## 2024-11-17 RX ORDER — ALENDRONATE SODIUM 70 MG/1
70 TABLET ORAL
Qty: 4 TABLET | Refills: 0 | Status: SHIPPED | OUTPATIENT
Start: 2024-11-17

## 2024-11-18 ENCOUNTER — TELEPHONE (OUTPATIENT)
Dept: HEMATOLOGY/ONCOLOGY | Facility: CLINIC | Age: 54
End: 2024-11-18
Payer: MEDICAID

## 2024-11-18 NOTE — TELEPHONE ENCOUNTER
Called patient on 11/18/24 in regards to medication, No answer will try again after lunch.  Thank you,  Stepan

## 2024-11-18 NOTE — TELEPHONE ENCOUNTER
----- Message from Katelyn Najera MD sent at 11/17/2024  7:07 PM CST -----  PT WAS PRESCRIBED VEOZ LAST VISIT  FOLLOW UP WITH PT IF SHE HAS STARTED MEDS  IF SHE HAS SCHEDULE CMP IN 1MONTH

## 2024-12-23 ENCOUNTER — HOSPITAL ENCOUNTER (EMERGENCY)
Facility: HOSPITAL | Age: 54
Discharge: HOME | End: 2024-12-23
Attending: STUDENT IN AN ORGANIZED HEALTH CARE EDUCATION/TRAINING PROGRAM
Payer: MEDICAID

## 2024-12-23 VITALS
HEART RATE: 96 BPM | SYSTOLIC BLOOD PRESSURE: 153 MMHG | RESPIRATION RATE: 20 BRPM | DIASTOLIC BLOOD PRESSURE: 78 MMHG | TEMPERATURE: 99 F | WEIGHT: 128 LBS | OXYGEN SATURATION: 100 % | HEIGHT: 59 IN | BODY MASS INDEX: 25.8 KG/M2

## 2024-12-23 DIAGNOSIS — J10.1 INFLUENZA A: Primary | ICD-10-CM

## 2024-12-23 DIAGNOSIS — J30.9 ALLERGIC RHINITIS, UNSPECIFIED SEASONALITY, UNSPECIFIED TRIGGER: ICD-10-CM

## 2024-12-23 LAB
CTP QC/QA: YES
CTP QC/QA: YES
POC MOLECULAR INFLUENZA A AGN: POSITIVE
POC MOLECULAR INFLUENZA B AGN: NEGATIVE
SARS-COV-2 RDRP RESP QL NAA+PROBE: NEGATIVE

## 2024-12-23 PROCEDURE — 99284 EMERGENCY DEPT VISIT MOD MDM: CPT | Mod: 25

## 2024-12-23 PROCEDURE — 25000003 PHARM REV CODE 250: Performed by: NURSE PRACTITIONER

## 2024-12-23 PROCEDURE — 87502 INFLUENZA DNA AMP PROBE: CPT

## 2024-12-23 PROCEDURE — 87635 SARS-COV-2 COVID-19 AMP PRB: CPT | Performed by: STUDENT IN AN ORGANIZED HEALTH CARE EDUCATION/TRAINING PROGRAM

## 2024-12-23 RX ORDER — OSELTAMIVIR PHOSPHATE 75 MG/1
75 CAPSULE ORAL 2 TIMES DAILY
Qty: 10 CAPSULE | Refills: 0 | Status: SHIPPED | OUTPATIENT
Start: 2024-12-23 | End: 2024-12-28

## 2024-12-23 RX ORDER — FLUTICASONE PROPIONATE 50 MCG
1 SPRAY, SUSPENSION (ML) NASAL 2 TIMES DAILY PRN
Qty: 15 G | Refills: 0 | Status: SHIPPED | OUTPATIENT
Start: 2024-12-23 | End: 2025-01-06

## 2024-12-23 RX ORDER — IBUPROFEN 600 MG/1
600 TABLET ORAL EVERY 6 HOURS PRN
Qty: 20 TABLET | Refills: 0 | Status: SHIPPED | OUTPATIENT
Start: 2024-12-23 | End: 2024-12-28

## 2024-12-23 RX ORDER — LORATADINE 10 MG/1
10 TABLET ORAL DAILY
Qty: 30 TABLET | Refills: 0 | Status: SHIPPED | OUTPATIENT
Start: 2024-12-23 | End: 2025-01-22

## 2024-12-23 RX ORDER — BENZONATATE 100 MG/1
100 CAPSULE ORAL 3 TIMES DAILY PRN
Qty: 30 CAPSULE | Refills: 0 | Status: SHIPPED | OUTPATIENT
Start: 2024-12-23 | End: 2025-01-02

## 2024-12-23 RX ORDER — BENZONATATE 100 MG/1
100 CAPSULE ORAL
Status: COMPLETED | OUTPATIENT
Start: 2024-12-23 | End: 2024-12-23

## 2024-12-23 RX ORDER — DEXTROMETHORPHAN HYDROBROMIDE, GUAIFENESIN 5; 100 MG/5ML; MG/5ML
650 LIQUID ORAL EVERY 8 HOURS
Qty: 20 TABLET | Refills: 0 | Status: SHIPPED | OUTPATIENT
Start: 2024-12-23 | End: 2024-12-30

## 2024-12-23 RX ADMIN — BENZONATATE 100 MG: 100 CAPSULE ORAL at 07:12

## 2024-12-23 NOTE — Clinical Note
"Lou "Alfredito Jc was seen and treated in our emergency department on 12/23/2024.  She may return to work on 12/28/2024.       If you have any questions or concerns, please don't hesitate to call.      Mercedes Moore, FNP"

## 2024-12-23 NOTE — DISCHARGE INSTRUCTIONS

## 2024-12-23 NOTE — ED PROVIDER NOTES
Encounter Date: 12/23/2024    SCRIBE #1 NOTE: I, Sharon Acosta, am scribing for, and in the presence of,  Mercedes Moore FNP. I have scribed the following portions of the note - Other sections scribed: HPI, ROS, PE.       History     Chief Complaint   Patient presents with    Generalized Body Aches     Pt reporting generalized body aches and productive cough since yesterday. Pt denies n/v/d.     Cough     54 y.o. female with a PMH of breast cancer, endometriosis, GERD who presents to the Emergency Department with complaints of sore throat since yesterday morning.  She also reports associated symptoms of cough and generalized myalgias.  She denies rash, fever, chest pain, SOB, numbness, weakness, tingling, abdominal pain, back pain, dysuria, hematuria, nausea, vomiting, diarrhea, or any other complaints.   Patient reports her pain as 8/10 and is taking Promethazine DM at nighttime, notes taking Veozah for hot flashes. No other medications today. Reports  smokes. No other alleviating/aggravating factors.  Denies illicit drug use.              The history is provided by the patient. No  was used.     Review of patient's allergies indicates:  No Known Allergies  Past Medical History:   Diagnosis Date    Allergy     Breast cancer 2018    Breast cyst     DCIS (ductal carcinoma in situ)     Left    Endometriosis     Papilloma of breast     Reflux      Past Surgical History:   Procedure Laterality Date    BREAST BIOPSY Left     DCIS    BREAST CYST ASPIRATION Left     BREAST LUMPECTOMY Left 2018    2 lumpectomy    diagnostic lap      TONSILLECTOMY       Family History   Problem Relation Name Age of Onset    Cataracts Maternal Grandmother      Heart attack Maternal Grandfather      Pacemaker/defibrilator Maternal Grandfather      Cataracts Maternal Grandfather      Heart disease Father      Cancer Father          Pancreatic    Diabetes Father      Bone cancer Father      Pacemaker/defibrilator  Father      Diabetes Mother      Heart disease Mother      COPD Mother      Hypertension Mother      Kidney disease Mother      Cancer Mother      Throat cancer Mother      Depression Mother      Hearing loss Mother      Heart failure Mother      Pacemaker/defibrilator Mother      Cataracts Mother      Breast cancer Maternal Aunt Ty     Breast cancer Maternal Aunt      Breast cancer Paternal Aunt Raina     Colon cancer Neg Hx      Ovarian cancer Neg Hx      Uterine cancer Neg Hx      Cervical cancer Neg Hx       Social History     Tobacco Use    Smoking status: Former     Current packs/day: 0.00     Average packs/day: 0.5 packs/day for 20.0 years (10.0 ttl pk-yrs)     Types: Cigarettes     Start date: 1997     Quit date: 2017     Years since quittin.3     Passive exposure: Current    Smokeless tobacco: Never   Substance Use Topics    Alcohol use: Yes     Alcohol/week: 1.0 standard drink of alcohol     Types: 1 Cans of beer per week     Comment: weekly    Drug use: Not Currently     Review of Systems   Constitutional:  Negative for chills and fever.   HENT:  Positive for sore throat. Negative for congestion, ear pain, rhinorrhea and trouble swallowing.    Eyes:  Negative for pain, discharge and redness.   Respiratory:  Positive for cough. Negative for shortness of breath.    Cardiovascular:  Negative for chest pain.   Gastrointestinal:  Negative for abdominal pain, diarrhea, nausea and vomiting.   Genitourinary:  Negative for decreased urine volume and dysuria.   Musculoskeletal:  Positive for myalgias (generalized). Negative for back pain, neck pain and neck stiffness.   Skin:  Negative for rash.   Neurological:  Negative for dizziness, weakness, light-headedness, numbness and headaches.   Psychiatric/Behavioral:  Negative for confusion.        Physical Exam     Initial Vitals [24 0715]   BP Pulse Resp Temp SpO2   (!) 153/78 96 20 98.7 °F (37.1 °C) 100 %      MAP       --         Physical  Exam    Nursing note and vitals reviewed.  Constitutional: She appears well-developed.  Non-toxic appearance. She does not appear ill.   HENT:   Head: Normocephalic and atraumatic.   Right Ear: Tympanic membrane and ear canal normal.   Left Ear: Tympanic membrane and ear canal normal.   Nose: Mucosal edema present. Mouth/Throat: Uvula is midline, oropharynx is clear and moist and mucous membranes are normal. No oropharyngeal exudate, posterior oropharyngeal edema or posterior oropharyngeal erythema.   Post nasal drip.   Eyes: Conjunctivae are normal.   Neck: No Brudzinski's sign and no Kernig's sign noted.   Normal range of motion.  Cardiovascular:  Normal rate and regular rhythm.           Pulmonary/Chest: Effort normal and breath sounds normal. She exhibits no tenderness.   Abdominal: Abdomen is soft. There is no abdominal tenderness.   Musculoskeletal:      Cervical back: Normal range of motion.     Lymphadenopathy:     She has no cervical adenopathy.   Neurological: She is alert and oriented to person, place, and time. Gait normal. GCS eye subscore is 4. GCS verbal subscore is 5. GCS motor subscore is 6.   Skin: Skin is warm, dry and intact. No rash noted.   Psychiatric: She has a normal mood and affect. Her speech is normal and behavior is normal. Judgment and thought content normal.         ED Course   Procedures  Labs Reviewed   POCT INFLUENZA A/B MOLECULAR - Abnormal       Result Value    POC Molecular Influenza A Ag Positive (*)     POC Molecular Influenza B Ag Negative       Acceptable Yes     SARS-COV-2 RDRP GENE    POC Rapid COVID Negative       Acceptable Yes            Imaging Results              X-Ray Chest PA And Lateral (Final result)  Result time 12/23/24 08:05:15      Final result by Lino Ventura MD (12/23/24 08:05:15)                   Impression:      No acute cardiopulmonary finding.      Electronically signed by: Lino Ventura  "MD  Date:    12/23/2024  Time:    08:05               Narrative:    EXAMINATION:  XR CHEST PA AND LATERAL    CLINICAL HISTORY:  Provided history is "cough;  ".    TECHNIQUE:  Frontal and lateral views of the chest were performed.    COMPARISON:  None.    FINDINGS:  Cardiac silhouette is not enlarged. Coarse interstitial lung markings but no large focal area of consolidation.  No sizable pleural effusion.  No pneumothorax.                                       Medications   benzonatate capsule 100 mg (100 mg Oral Given 12/23/24 0741)     Medical Decision Making  This is an urgent evaluation of a 54 y.o. female that presents to the Emergency Department for URI Symptoms for 1 days.  The patient is a non-toxic, afebrile, and well appearing female. On physical exam: Ears: without infection.  Pharynx without infection. Appears well hydrated with moist mucus membranes. Neck soft and supple with no meningeal signs or cervical lymphadenopathy.  Breath sounds are clear and equal bilaterally with no adventitious breath sounds, tachypnea or respiratory distress.  Oxygen saturation is 100% on Room Air, no evidence of hypoxia.     Vital Signs: 153/78, 98.7, 96, 20, 100%   If available, previous records reviewed.   I ordered X-rays and reviewed the radiologist interpretation.  Xray significant for no acute process    Flu: Positive  COVID-19: Negative; COVID Risk Score: 1     The patient is within the antiviral treatment window and has been offered treatment with Tamilfu. Risks/Benefits discussed. Tamilfu information handout will be on the discharge paperwork.     Given the above findings, my overall impression is Flu A, allergic rhinitis. DDX: COVID, OM, OE, strep pharyngitis, meningitis, pneumonia, bacterial sinusitis, or significant dehydration requiring IV fluids or admission    During her stay in the ED, the patient has been given tessalon perles with good relief of her symptoms. The patient will be discharged home with " tessalon perles, claritin, flonase. Additional home care recommendations include Tylenol/Ibuprofen, Hydration. The diagnosis, treatment plan, instructions for follow-up, strict return precautions, and reevaluation with her PCP as well as ED return precautions have been discussed with the patient and she has verbalized an understanding of the information.  All questions or concerns from the patient have been addressed.       Amount and/or Complexity of Data Reviewed  Labs: ordered. Decision-making details documented in ED Course.  Radiology: ordered. Decision-making details documented in ED Course.    Risk  OTC drugs.  Prescription drug management.            Scribe Attestation:   Scribe #1: I performed the above scribed service and the documentation accurately describes the services I performed. I attest to the accuracy of the note.                I, TROY Sanchez, personally performed the services described in this documentation.  All medical record entries made by the scribe were at my direction and in my presence.  I have reviewed the chart and agree that the record reflects my personal performance and is accurate and complete.                 Clinical Impression:  Final diagnoses:  [J10.1] Influenza A (Primary)  [J30.9] Allergic rhinitis, unspecified seasonality, unspecified trigger          ED Disposition Condition    Discharge Stable          ED Prescriptions       Medication Sig Dispense Start Date End Date Auth. Provider    oseltamivir (TAMIFLU) 75 MG capsule Take 1 capsule (75 mg total) by mouth 2 (two) times daily. for 5 days 10 capsule 12/23/2024 12/28/2024 Mercedes Moore FNP    acetaminophen (TYLENOL) 650 MG TbSR Take 1 tablet (650 mg total) by mouth every 8 (eight) hours. for 7 days 20 tablet 12/23/2024 12/30/2024 Mercedes Moore FNP    ibuprofen (ADVIL,MOTRIN) 600 MG tablet Take 1 tablet (600 mg total) by mouth every 6 (six) hours as needed for Pain. 20 tablet 12/23/2024 12/28/2024 Mercedes Moore  NIKKY    benzonatate (TESSALON) 100 MG capsule Take 1 capsule (100 mg total) by mouth 3 (three) times daily as needed for Cough. 30 capsule 12/23/2024 1/2/2025 Mercedes Moore FNP    loratadine (CLARITIN) 10 mg tablet Take 1 tablet (10 mg total) by mouth once daily. 30 tablet 12/23/2024 1/22/2025 Mercedes Moore FNP    fluticasone propionate (FLONASE) 50 mcg/actuation nasal spray 1 spray (50 mcg total) by Each Nostril route 2 (two) times daily as needed for Rhinitis or Allergies. 15 g 12/23/2024 1/6/2025 Mercedes Moore FNP          Follow-up Information       Follow up With Specialties Details Why Contact Info    Parth Wolfe MD Family Medicine Schedule an appointment as soon as possible for a visit in 2 days  [address] [facility] [address] [phone]      [facility] [place] - Emergency Dept Emergency Medicine Go to  If symptoms worsen [address] [facility] [place] [address] [phone]             Mercedes Moore FNP  12/23/24 [id number]

## 2025-01-07 DIAGNOSIS — M81.0 OSTEOPOROSIS WITHOUT CURRENT PATHOLOGICAL FRACTURE, UNSPECIFIED OSTEOPOROSIS TYPE: ICD-10-CM

## 2025-01-07 RX ORDER — ALENDRONATE SODIUM 70 MG/1
70 TABLET ORAL
Qty: 4 TABLET | Refills: 0 | Status: SHIPPED | OUTPATIENT
Start: 2025-01-07

## 2025-02-01 DIAGNOSIS — M81.0 OSTEOPOROSIS WITHOUT CURRENT PATHOLOGICAL FRACTURE, UNSPECIFIED OSTEOPOROSIS TYPE: ICD-10-CM

## 2025-02-03 RX ORDER — ALENDRONATE SODIUM 70 MG/1
70 TABLET ORAL
Qty: 4 TABLET | Refills: 0 | Status: SHIPPED | OUTPATIENT
Start: 2025-02-03

## 2025-03-03 DIAGNOSIS — M81.0 OSTEOPOROSIS WITHOUT CURRENT PATHOLOGICAL FRACTURE, UNSPECIFIED OSTEOPOROSIS TYPE: ICD-10-CM

## 2025-03-03 NOTE — TELEPHONE ENCOUNTER
Sent to Dr. Najera  LOV-11/11/2024  RTC-4/16/2025(5 months scheduled)(CBC, CMP(scheduled 2 days prior)  Last Prescribed-2/3/2025

## 2025-03-07 RX ORDER — ALENDRONATE SODIUM 70 MG/1
70 TABLET ORAL
Qty: 4 TABLET | Refills: 0 | Status: SHIPPED | OUTPATIENT
Start: 2025-03-07

## 2025-04-03 DIAGNOSIS — M81.0 OSTEOPOROSIS WITHOUT CURRENT PATHOLOGICAL FRACTURE, UNSPECIFIED OSTEOPOROSIS TYPE: ICD-10-CM

## 2025-04-03 RX ORDER — ALENDRONATE SODIUM 70 MG/1
70 TABLET ORAL
Qty: 4 TABLET | Refills: 0 | Status: SHIPPED | OUTPATIENT
Start: 2025-04-03

## 2025-04-16 ENCOUNTER — LAB VISIT (OUTPATIENT)
Dept: LAB | Facility: HOSPITAL | Age: 55
End: 2025-04-16
Attending: INTERNAL MEDICINE
Payer: MEDICAID

## 2025-04-16 ENCOUNTER — OFFICE VISIT (OUTPATIENT)
Dept: HEMATOLOGY/ONCOLOGY | Facility: CLINIC | Age: 55
End: 2025-04-16
Payer: MEDICAID

## 2025-04-16 VITALS
HEART RATE: 94 BPM | DIASTOLIC BLOOD PRESSURE: 93 MMHG | SYSTOLIC BLOOD PRESSURE: 162 MMHG | WEIGHT: 140 LBS | HEIGHT: 59 IN | TEMPERATURE: 98 F | BODY MASS INDEX: 28.22 KG/M2

## 2025-04-16 DIAGNOSIS — D05.12 DUCTAL CARCINOMA IN SITU (DCIS) OF LEFT BREAST: ICD-10-CM

## 2025-04-16 DIAGNOSIS — M81.0 OSTEOPOROSIS WITHOUT CURRENT PATHOLOGICAL FRACTURE, UNSPECIFIED OSTEOPOROSIS TYPE: ICD-10-CM

## 2025-04-16 DIAGNOSIS — N95.1 HOT FLASHES DUE TO MENOPAUSE: ICD-10-CM

## 2025-04-16 DIAGNOSIS — D05.12 DUCTAL CARCINOMA IN SITU (DCIS) OF LEFT BREAST: Primary | ICD-10-CM

## 2025-04-16 DIAGNOSIS — Z79.811 LONG TERM (CURRENT) USE OF AROMATASE INHIBITORS: ICD-10-CM

## 2025-04-16 LAB
ABSOLUTE EOSINOPHIL (OHS): 0.07 K/UL
ABSOLUTE MONOCYTE (OHS): 0.37 K/UL (ref 0.3–1)
ABSOLUTE NEUTROPHIL COUNT (OHS): 6.19 K/UL (ref 1.8–7.7)
ALBUMIN SERPL BCP-MCNC: 4.4 G/DL (ref 3.5–5.2)
ALP SERPL-CCNC: 103 UNIT/L (ref 40–150)
ALT SERPL W/O P-5'-P-CCNC: 31 UNIT/L (ref 10–44)
ANION GAP (OHS): 10 MMOL/L (ref 8–16)
AST SERPL-CCNC: 21 UNIT/L (ref 11–45)
BASOPHILS # BLD AUTO: 0.03 K/UL
BASOPHILS NFR BLD AUTO: 0.3 %
BILIRUB SERPL-MCNC: 0.4 MG/DL (ref 0.1–1)
BUN SERPL-MCNC: 11 MG/DL (ref 6–20)
CALCIUM SERPL-MCNC: 9.5 MG/DL (ref 8.7–10.5)
CHLORIDE SERPL-SCNC: 106 MMOL/L (ref 95–110)
CO2 SERPL-SCNC: 24 MMOL/L (ref 23–29)
CREAT SERPL-MCNC: 0.8 MG/DL (ref 0.5–1.4)
ERYTHROCYTE [DISTWIDTH] IN BLOOD BY AUTOMATED COUNT: 13.5 % (ref 11.5–14.5)
GFR SERPLBLD CREATININE-BSD FMLA CKD-EPI: >60 ML/MIN/1.73/M2
GLUCOSE SERPL-MCNC: 162 MG/DL (ref 70–110)
HCT VFR BLD AUTO: 44.8 % (ref 37–48.5)
HGB BLD-MCNC: 14.7 GM/DL (ref 12–16)
IMM GRANULOCYTES # BLD AUTO: 0.03 K/UL (ref 0–0.04)
IMM GRANULOCYTES NFR BLD AUTO: 0.3 % (ref 0–0.5)
LYMPHOCYTES # BLD AUTO: 2.13 K/UL (ref 1–4.8)
MCH RBC QN AUTO: 30.1 PG (ref 27–31)
MCHC RBC AUTO-ENTMCNC: 32.8 G/DL (ref 32–36)
MCV RBC AUTO: 92 FL (ref 82–98)
NUCLEATED RBC (/100WBC) (OHS): 0 /100 WBC
PLATELET # BLD AUTO: 294 K/UL (ref 150–450)
PMV BLD AUTO: 10.7 FL (ref 9.2–12.9)
POTASSIUM SERPL-SCNC: 3.6 MMOL/L (ref 3.5–5.1)
PROT SERPL-MCNC: 7.7 GM/DL (ref 6–8.4)
RBC # BLD AUTO: 4.88 M/UL (ref 4–5.4)
RELATIVE EOSINOPHIL (OHS): 0.8 %
RELATIVE LYMPHOCYTE (OHS): 24.1 % (ref 18–48)
RELATIVE MONOCYTE (OHS): 4.2 % (ref 4–15)
RELATIVE NEUTROPHIL (OHS): 70.3 % (ref 38–73)
SODIUM SERPL-SCNC: 140 MMOL/L (ref 136–145)
WBC # BLD AUTO: 8.82 K/UL (ref 3.9–12.7)

## 2025-04-16 PROCEDURE — 80053 COMPREHEN METABOLIC PANEL: CPT

## 2025-04-16 PROCEDURE — 99215 OFFICE O/P EST HI 40 MIN: CPT | Mod: PBBFAC | Performed by: INTERNAL MEDICINE

## 2025-04-16 PROCEDURE — 85025 COMPLETE CBC W/AUTO DIFF WBC: CPT

## 2025-04-16 PROCEDURE — 3080F DIAST BP >= 90 MM HG: CPT | Mod: CPTII,,, | Performed by: INTERNAL MEDICINE

## 2025-04-16 PROCEDURE — 3077F SYST BP >= 140 MM HG: CPT | Mod: CPTII,,, | Performed by: INTERNAL MEDICINE

## 2025-04-16 PROCEDURE — 99214 OFFICE O/P EST MOD 30 MIN: CPT | Mod: S$PBB,,, | Performed by: INTERNAL MEDICINE

## 2025-04-16 PROCEDURE — 36415 COLL VENOUS BLD VENIPUNCTURE: CPT

## 2025-04-16 PROCEDURE — 3008F BODY MASS INDEX DOCD: CPT | Mod: CPTII,,, | Performed by: INTERNAL MEDICINE

## 2025-04-16 PROCEDURE — 1159F MED LIST DOCD IN RCRD: CPT | Mod: CPTII,,, | Performed by: INTERNAL MEDICINE

## 2025-04-16 PROCEDURE — 99999 PR PBB SHADOW E&M-EST. PATIENT-LVL V: CPT | Mod: PBBFAC,,, | Performed by: INTERNAL MEDICINE

## 2025-05-06 DIAGNOSIS — M81.0 OSTEOPOROSIS WITHOUT CURRENT PATHOLOGICAL FRACTURE, UNSPECIFIED OSTEOPOROSIS TYPE: ICD-10-CM

## 2025-05-06 RX ORDER — ALENDRONATE SODIUM 70 MG/1
70 TABLET ORAL
Qty: 4 TABLET | Refills: 0 | Status: SHIPPED | OUTPATIENT
Start: 2025-05-06

## 2025-05-19 NOTE — PROGRESS NOTES
PATIENT: Lou Jc  MRN: [id number]  DATE: 5/19/2025      Diagnosis:   1. Ductal carcinoma in situ (DCIS) of left breast    2. Long term (current) use of aromatase inhibitors    3. Osteoporosis without current pathological fracture, unspecified osteoporosis type    4. Hot flashes due to menopause        Chief Complaint: No chief complaint on file.      Oncologic History:      Oncologic History     Oncologic Treatment     Pathology           Subjective:    Initial History: Ms. Jc is a 54 y.o. female with DCIS    History of Present Illness    CHIEF COMPLAINT:  Patient presents today for follow-up to discuss cancer treatment completion, bone health, and medication management.    INTERVAL HISTORY:  Patient reports overall well-being. Tramadol was discontinued due to foot pruritus. A new medication was prescribed for back and shoulder pain. Anastrozole treatment for cancer was completed earlier. Veozah, prescribed for hot flashes, was discontinued due to a rash that developed 3-4 days after initiation, which resolved after discontinuation, and hot flash frequency decreased.    Calcium and vitamin D supplementation was recently started. Osteoporosis has been diagnosed. IV treatment for osteoporosis was previously discussed, but she declined due to dental concerns. A partial denture is present, and temporomandibular joint crepitus is reported.    Weakness is noted in her feet and right arm, with a sensation in her feet described as if they want to crack or break, though this is not constant or daily. Occasional pain is noted while ambulating, particularly in the previously fractured ankle. Numbness or tingling in her feet is denied.    LABS:  - Blood count: Normal  - White blood count: Normal  - Platelets: Normal    IMAGING/RADIOLOGY:  - Mammogram: 11/2024    ALLERGIES:  - Veozah: Rash/spots (3-4 days after starting)  - Tramadol: Itchy feet      ROS:  General: -fever, -chills, -fatigue, -weight gain, -weight  loss  Eyes: -vision changes, -redness, -discharge  ENT: -ear pain, -nasal congestion, -sore throat  Cardiovascular: -chest pain, -palpitations, -lower extremity edema  Respiratory: -cough, -shortness of breath  Gastrointestinal: -abdominal pain, -nausea, -vomiting, -diarrhea, -constipation, -blood in stool  Genitourinary: -dysuria, -hematuria, -frequency  Musculoskeletal: -joint pain, -muscle pain, +muscle weakness, +limb pain, +pain with movement  Skin: -rash, -lesion  Neurological: -headache, -dizziness, -numbness, -tingling  Psychiatric: -anxiety, -depression, -sleep difficulty          Past Medical History:   Past Medical History:   Diagnosis Date    Allergy     Breast cancer 2018    Breast cyst     DCIS (ductal carcinoma in situ)     Left    Endometriosis     Papilloma of breast     Reflux        Past Surgical HIstory:   Past Surgical History:   Procedure Laterality Date    BREAST BIOPSY Left     DCIS    BREAST CYST ASPIRATION Left     BREAST LUMPECTOMY Left 2018    2 lumpectomy    diagnostic lap      TONSILLECTOMY         Family History:   Family History   Problem Relation Name Age of Onset    Cataracts Maternal Grandmother      Heart attack Maternal Grandfather      Pacemaker/defibrilator Maternal Grandfather      Cataracts Maternal Grandfather      Heart disease Father      Cancer Father          Pancreatic    Diabetes Father      Bone cancer Father      Pacemaker/defibrilator Father      Diabetes Mother      Heart disease Mother      COPD Mother      Hypertension Mother      Kidney disease Mother      Cancer Mother      Throat cancer Mother      Depression Mother      Hearing loss Mother      Heart failure Mother      Pacemaker/defibrilator Mother      Cataracts Mother      Breast cancer Maternal Aunt Ty     Breast cancer Maternal Aunt      Breast cancer Paternal Aunt Raina     Colon cancer Neg Hx      Ovarian cancer Neg Hx      Uterine cancer Neg Hx      Cervical cancer Neg Hx         Social History:   "reports that she quit smoking about 7 years ago. Her smoking use included cigarettes. She started smoking about 27 years ago. She has a 10 pack-year smoking history. She has been exposed to tobacco smoke. She has never used smokeless tobacco. She reports current alcohol use of about 1.0 standard drink of alcohol per week. She reports that she does not currently use drugs.    Allergies:  Review of patient's allergies indicates:   Allergen Reactions    Tramadol Itching         ECOG Performance Status: 0   Objective:      Vitals:   Vitals:    04/16/25 1538   BP: (!) 162/93   BP Location: Right arm   Patient Position: Sitting   Pulse: 94   Temp: 98.3 °F (36.8 °C)   SpO2: (P) 98%   Weight: 63.5 kg (139 lb 15.9 oz)   Height: 4' 11" (1.499 m)         Physical Exam    General: No acute distress. Well-developed. Well-nourished.  Eyes: EOMI. Sclerae anicteric.  HENT: Normocephalic. Atraumatic. Nares patent. Moist oral mucosa.  Ears: Bilateral TMs clear. Bilateral EACs clear.  Cardiovascular: Regular rate. Regular rhythm. No murmurs. No rubs. No gallops. Normal S1, S2.  Respiratory: Normal respiratory effort. Clear to auscultation bilaterally. No rales. No rhonchi. No wheezing.  Abdomen: Soft. Non-tender. Non-distended. Normoactive bowel sounds.  Musculoskeletal: No  obvious deformity.  Extremities: No lower extremity edema.  Neurological: Alert & oriented x3. No slurred speech. Normal gait.  Psychiatric: Normal mood. Normal affect. Good insight. Good judgment.  Skin: Warm. Dry. No rash.              Laboratory Data:  Lab Visit on 04/16/2025   Component Date Value Ref Range Status    Sodium 04/16/2025 140  136 - 145 mmol/L Final    Potassium 04/16/2025 3.6  3.5 - 5.1 mmol/L Final    Chloride 04/16/2025 106  95 - 110 mmol/L Final    CO2 04/16/2025 24  23 - 29 mmol/L Final    Glucose 04/16/2025 162 (H)  70 - 110 mg/dL Final    BUN 04/16/2025 11  6 - 20 mg/dL Final    Creatinine 04/16/2025 0.8  0.5 - 1.4 mg/dL Final    Calcium " 04/16/2025 9.5  8.7 - 10.5 mg/dL Final    Protein Total 04/16/2025 7.7  6.0 - 8.4 gm/dL Final    Albumin 04/16/2025 4.4  3.5 - 5.2 g/dL Final    Bilirubin Total 04/16/2025 0.4  0.1 - 1.0 mg/dL Final    For infants and newborns, interpretation of results should be based   on gestational age, weight and in agreement with clinical   observations.    Premature Infant recommended reference ranges:   0-24 hours:  <8.0 mg/dL   24-48 hours: <12.0 mg/dL   3-5 days:    <15.0 mg/dL   6-29 days:   <15.0 mg/dL    ALP 04/16/2025 103  40 - 150 unit/L Final    AST 04/16/2025 21  11 - 45 unit/L Final    ALT 04/16/2025 31  10 - 44 unit/L Final    Anion Gap 04/16/2025 10  8 - 16 mmol/L Final    eGFR 04/16/2025 >60  >60 mL/min/1.73/m2 Final    Estimated GFR calculated using the CKD-EPI creatinine (2021) equation.    WBC 04/16/2025 8.82  3.90 - 12.70 K/uL Final    RBC 04/16/2025 4.88  4.00 - 5.40 M/uL Final    HGB 04/16/2025 14.7  12.0 - 16.0 gm/dL Final    HCT 04/16/2025 44.8  37.0 - 48.5 % Final    MCV 04/16/2025 92  82 - 98 fL Final    MCH 04/16/2025 30.1  27.0 - 31.0 pg Final    MCHC 04/16/2025 32.8  32.0 - 36.0 g/dL Final    RDW 04/16/2025 13.5  11.5 - 14.5 % Final    Platelet Count 04/16/2025 294  150 - 450 K/uL Final    MPV 04/16/2025 10.7  9.2 - 12.9 fL Final    Nucleated RBC 04/16/2025 0  <=0 /100 WBC Final    Neut % 04/16/2025 70.3  38 - 73 % Final    Lymph % 04/16/2025 24.1  18 - 48 % Final    Mono % 04/16/2025 4.2  4 - 15 % Final    Eos % 04/16/2025 0.8  <=8 % Final    Basophil % 04/16/2025 0.3  <=1.9 % Final    Imm Grans % 04/16/2025 0.3  0.0 - 0.5 % Final    Neut # 04/16/2025 6.19  1.8 - 7.7 K/uL Final    Lymph # 04/16/2025 2.13  1 - 4.8 K/uL Final    Mono # 04/16/2025 0.37  0.3 - 1 K/uL Final    Eos # 04/16/2025 0.07  <=0.5 K/uL Final    Baso # 04/16/2025 0.03  <=0.2 K/uL Final    Imm Grans # 04/16/2025 0.03  0.00 - 0.04 K/uL Final    Mild elevation in immature granulocytes is non specific and can be seen in a variety of  "conditions including stress response, acute inflammation, trauma and pregnancy. Correlation with other laboratory and clinical findings is essential.         Imaging:     X-Ray Chest PA And Lateral  Narrative: EXAMINATION:  XR CHEST PA AND LATERAL    CLINICAL HISTORY:  Provided history is "cough;  ".    TECHNIQUE:  Frontal and lateral views of the chest were performed.    COMPARISON:  None.    FINDINGS:  Cardiac silhouette is not enlarged. Coarse interstitial lung markings but no large focal area of consolidation.  No sizable pleural effusion.  No pneumothorax.  Impression: No acute cardiopulmonary finding.    Electronically signed by: Lino Ventura MD  Date:    12/23/2024  Time:    08:05       Assessment:       1. Ductal carcinoma in situ (DCIS) of left breast  CBC Auto Differential    Comprehensive Metabolic Panel      2. Long term (current) use of aromatase inhibitors        3. Osteoporosis without current pathological fracture, unspecified osteoporosis type  Vitamin D      4. Hot flashes due to menopause             Assessment & Plan    IMPRESSION:  BREAST CANCER  - Completed aromatase inhibitor (anastrozole) treatment for breast cancer.    HOT FLASHES  - Hot flashes improved without current medication.    OSTEOPOROSIS  - Diagnosed with osteoporosis based on 2024 bone density scan.  - IV bisphosphonate treatment for osteoporosis not recommended due to dental issues and risk of osteonecrosis of the jaw.    MUSCULOSKELETAL PAIN  - Weakness and pain in feet and arm may relate to osteoporosis or possible osteoarthritis rather than neuropathy.    BREAST CANCER HISTORY:  - No specific plan items related to breast cancer history.    OSTEOPOROSIS:  - Started calcium with vitamin D supplement.  - Order vitamin D.    MEDICATION SIDE EFFECTS:  - Discontinued Veozah (fezolinetant) due to skin rash occurring 3-4 days after starting medication.    FOLLOW-UP:  - Follow up in 1 week for vitamin D level check.           "       Katelyn Najera MD  [facility]ology [facility]  [address] [facility] [address] [facility] [address]  [place] [address]  O: [phone] F: [phone]    This note was generated with the assistance of ambient listening technology. Verbal consent was obtained by the patient and accompanying visitor(s) for the recording of patient appointment to facilitate this note. I attest to having reviewed and edited the generated note for accuracy, though some syntax or spelling errors may persist. Please contact the author of this note for any clarification.

## 2025-06-23 DIAGNOSIS — M81.0 OSTEOPOROSIS WITHOUT CURRENT PATHOLOGICAL FRACTURE, UNSPECIFIED OSTEOPOROSIS TYPE: ICD-10-CM

## 2025-06-23 RX ORDER — ALENDRONATE SODIUM 70 MG/1
70 TABLET ORAL
Qty: 4 TABLET | Refills: 0 | Status: SHIPPED | OUTPATIENT
Start: 2025-06-23

## 2025-07-22 DIAGNOSIS — M81.0 OSTEOPOROSIS WITHOUT CURRENT PATHOLOGICAL FRACTURE, UNSPECIFIED OSTEOPOROSIS TYPE: ICD-10-CM

## 2025-07-22 RX ORDER — ALENDRONATE SODIUM 70 MG/1
70 TABLET ORAL
Qty: 4 TABLET | Refills: 0 | Status: SHIPPED | OUTPATIENT
Start: 2025-07-22

## 2025-07-22 RX ORDER — ALENDRONATE SODIUM 70 MG/1
70 TABLET ORAL
Qty: 4 TABLET | Refills: 0 | Status: CANCELLED | OUTPATIENT
Start: 2025-07-22

## 2025-08-07 ENCOUNTER — LAB VISIT (OUTPATIENT)
Dept: LAB | Facility: HOSPITAL | Age: 55
End: 2025-08-07
Attending: INTERNAL MEDICINE
Payer: MEDICAID

## 2025-08-07 DIAGNOSIS — M81.0 OSTEOPOROSIS WITHOUT CURRENT PATHOLOGICAL FRACTURE, UNSPECIFIED OSTEOPOROSIS TYPE: ICD-10-CM

## 2025-08-07 DIAGNOSIS — D05.12 DUCTAL CARCINOMA IN SITU (DCIS) OF LEFT BREAST: ICD-10-CM

## 2025-08-07 LAB
25(OH)D3+25(OH)D2 SERPL-MCNC: 41 NG/ML (ref 30–96)
ABSOLUTE EOSINOPHIL (OHS): 0.16 K/UL
ABSOLUTE MONOCYTE (OHS): 0.35 K/UL (ref 0.3–1)
ABSOLUTE NEUTROPHIL COUNT (OHS): 3.89 K/UL (ref 1.8–7.7)
ALBUMIN SERPL BCP-MCNC: 4.2 G/DL (ref 3.5–5.2)
ALP SERPL-CCNC: 85 UNIT/L (ref 40–150)
ALT SERPL W/O P-5'-P-CCNC: 29 UNIT/L (ref 10–44)
ANION GAP (OHS): 8 MMOL/L (ref 8–16)
AST SERPL-CCNC: 24 UNIT/L (ref 11–45)
BASOPHILS # BLD AUTO: 0.04 K/UL
BASOPHILS NFR BLD AUTO: 0.6 %
BILIRUB SERPL-MCNC: 0.3 MG/DL (ref 0.1–1)
BUN SERPL-MCNC: 11 MG/DL (ref 6–20)
CALCIUM SERPL-MCNC: 9.2 MG/DL (ref 8.7–10.5)
CHLORIDE SERPL-SCNC: 107 MMOL/L (ref 95–110)
CO2 SERPL-SCNC: 27 MMOL/L (ref 23–29)
CREAT SERPL-MCNC: 0.8 MG/DL (ref 0.5–1.4)
ERYTHROCYTE [DISTWIDTH] IN BLOOD BY AUTOMATED COUNT: 13.2 % (ref 11.5–14.5)
GFR SERPLBLD CREATININE-BSD FMLA CKD-EPI: >60 ML/MIN/1.73/M2
GLUCOSE SERPL-MCNC: 116 MG/DL (ref 70–110)
HCT VFR BLD AUTO: 39.2 % (ref 37–48.5)
HGB BLD-MCNC: 12.6 GM/DL (ref 12–16)
IMM GRANULOCYTES # BLD AUTO: 0.02 K/UL (ref 0–0.04)
IMM GRANULOCYTES NFR BLD AUTO: 0.3 % (ref 0–0.5)
LYMPHOCYTES # BLD AUTO: 2.44 K/UL (ref 1–4.8)
MCH RBC QN AUTO: 30.4 PG (ref 27–31)
MCHC RBC AUTO-ENTMCNC: 32.1 G/DL (ref 32–36)
MCV RBC AUTO: 95 FL (ref 82–98)
NUCLEATED RBC (/100WBC) (OHS): 0 /100 WBC
PLATELET # BLD AUTO: 208 K/UL (ref 150–450)
PMV BLD AUTO: 11.5 FL (ref 9.2–12.9)
POTASSIUM SERPL-SCNC: 3.7 MMOL/L (ref 3.5–5.1)
PROT SERPL-MCNC: 6.6 GM/DL (ref 6–8.4)
RBC # BLD AUTO: 4.15 M/UL (ref 4–5.4)
RELATIVE EOSINOPHIL (OHS): 2.3 %
RELATIVE LYMPHOCYTE (OHS): 35.4 % (ref 18–48)
RELATIVE MONOCYTE (OHS): 5.1 % (ref 4–15)
RELATIVE NEUTROPHIL (OHS): 56.3 % (ref 38–73)
SODIUM SERPL-SCNC: 142 MMOL/L (ref 136–145)
WBC # BLD AUTO: 6.9 K/UL (ref 3.9–12.7)

## 2025-08-07 PROCEDURE — 85025 COMPLETE CBC W/AUTO DIFF WBC: CPT

## 2025-08-07 PROCEDURE — 36415 COLL VENOUS BLD VENIPUNCTURE: CPT

## 2025-08-07 PROCEDURE — 82306 VITAMIN D 25 HYDROXY: CPT

## 2025-08-07 PROCEDURE — 84075 ASSAY ALKALINE PHOSPHATASE: CPT

## 2025-08-14 ENCOUNTER — OFFICE VISIT (OUTPATIENT)
Dept: HEMATOLOGY/ONCOLOGY | Facility: CLINIC | Age: 55
End: 2025-08-14
Payer: MEDICAID

## 2025-08-14 VITALS
HEART RATE: 87 BPM | WEIGHT: 138.88 LBS | TEMPERATURE: 98 F | OXYGEN SATURATION: 97 % | BODY MASS INDEX: 28 KG/M2 | DIASTOLIC BLOOD PRESSURE: 77 MMHG | HEIGHT: 59 IN | SYSTOLIC BLOOD PRESSURE: 124 MMHG

## 2025-08-14 DIAGNOSIS — D05.12 DUCTAL CARCINOMA IN SITU (DCIS) OF LEFT BREAST: Primary | ICD-10-CM

## 2025-08-14 DIAGNOSIS — N95.1 HOT FLASHES DUE TO MENOPAUSE: ICD-10-CM

## 2025-08-14 DIAGNOSIS — M81.0 OSTEOPOROSIS WITHOUT CURRENT PATHOLOGICAL FRACTURE, UNSPECIFIED OSTEOPOROSIS TYPE: ICD-10-CM

## 2025-08-14 PROCEDURE — 1159F MED LIST DOCD IN RCRD: CPT | Mod: CPTII,,, | Performed by: INTERNAL MEDICINE

## 2025-08-14 PROCEDURE — 99214 OFFICE O/P EST MOD 30 MIN: CPT | Mod: S$PBB,,, | Performed by: INTERNAL MEDICINE

## 2025-08-14 PROCEDURE — 3008F BODY MASS INDEX DOCD: CPT | Mod: CPTII,,, | Performed by: INTERNAL MEDICINE

## 2025-08-14 PROCEDURE — 4010F ACE/ARB THERAPY RXD/TAKEN: CPT | Mod: CPTII,,, | Performed by: INTERNAL MEDICINE

## 2025-08-14 PROCEDURE — 3074F SYST BP LT 130 MM HG: CPT | Mod: CPTII,,, | Performed by: INTERNAL MEDICINE

## 2025-08-14 PROCEDURE — 3078F DIAST BP <80 MM HG: CPT | Mod: CPTII,,, | Performed by: INTERNAL MEDICINE

## 2025-08-14 PROCEDURE — 99999 PR PBB SHADOW E&M-EST. PATIENT-LVL V: CPT | Mod: PBBFAC,,, | Performed by: INTERNAL MEDICINE

## 2025-08-14 PROCEDURE — 99215 OFFICE O/P EST HI 40 MIN: CPT | Mod: PBBFAC | Performed by: INTERNAL MEDICINE

## 2025-08-20 DIAGNOSIS — M81.0 OSTEOPOROSIS WITHOUT CURRENT PATHOLOGICAL FRACTURE, UNSPECIFIED OSTEOPOROSIS TYPE: ICD-10-CM

## 2025-08-20 RX ORDER — ALENDRONATE SODIUM 70 MG/1
70 TABLET ORAL
Qty: 4 TABLET | Refills: 0 | Status: SHIPPED | OUTPATIENT
Start: 2025-08-20

## (undated) DEVICE — SPONGE LAP 18X18 PREWASHED

## (undated) DEVICE — ADHESIVE DERMABOND ADVANCED

## (undated) DEVICE — ELECTRODE BLADE INSULATED 1 IN

## (undated) DEVICE — COVERS PROBE NR-48 STERILE

## (undated) DEVICE — SUT VICRYL 3-0 27 SH

## (undated) DEVICE — SEE MEDLINE ITEM 146417

## (undated) DEVICE — NDL 18GA X1 1/2 REG BEVEL

## (undated) DEVICE — ELECTRODE REM PLYHSV RETURN 9

## (undated) DEVICE — TRAY MINOR GEN SURG

## (undated) DEVICE — SUT VICRYL PLUS 4-0 P3 18IN

## (undated) DEVICE — COVER PROBE NL STRL 3.6X96IN

## (undated) DEVICE — DRAPE STERI INSTRUMENT 1018

## (undated) DEVICE — SEE MEDLINE ITEM 152572

## (undated) DEVICE — BLADE 4IN EDGE INSULATED

## (undated) DEVICE — GAUZE FLUFF XXLG 36X36 2 PLY

## (undated) DEVICE — SUT SILK 0 STRANDS 30IN BLK

## (undated) DEVICE — GAUZE SPONGE 4X4 12PLY

## (undated) DEVICE — SHEET DRAPE FAN-FOLDED 3/4

## (undated) DEVICE — SEE MEDLINE ITEM 157128

## (undated) DEVICE — PACK UNIVERSAL SPLIT II

## (undated) DEVICE — SOL 0.9% NACL IRRI.IN STERIL